# Patient Record
Sex: FEMALE | Race: WHITE | NOT HISPANIC OR LATINO | Employment: OTHER | URBAN - METROPOLITAN AREA
[De-identification: names, ages, dates, MRNs, and addresses within clinical notes are randomized per-mention and may not be internally consistent; named-entity substitution may affect disease eponyms.]

---

## 2023-02-27 LAB — THYROTROPIN (MIU/L) IN SER/PLAS BY DETECTION LIMIT <= 0.05 MIU/L: 1.79 MIU/L (ref 0.44–3.98)

## 2023-03-10 DIAGNOSIS — G25.81 RLS (RESTLESS LEGS SYNDROME): ICD-10-CM

## 2023-03-10 RX ORDER — ALENDRONATE SODIUM 70 MG/1
70 TABLET ORAL
COMMUNITY
Start: 2023-01-02 | End: 2023-05-31 | Stop reason: ALTCHOICE

## 2023-03-10 RX ORDER — LEVOTHYROXINE SODIUM 25 UG/1
1 TABLET ORAL DAILY
COMMUNITY
Start: 2023-03-03 | End: 2023-05-19 | Stop reason: DRUGHIGH

## 2023-03-10 RX ORDER — ALBUTEROL SULFATE 90 UG/1
2 AEROSOL, METERED RESPIRATORY (INHALATION) EVERY 4 HOURS PRN
COMMUNITY
Start: 2014-01-23 | End: 2023-05-31 | Stop reason: ALTCHOICE

## 2023-03-10 RX ORDER — ROPINIROLE 2 MG/1
2 TABLET, FILM COATED ORAL NIGHTLY
COMMUNITY
End: 2023-03-10 | Stop reason: SDUPTHER

## 2023-03-10 RX ORDER — KETOROLAC TROMETHAMINE 10 MG/1
10 TABLET, FILM COATED ORAL EVERY 6 HOURS PRN
COMMUNITY
Start: 2021-02-17 | End: 2023-05-31 | Stop reason: ALTCHOICE

## 2023-03-10 RX ORDER — LEVOTHYROXINE SODIUM 50 UG/1
1 TABLET ORAL
COMMUNITY
Start: 2020-12-09 | End: 2023-05-19 | Stop reason: DRUGHIGH

## 2023-03-10 RX ORDER — FUROSEMIDE 80 MG/1
1 TABLET ORAL 2 TIMES DAILY
COMMUNITY
Start: 2020-06-03 | End: 2023-06-12 | Stop reason: WASHOUT

## 2023-03-10 RX ORDER — DICLOFENAC SODIUM 50 MG/1
1 TABLET, DELAYED RELEASE ORAL 2 TIMES DAILY PRN
COMMUNITY
Start: 2021-07-16 | End: 2023-05-31 | Stop reason: ALTCHOICE

## 2023-03-10 RX ORDER — ZOLPIDEM TARTRATE 5 MG/1
5 TABLET ORAL NIGHTLY PRN
COMMUNITY
Start: 2023-02-25 | End: 2023-03-28 | Stop reason: SDUPTHER

## 2023-03-10 RX ORDER — ESOMEPRAZOLE MAGNESIUM 40 MG/1
1 CAPSULE, DELAYED RELEASE ORAL DAILY
COMMUNITY
Start: 2020-10-20 | End: 2023-05-31 | Stop reason: ALTCHOICE

## 2023-03-10 RX ORDER — ROPINIROLE 2 MG/1
2 TABLET, FILM COATED ORAL NIGHTLY
Qty: 90 TABLET | Refills: 0 | Status: SHIPPED | OUTPATIENT
Start: 2023-03-10 | End: 2023-04-03 | Stop reason: SDUPTHER

## 2023-03-10 RX ORDER — ERGOCALCIFEROL 1.25 MG/1
1 CAPSULE ORAL
COMMUNITY
End: 2023-05-31 | Stop reason: ALTCHOICE

## 2023-03-28 DIAGNOSIS — G47.09 OTHER INSOMNIA: Primary | ICD-10-CM

## 2023-03-29 RX ORDER — ZOLPIDEM TARTRATE 5 MG/1
5 TABLET ORAL NIGHTLY PRN
Qty: 30 TABLET | Refills: 0 | Status: SHIPPED | OUTPATIENT
Start: 2023-03-29 | End: 2023-04-26 | Stop reason: SDUPTHER

## 2023-03-30 DIAGNOSIS — G47.09 OTHER INSOMNIA: ICD-10-CM

## 2023-03-30 RX ORDER — ZOLPIDEM TARTRATE 5 MG/1
5 TABLET ORAL NIGHTLY PRN
Qty: 30 TABLET | Refills: 0 | OUTPATIENT
Start: 2023-03-30

## 2023-04-03 ENCOUNTER — LAB (OUTPATIENT)
Dept: LAB | Facility: LAB | Age: 68
End: 2023-04-03
Payer: MEDICARE

## 2023-04-03 ENCOUNTER — OFFICE VISIT (OUTPATIENT)
Dept: PRIMARY CARE | Facility: CLINIC | Age: 68
End: 2023-04-03
Payer: MEDICARE

## 2023-04-03 VITALS
HEIGHT: 63 IN | DIASTOLIC BLOOD PRESSURE: 88 MMHG | SYSTOLIC BLOOD PRESSURE: 134 MMHG | BODY MASS INDEX: 30.3 KG/M2 | OXYGEN SATURATION: 99 % | HEART RATE: 128 BPM | WEIGHT: 171 LBS

## 2023-04-03 DIAGNOSIS — R10.84 GENERALIZED ABDOMINAL PAIN: ICD-10-CM

## 2023-04-03 DIAGNOSIS — E66.09 CLASS 1 OBESITY DUE TO EXCESS CALORIES WITH SERIOUS COMORBIDITY AND BODY MASS INDEX (BMI) OF 30.0 TO 30.9 IN ADULT: ICD-10-CM

## 2023-04-03 DIAGNOSIS — R53.83 OTHER FATIGUE: ICD-10-CM

## 2023-04-03 DIAGNOSIS — E55.9 VITAMIN D DEFICIENCY: ICD-10-CM

## 2023-04-03 DIAGNOSIS — E03.9 ACQUIRED HYPOTHYROIDISM: ICD-10-CM

## 2023-04-03 DIAGNOSIS — E04.1 THYROID NODULE: ICD-10-CM

## 2023-04-03 DIAGNOSIS — G47.01 INSOMNIA DUE TO MEDICAL CONDITION: ICD-10-CM

## 2023-04-03 DIAGNOSIS — G25.81 RLS (RESTLESS LEGS SYNDROME): Primary | ICD-10-CM

## 2023-04-03 PROBLEM — R92.8 ABNORMAL MAMMOGRAM: Status: ACTIVE | Noted: 2023-04-03

## 2023-04-03 PROBLEM — M72.0 DUPUYTREN'S CONTRACTURE: Status: ACTIVE | Noted: 2023-04-03

## 2023-04-03 PROBLEM — M54.50 LOWER BACK PAIN: Status: ACTIVE | Noted: 2023-04-03

## 2023-04-03 PROBLEM — R93.89 ABNORMAL CT SCAN: Status: ACTIVE | Noted: 2023-04-03

## 2023-04-03 PROBLEM — M51.369 DISC DEGENERATION, LUMBAR: Status: ACTIVE | Noted: 2023-04-03

## 2023-04-03 PROBLEM — E53.8 VITAMIN B12 DEFICIENCY: Status: ACTIVE | Noted: 2023-04-03

## 2023-04-03 PROBLEM — R21 RASH OF FACE: Status: ACTIVE | Noted: 2023-04-03

## 2023-04-03 PROBLEM — I10 HTN (HYPERTENSION): Status: ACTIVE | Noted: 2023-04-03

## 2023-04-03 PROBLEM — R06.02 SOB (SHORTNESS OF BREATH) ON EXERTION: Status: ACTIVE | Noted: 2023-04-03

## 2023-04-03 PROBLEM — R91.8 LUNG NODULES: Status: ACTIVE | Noted: 2023-04-03

## 2023-04-03 PROBLEM — R41.3 MEMORY PROBLEM: Status: ACTIVE | Noted: 2023-04-03

## 2023-04-03 PROBLEM — M25.531 RIGHT WRIST PAIN: Status: ACTIVE | Noted: 2023-04-03

## 2023-04-03 PROBLEM — R05.9 COUGH: Status: ACTIVE | Noted: 2023-04-03

## 2023-04-03 PROBLEM — G93.32 CHRONIC FATIGUE SYNDROME: Status: ACTIVE | Noted: 2023-04-03

## 2023-04-03 PROBLEM — M25.551 RIGHT HIP PAIN: Status: ACTIVE | Noted: 2023-04-03

## 2023-04-03 PROBLEM — K21.9 ESOPHAGEAL REFLUX: Status: ACTIVE | Noted: 2023-04-03

## 2023-04-03 PROBLEM — R06.89 DIFFICULTY BREATHING: Status: ACTIVE | Noted: 2023-04-03

## 2023-04-03 PROBLEM — M54.16 LUMBAR RADICULOPATHY: Status: ACTIVE | Noted: 2023-04-03

## 2023-04-03 PROBLEM — J03.90 TONSILLITIS: Status: ACTIVE | Noted: 2023-04-03

## 2023-04-03 PROBLEM — F41.9 ANXIETY: Status: ACTIVE | Noted: 2023-04-03

## 2023-04-03 PROBLEM — E78.01 ESSENTIAL FAMILIAL HYPERCHOLESTEROLEMIA: Status: ACTIVE | Noted: 2023-04-03

## 2023-04-03 PROBLEM — G89.4 CHRONIC PAIN SYNDROME: Status: ACTIVE | Noted: 2023-04-03

## 2023-04-03 PROBLEM — J30.9 ALLERGIC RHINITIS: Status: ACTIVE | Noted: 2023-04-03

## 2023-04-03 PROBLEM — R13.12 OROPHARYNGEAL DYSPHAGIA: Status: ACTIVE | Noted: 2023-04-03

## 2023-04-03 PROBLEM — E66.9 OBESITY: Status: ACTIVE | Noted: 2023-04-03

## 2023-04-03 PROBLEM — R53.82 CHRONIC FATIGUE: Status: ACTIVE | Noted: 2023-04-03

## 2023-04-03 PROBLEM — E04.9 ENLARGED THYROID: Status: ACTIVE | Noted: 2023-04-03

## 2023-04-03 PROBLEM — M51.36 DISC DEGENERATION, LUMBAR: Status: ACTIVE | Noted: 2023-04-03

## 2023-04-03 PROBLEM — E78.5 DYSLIPIDEMIA: Status: ACTIVE | Noted: 2023-04-03

## 2023-04-03 PROBLEM — R93.89 ABNORMAL ULTRASOUND OF THYROID GLAND: Status: ACTIVE | Noted: 2023-04-03

## 2023-04-03 PROBLEM — E66.811 CLASS 1 OBESITY DUE TO EXCESS CALORIES WITH SERIOUS COMORBIDITY AND BODY MASS INDEX (BMI) OF 32.0 TO 32.9 IN ADULT: Status: ACTIVE | Noted: 2023-04-03

## 2023-04-03 PROBLEM — R05.3 CHRONIC COUGH: Status: ACTIVE | Noted: 2023-04-03

## 2023-04-03 PROBLEM — R22.1 MASS OF PARAPHARYNGEAL SPACE: Status: ACTIVE | Noted: 2023-04-03

## 2023-04-03 PROBLEM — M96.1 POSTLAMINECTOMY SYNDROME: Status: ACTIVE | Noted: 2023-04-03

## 2023-04-03 PROBLEM — R39.198 DIFFICULTY URINATING: Status: ACTIVE | Noted: 2023-04-03

## 2023-04-03 PROBLEM — M25.511 RIGHT SHOULDER PAIN: Status: ACTIVE | Noted: 2023-04-03

## 2023-04-03 PROBLEM — G47.00 INSOMNIA: Status: ACTIVE | Noted: 2023-04-03

## 2023-04-03 LAB
ALANINE AMINOTRANSFERASE (SGPT) (U/L) IN SER/PLAS: 16 U/L (ref 7–45)
ALBUMIN (G/DL) IN SER/PLAS: 4.9 G/DL (ref 3.4–5)
ALKALINE PHOSPHATASE (U/L) IN SER/PLAS: 81 U/L (ref 33–136)
ANION GAP IN SER/PLAS: 15 MMOL/L (ref 10–20)
APPEARANCE, URINE: NORMAL
ASPARTATE AMINOTRANSFERASE (SGOT) (U/L) IN SER/PLAS: 18 U/L (ref 9–39)
BASOPHILS (10*3/UL) IN BLOOD BY AUTOMATED COUNT: 0.08 X10E9/L (ref 0–0.1)
BASOPHILS/100 LEUKOCYTES IN BLOOD BY AUTOMATED COUNT: 0.6 % (ref 0–2)
BILIRUBIN TOTAL (MG/DL) IN SER/PLAS: 0.4 MG/DL (ref 0–1.2)
BILIRUBIN, URINE: NEGATIVE
BLOOD, URINE: NEGATIVE
CALCIUM (MG/DL) IN SER/PLAS: 10.5 MG/DL (ref 8.6–10.3)
CARBON DIOXIDE, TOTAL (MMOL/L) IN SER/PLAS: 29 MMOL/L (ref 21–32)
CHLORIDE (MMOL/L) IN SER/PLAS: 98 MMOL/L (ref 98–107)
COLOR, URINE: YELLOW
CREATININE (MG/DL) IN SER/PLAS: 0.8 MG/DL (ref 0.5–1.05)
EOSINOPHILS (10*3/UL) IN BLOOD BY AUTOMATED COUNT: 0.28 X10E9/L (ref 0–0.7)
EOSINOPHILS/100 LEUKOCYTES IN BLOOD BY AUTOMATED COUNT: 2.3 % (ref 0–6)
ERYTHROCYTE DISTRIBUTION WIDTH (RATIO) BY AUTOMATED COUNT: 13.8 % (ref 11.5–14.5)
ERYTHROCYTE MEAN CORPUSCULAR HEMOGLOBIN CONCENTRATION (G/DL) BY AUTOMATED: 32.9 G/DL (ref 32–36)
ERYTHROCYTE MEAN CORPUSCULAR VOLUME (FL) BY AUTOMATED COUNT: 87 FL (ref 80–100)
ERYTHROCYTES (10*6/UL) IN BLOOD BY AUTOMATED COUNT: 5.63 X10E12/L (ref 4–5.2)
GFR FEMALE: 80 ML/MIN/1.73M2
GLUCOSE (MG/DL) IN SER/PLAS: 87 MG/DL (ref 74–99)
GLUCOSE, URINE: NEGATIVE MG/DL
HEMATOCRIT (%) IN BLOOD BY AUTOMATED COUNT: 48.9 % (ref 36–46)
HEMOGLOBIN (G/DL) IN BLOOD: 16.1 G/DL (ref 12–16)
IMMATURE GRANULOCYTES/100 LEUKOCYTES IN BLOOD BY AUTOMATED COUNT: 0.5 % (ref 0–0.9)
KETONES, URINE: NEGATIVE MG/DL
LEUKOCYTE ESTERASE, URINE: NEGATIVE
LEUKOCYTES (10*3/UL) IN BLOOD BY AUTOMATED COUNT: 12.4 X10E9/L (ref 4.4–11.3)
LYMPHOCYTES (10*3/UL) IN BLOOD BY AUTOMATED COUNT: 4.03 X10E9/L (ref 1.2–4.8)
LYMPHOCYTES/100 LEUKOCYTES IN BLOOD BY AUTOMATED COUNT: 32.6 % (ref 13–44)
MONOCYTES (10*3/UL) IN BLOOD BY AUTOMATED COUNT: 0.86 X10E9/L (ref 0.1–1)
MONOCYTES/100 LEUKOCYTES IN BLOOD BY AUTOMATED COUNT: 7 % (ref 2–10)
NEUTROPHILS (10*3/UL) IN BLOOD BY AUTOMATED COUNT: 7.05 X10E9/L (ref 1.2–7.7)
NEUTROPHILS/100 LEUKOCYTES IN BLOOD BY AUTOMATED COUNT: 57 % (ref 40–80)
NITRITE, URINE: NEGATIVE
PH, URINE: 6 (ref 5–8)
PLATELETS (10*3/UL) IN BLOOD AUTOMATED COUNT: 289 X10E9/L (ref 150–450)
POTASSIUM (MMOL/L) IN SER/PLAS: 3.7 MMOL/L (ref 3.5–5.3)
PROTEIN TOTAL: 8.1 G/DL (ref 6.4–8.2)
PROTEIN, URINE: NEGATIVE MG/DL
SODIUM (MMOL/L) IN SER/PLAS: 138 MMOL/L (ref 136–145)
SPECIFIC GRAVITY, URINE: 1.02 (ref 1–1.03)
THYROTROPIN (MIU/L) IN SER/PLAS BY DETECTION LIMIT <= 0.05 MIU/L: 2.86 MIU/L (ref 0.44–3.98)
UREA NITROGEN (MG/DL) IN SER/PLAS: 22 MG/DL (ref 6–23)
UROBILINOGEN, URINE: <2 MG/DL (ref 0–1.9)

## 2023-04-03 PROCEDURE — 1036F TOBACCO NON-USER: CPT | Performed by: FAMILY MEDICINE

## 2023-04-03 PROCEDURE — 85025 COMPLETE CBC W/AUTO DIFF WBC: CPT

## 2023-04-03 PROCEDURE — 99214 OFFICE O/P EST MOD 30 MIN: CPT | Performed by: FAMILY MEDICINE

## 2023-04-03 PROCEDURE — 80053 COMPREHEN METABOLIC PANEL: CPT

## 2023-04-03 PROCEDURE — 3008F BODY MASS INDEX DOCD: CPT | Performed by: FAMILY MEDICINE

## 2023-04-03 PROCEDURE — 1159F MED LIST DOCD IN RCRD: CPT | Performed by: FAMILY MEDICINE

## 2023-04-03 PROCEDURE — 3079F DIAST BP 80-89 MM HG: CPT | Performed by: FAMILY MEDICINE

## 2023-04-03 PROCEDURE — 82306 VITAMIN D 25 HYDROXY: CPT

## 2023-04-03 PROCEDURE — 1160F RVW MEDS BY RX/DR IN RCRD: CPT | Performed by: FAMILY MEDICINE

## 2023-04-03 PROCEDURE — 82607 VITAMIN B-12: CPT

## 2023-04-03 PROCEDURE — 3075F SYST BP GE 130 - 139MM HG: CPT | Performed by: FAMILY MEDICINE

## 2023-04-03 PROCEDURE — 36415 COLL VENOUS BLD VENIPUNCTURE: CPT

## 2023-04-03 PROCEDURE — 84443 ASSAY THYROID STIM HORMONE: CPT

## 2023-04-03 PROCEDURE — 81003 URINALYSIS AUTO W/O SCOPE: CPT

## 2023-04-03 RX ORDER — ROPINIROLE 2 MG/1
2 TABLET, FILM COATED ORAL 2 TIMES DAILY
Qty: 180 TABLET | Refills: 0 | Status: SHIPPED | OUTPATIENT
Start: 2023-04-03 | End: 2023-06-27 | Stop reason: SDUPTHER

## 2023-04-03 ASSESSMENT — PAIN SCALES - GENERAL: PAINLEVEL: 0-NO PAIN

## 2023-04-03 NOTE — PROGRESS NOTES
"Subjective     Chantelle Rao is a 67 y.o. female who presents for No chief complaint on file..       HPI  The patient is a 67 year-old female presenting to the clinic for a medication refill.   Follow-up.  Stated that her pulse was elevated and has appointment coming up with cardiologist.  Had been to endocrinologist.  Educated on restless leg syndrome.  Educated on obesity and diet and exercise advised lose weight.  Educated on insomnia and sleep hygiene.  Complaining pain in the abdomen on and off.  Pain comes and goes.  Denies nausea vomiting and diarrhea.  Complain feeling tired but advised on diet exercise.  Educated on vitamin D deficiency we will continue monitor.  Admitted endocrinology for thyroid nodule.  Educated on hypothyroidism.  Dictated as above.    Review of Systems  Review of systems    General.  Denies fever.  Denies chills.    HEENT denies nasal congestion.  Denies sinus pressure.    Respiratory.  Denies cough.  Denies shortness of breath.    Cardiovascular.  Denies chest pain.  Denies heart palpitations.  Denies shortness of breath.    Gastrointestinal.  Dictated as above  Genitourinary denies burning urination.  Denies frequent urination.  Denies flank pain.  Denies blood in the urine.  Denies abnormal vaginal discharge.    Neurology.  Denies tingling numbness but denies weakness.  Denies headache.  Denies blurred vision.    Musculoskeletal.  Denies body aches.  Denies joint pains.  Denies muscle aches.  Denies muscle weakness    Endocrinology.  Denies cold intolerance.  Denies hot intolerance.    Psychiatric.  Denies depression.  Denies anxiety.  Denies suicidal.  Denies homicidal.      Objective       4/3/2023 2:10 PM   /88   BP Location Left arm   Patient Position Sitting   BP Cuff Size Large adult   Pulse 128   SpO2 99 %   Weight 77.6 kg (171 lb)   Height 1.6 m (5' 3\")   Pain Score 0-No pain    Other Vitals   BMI 30.29 kg/m2   BSA 1.86 m2   Menstrual status Postmenopausal   OB/Gyn " status reviewed 4/3/2023   Tobacco   Smoking status Never   Counseling given? No   Smokeless status Never   Reviewed 4/3/2023           Physical Exam  General.  Not in distress.  HEENT normocephalic anicteric sclerae.  Neck soft supple no thyromegaly.  No carotid bruit.  Lungs are clear.  Heart regular.  Abdomen soft slight tenderness noted in all quadrants of the abdomen nondistended bowel sounds are positive.  Extremities no clubbing cyanosis or edema.  Psychiatric.  Has good eye contact.  No crying spells noted.  Speech was normal.  Denies depression.  Denies suicidal.  Denies homicidal.      Assessment/Plan     1.  Restless leg syndrome.  Educated on restless leg syndrome.  Explained adverse effects of medication.    2.        Obesity.  Dictated as above.    3.  Insomnia.  Dictated as above.    4.  Generalized abdominal pain.  We will follow-up on the CT scan.  In the meantime advised patient to call 911 or to go to the emergency room as soon as possible if develops abdominal pain, pain is getting worse, fever, chills, nausea or vomiting.    5.  Fatigue.  Dictated as above.    6.  Vitamin D deficiency.  Educated on vitamin D deficiency.  We will continue monitor.    7.  Thyroid nodule/hypothyroidism.  Dictated as above              Problem List Items Addressed This Visit    None  Scribe Attestation  By signing my name below, IRosaura Scribe   attest that this documentation has been prepared under the direction and in the presence of Yang Fernandez MD.

## 2023-04-04 LAB
CALCIDIOL (25 OH VITAMIN D3) (NG/ML) IN SER/PLAS: 76 NG/ML
COBALAMIN (VITAMIN B12) (PG/ML) IN SER/PLAS: >2000 PG/ML (ref 211–911)

## 2023-04-10 RX ORDER — ERGOCALCIFEROL 1.25 MG/1
1 CAPSULE ORAL
Qty: 12 CAPSULE | Refills: 0 | OUTPATIENT
Start: 2023-04-10

## 2023-04-19 NOTE — PROGRESS NOTES
Subjective     Chantelle Rao is a 67 y.o. female who presents for Follow-up (Follow up meds/results).      HPI  The patient is a 67 year-old female presenting to the clinic for follow up on lab results. I have reviewed results from her most recent blood work with her.  Patient refused all vaccinations.  Patient sees cardiology.  Patient sees Endocrinology for Thyroid.  Order placed for Mammogram 4/20/2023.  Referral placed with Pulmonology 4/20/2023.  Discussed elevated white blood count.  Referral placed with He  Component      Latest Ref Rng 4/3/2023   WBC      4.4 - 11.3 x10E9/L 12.4 (H)    RBC      4.00 - 5.20 x10E12/L 5.63 (H)    HEMOGLOBIN      12.0 - 16.0 g/dL 16.1 (H)    HEMATOCRIT      36.0 - 46.0 % 48.9 (H)    MCV      80 - 100 fL 87    MCHC      32.0 - 36.0 g/dL 32.9    Platelets      150 - 450 x10E9/L 289    RED CELL DISTRIBUTION WIDTH      11.5 - 14.5 % 13.8    Neutrophils %      40.0 - 80.0 % 57.0    Immature Granulocytes %, Automated      0.0 - 0.9 % 0.5    Lymphocytes %      13.0 - 44.0 % 32.6    Monocytes %      2.0 - 10.0 % 7.0    Eosinophils %      0.0 - 6.0 % 2.3    Basophils %      0.0 - 2.0 % 0.6    Neutrophils Absolute      1.20 - 7.70 x10E9/L 7.05    Lymphocytes Absolute      1.20 - 4.80 x10E9/L 4.03    Monocytes Absolute      0.10 - 1.00 x10E9/L 0.86    Eosinophils Absolute      0.00 - 0.70 x10E9/L 0.28    Basophils Absolute      0.00 - 0.10 x10E9/L 0.08    GLUCOSE      74 - 99 mg/dL 87    SODIUM      136 - 145 mmol/L 138    POTASSIUM      3.5 - 5.3 mmol/L 3.7    CHLORIDE      98 - 107 mmol/L 98    Bicarbonate      21 - 32 mmol/L 29    Anion Gap      10 - 20 mmol/L 15    Blood Urea Nitrogen      6 - 23 mg/dL 22    Creatinine      0.50 - 1.05 mg/dL 0.80    GFR Female      >90 mL/min/1.73m2 80    Calcium      8.6 - 10.3 mg/dL 10.5 (H)    Albumin      3.4 - 5.0 g/dL 4.9    Alkaline Phosphatase      33 - 136 U/L 81    Total Protein      6.4 - 8.2 g/dL 8.1    AST      9 - 39 U/L 18    Bilirubin  Total      0.0 - 1.2 mg/dL 0.4    ALT      7 - 45 U/L 16    Color, Urine      STRAW,YELLOW  YELLOW    Appearance, Urine      CLEAR  HAZY    Specific Gravity, Urine      1.005 - 1.035  1.019    pH, Urine      5.0 - 8.0  6.0    Protein, Urine      NEGATIVE mg/dL NEGATIVE    Glucose, Urine      NEGATIVE mg/dL NEGATIVE    Blood, Urine      NEGATIVE  NEGATIVE    Ketones, Urine      NEGATIVE mg/dL NEGATIVE    Bilirubin, Urine      NEGATIVE  NEGATIVE    Urobilinogen, Urine      0.0 - 1.9 mg/dL <2.0    Nitrite, Urine      NEGATIVE  NEGATIVE    Leukocyte Esterase, Urine      NEGATIVE  NEGATIVE    Thyroid Stimulating Hormone      0.44 - 3.98 mIU/L 2.86    Vitamin B12      211 - 911 pg/mL >2000 !    Vitamin D, 25-Hydroxy, Total      ng/mL 76       Legend:  (H) High  ! Abnormalmatology 4/20/2023.     Follow-up.  Reviewed lab results.  Educated on vitamin D deficiency.  Had been to cardiologist.  Had been to endocrinologist.  History of COPD.    Educated on hypothyroidism.  Had been to cardiologist.  Still having trouble breathing.  Recommend and defer to the pulmonology.  Educated on atherosclerosis of her aorta.  Advised to keep appointment with the specialist.  Educated on obesity and diet and exercise.  Advised to lose weight.  Educated on elevated WBC.  Keep appoint with a specialist.  Discussed blood work for screening for condition.  Educated on insomnia and sleep hygiene.  Educated on dyslipidemia and diet and exercise.  Complaining feeling tired but advised on diet exercise.  Has been to endocrinology for ultrasound of thyroid gland.      Review of Systems  Review of systems    General.  Denies fever.  Denies chills.    HEENT denies nasal congestion.  Denies sinus pressure.  .  Respiratory.  Dictated as above..       Cardiovascular.  Denies chest pain.  Denies heart palpitations.  Denies shortness of breath.    Gastrointestinal.  Denies nausea vomiting diarrhea.  Denies abdominal pain.    Genitourinary denies burning  "urination.  Denies frequent urination.  Denies flank pain.  Denies blood in the urine.  Denies abnormal vaginal discharge.    Neurology.  Denies tingling numbness but denies weakness.  Denies headache.  Denies blurred vision.    Musculoskeletal.  Denies body aches.  Denies joint pains.  Denies muscle aches.  Denies muscle weakness    Endocrinology.  Denies cold intolerance.  Denies hot intolerance.    Psychiatric.  Denies depression.  Denies anxiety.  Denies suicidal.  Denies homicidal.    Objective   /79 (BP Location: Left arm, Patient Position: Sitting, BP Cuff Size: Large adult)   Pulse 86   Ht 1.6 m (5' 3\")   Wt 77.6 kg (171 lb)   SpO2 98%   BMI 30.29 kg/m²        Physical Exam  General.  Not in distress.  HEENT normocephalic anicteric sclerae.  Neck soft supple no thyromegaly.  No carotid bruit.  Lungs are clear.  Heart regular.  Abdomen soft nontender nondistended bowel sounds are positive.  Extremities no clubbing cyanosis or edema.  Psychiatric.  Has good eye contact.  No crying spells noted.  Speech was normal.  Denies depression.  Denies suicidal.  Denies homicidal.    Assessment/Plan       1.  Vitamin D deficiency.  Educated on vitamin D deficiency we will continue monitor.    2.  Hypothyroidism.  Educated on hypothyroidism we will continue monitor.    3.  Chronic bronchitis.  Dictated as above  4.  Atherosclerosis of aorta.  Dictated as above.    5.  Obesity.  Dictated as above.    6.  Shortness of breath on exertion.  Dictated as above.    7.  Elevated WBC.  Dictated as above.    8.  Screening for condition.  Dictated as above.    9.  Insomnia.  Educated on sleep hygiene.  Continue current management.    10.  Dyslipidemia.  Dictated as above.    11.  Fatigue.  Dictated as above                                                      Problem List Items Addressed This Visit          Nervous    Insomnia       Respiratory    SOB (shortness of breath) on exertion    Relevant Orders    Referral to " Pulmonology    Simple chronic bronchitis (CMS/HCC)       Circulatory    Atherosclerosis of aorta (CMS/HCC)       Endocrine/Metabolic    Vitamin D deficiency - Primary    Class 1 obesity due to excess calories with serious comorbidity and body mass index (BMI) of 30.0 to 30.9 in adult    Hypothyroidism       Other    Chronic fatigue    Dyslipidemia    Abnormal ultrasound of thyroid gland     Other Visit Diagnoses       Encounter for screening mammogram for malignant neoplasm of breast        Relevant Orders    BI mammo bilateral screening tomosynthesis    Other elevated white blood cell (WBC) count        Relevant Orders    Referral to Hematology    Screening for condition        Relevant Orders    Hepatitis C antibody        Scribe Attestation  By signing my name below, IRosaura Scribe   attest that this documentation has been prepared under the direction and in the presence of Yang Fernandez MD.

## 2023-04-20 ENCOUNTER — OFFICE VISIT (OUTPATIENT)
Dept: PRIMARY CARE | Facility: CLINIC | Age: 68
End: 2023-04-20
Payer: MEDICARE

## 2023-04-20 VITALS
HEIGHT: 63 IN | HEART RATE: 86 BPM | OXYGEN SATURATION: 98 % | DIASTOLIC BLOOD PRESSURE: 79 MMHG | BODY MASS INDEX: 30.3 KG/M2 | WEIGHT: 171 LBS | SYSTOLIC BLOOD PRESSURE: 131 MMHG

## 2023-04-20 DIAGNOSIS — E55.9 VITAMIN D DEFICIENCY: Primary | ICD-10-CM

## 2023-04-20 DIAGNOSIS — D72.828 OTHER ELEVATED WHITE BLOOD CELL (WBC) COUNT: ICD-10-CM

## 2023-04-20 DIAGNOSIS — J41.0 SIMPLE CHRONIC BRONCHITIS (MULTI): ICD-10-CM

## 2023-04-20 DIAGNOSIS — F51.04 PSYCHOPHYSIOLOGICAL INSOMNIA: ICD-10-CM

## 2023-04-20 DIAGNOSIS — E78.5 DYSLIPIDEMIA: ICD-10-CM

## 2023-04-20 DIAGNOSIS — E03.9 ACQUIRED HYPOTHYROIDISM: ICD-10-CM

## 2023-04-20 DIAGNOSIS — R53.82 CHRONIC FATIGUE: ICD-10-CM

## 2023-04-20 DIAGNOSIS — E66.09 CLASS 1 OBESITY DUE TO EXCESS CALORIES WITH SERIOUS COMORBIDITY AND BODY MASS INDEX (BMI) OF 30.0 TO 30.9 IN ADULT: ICD-10-CM

## 2023-04-20 DIAGNOSIS — Z13.9 SCREENING FOR CONDITION: ICD-10-CM

## 2023-04-20 DIAGNOSIS — Z12.31 ENCOUNTER FOR SCREENING MAMMOGRAM FOR MALIGNANT NEOPLASM OF BREAST: ICD-10-CM

## 2023-04-20 DIAGNOSIS — I70.0 ATHEROSCLEROSIS OF AORTA (CMS-HCC): ICD-10-CM

## 2023-04-20 DIAGNOSIS — R93.89 ABNORMAL ULTRASOUND OF THYROID GLAND: ICD-10-CM

## 2023-04-20 DIAGNOSIS — R06.02 SOB (SHORTNESS OF BREATH) ON EXERTION: ICD-10-CM

## 2023-04-20 PROCEDURE — 1036F TOBACCO NON-USER: CPT | Performed by: FAMILY MEDICINE

## 2023-04-20 PROCEDURE — 99214 OFFICE O/P EST MOD 30 MIN: CPT | Performed by: FAMILY MEDICINE

## 2023-04-20 PROCEDURE — 3075F SYST BP GE 130 - 139MM HG: CPT | Performed by: FAMILY MEDICINE

## 2023-04-20 PROCEDURE — 3008F BODY MASS INDEX DOCD: CPT | Performed by: FAMILY MEDICINE

## 2023-04-20 PROCEDURE — 3078F DIAST BP <80 MM HG: CPT | Performed by: FAMILY MEDICINE

## 2023-04-20 PROCEDURE — 1160F RVW MEDS BY RX/DR IN RCRD: CPT | Performed by: FAMILY MEDICINE

## 2023-04-20 PROCEDURE — 1159F MED LIST DOCD IN RCRD: CPT | Performed by: FAMILY MEDICINE

## 2023-04-20 ASSESSMENT — ENCOUNTER SYMPTOMS
OCCASIONAL FEELINGS OF UNSTEADINESS: 0
DEPRESSION: 0
LOSS OF SENSATION IN FEET: 0

## 2023-04-24 DIAGNOSIS — G47.09 OTHER INSOMNIA: ICD-10-CM

## 2023-04-24 RX ORDER — ZOLPIDEM TARTRATE 5 MG/1
5 TABLET ORAL NIGHTLY PRN
Qty: 30 TABLET | Refills: 0 | OUTPATIENT
Start: 2023-04-24

## 2023-04-25 ENCOUNTER — LAB (OUTPATIENT)
Dept: LAB | Facility: LAB | Age: 68
End: 2023-04-25
Payer: MEDICARE

## 2023-04-25 DIAGNOSIS — Z13.9 SCREENING FOR CONDITION: ICD-10-CM

## 2023-04-25 PROCEDURE — 36415 COLL VENOUS BLD VENIPUNCTURE: CPT

## 2023-04-25 PROCEDURE — 86803 HEPATITIS C AB TEST: CPT

## 2023-04-26 ENCOUNTER — TELEPHONE (OUTPATIENT)
Dept: PRIMARY CARE | Facility: CLINIC | Age: 68
End: 2023-04-26
Payer: MEDICARE

## 2023-04-26 DIAGNOSIS — G47.09 OTHER INSOMNIA: ICD-10-CM

## 2023-04-26 LAB — HEPATITIS C VIRUS AB PRESENCE IN SERUM: NONREACTIVE

## 2023-04-26 RX ORDER — ZOLPIDEM TARTRATE 5 MG/1
5 TABLET ORAL NIGHTLY PRN
Qty: 30 TABLET | Refills: 0 | Status: SHIPPED | OUTPATIENT
Start: 2023-04-28 | End: 2023-05-30 | Stop reason: SDUPTHER

## 2023-04-26 NOTE — TELEPHONE ENCOUNTER
----- Message from Patricia Barba LPN sent at 4/26/2023  9:07 AM EDT -----  Regarding: FW: Refill on medication  Contact: [phone]    ----- Message -----  From: Chantelle Rao  Sent: 4/26/2023   8:52 AM EDT  To:  [place] [id number] Clinical Support Staff  Subject: Refill on medication                             Zolpidem 5 mg

## 2023-04-28 ENCOUNTER — APPOINTMENT (OUTPATIENT)
Dept: PRIMARY CARE | Facility: CLINIC | Age: 68
End: 2023-04-28
Payer: MEDICARE

## 2023-05-19 DIAGNOSIS — E03.8 OTHER SPECIFIED HYPOTHYROIDISM: ICD-10-CM

## 2023-05-19 RX ORDER — LEVOTHYROXINE SODIUM 50 UG/1
50 TABLET ORAL
Qty: 90 TABLET | Refills: 0 | Status: SHIPPED | OUTPATIENT
Start: 2023-05-19 | End: 2023-06-20 | Stop reason: SDUPTHER

## 2023-05-30 DIAGNOSIS — G47.09 OTHER INSOMNIA: ICD-10-CM

## 2023-05-30 RX ORDER — ZOLPIDEM TARTRATE 5 MG/1
5 TABLET ORAL NIGHTLY PRN
Qty: 30 TABLET | Refills: 0 | Status: SHIPPED | OUTPATIENT
Start: 2023-06-01 | End: 2023-07-19 | Stop reason: SDUPTHER

## 2023-05-31 ENCOUNTER — OFFICE VISIT (OUTPATIENT)
Dept: PRIMARY CARE | Facility: CLINIC | Age: 68
End: 2023-05-31
Payer: MEDICARE

## 2023-05-31 VITALS
HEIGHT: 63 IN | BODY MASS INDEX: 30.3 KG/M2 | HEART RATE: 114 BPM | DIASTOLIC BLOOD PRESSURE: 84 MMHG | SYSTOLIC BLOOD PRESSURE: 146 MMHG | WEIGHT: 171 LBS | OXYGEN SATURATION: 98 %

## 2023-05-31 DIAGNOSIS — M25.551 RIGHT HIP PAIN: ICD-10-CM

## 2023-05-31 DIAGNOSIS — I35.1 AORTIC VALVE INSUFFICIENCY, ETIOLOGY OF CARDIAC VALVE DISEASE UNSPECIFIED: ICD-10-CM

## 2023-05-31 DIAGNOSIS — R79.89 ABNORMAL CBC: ICD-10-CM

## 2023-05-31 DIAGNOSIS — M54.41 ACUTE BILATERAL LOW BACK PAIN WITH RIGHT-SIDED SCIATICA: ICD-10-CM

## 2023-05-31 DIAGNOSIS — G47.01 INSOMNIA DUE TO MEDICAL CONDITION: ICD-10-CM

## 2023-05-31 DIAGNOSIS — E66.09 CLASS 1 OBESITY DUE TO EXCESS CALORIES WITH SERIOUS COMORBIDITY AND BODY MASS INDEX (BMI) OF 30.0 TO 30.9 IN ADULT: ICD-10-CM

## 2023-05-31 DIAGNOSIS — E55.9 VITAMIN D DEFICIENCY: ICD-10-CM

## 2023-05-31 DIAGNOSIS — F41.9 ANXIETY: ICD-10-CM

## 2023-05-31 DIAGNOSIS — R93.1 ABNORMAL ECHOCARDIOGRAM: Primary | ICD-10-CM

## 2023-05-31 PROCEDURE — 3008F BODY MASS INDEX DOCD: CPT | Performed by: FAMILY MEDICINE

## 2023-05-31 PROCEDURE — 1159F MED LIST DOCD IN RCRD: CPT | Performed by: FAMILY MEDICINE

## 2023-05-31 PROCEDURE — 1160F RVW MEDS BY RX/DR IN RCRD: CPT | Performed by: FAMILY MEDICINE

## 2023-05-31 PROCEDURE — 3077F SYST BP >= 140 MM HG: CPT | Performed by: FAMILY MEDICINE

## 2023-05-31 PROCEDURE — 3079F DIAST BP 80-89 MM HG: CPT | Performed by: FAMILY MEDICINE

## 2023-05-31 PROCEDURE — 99214 OFFICE O/P EST MOD 30 MIN: CPT | Performed by: FAMILY MEDICINE

## 2023-05-31 PROCEDURE — 1036F TOBACCO NON-USER: CPT | Performed by: FAMILY MEDICINE

## 2023-05-31 ASSESSMENT — PAIN SCALES - GENERAL: PAINLEVEL: 4

## 2023-05-31 NOTE — PROGRESS NOTES
Subjective     Chantelle Rao is a 68 y.o. female who presents for Follow-up (Follow up meds).      HPI  The patient is a 68 year-old female presenting to the clinic for follow up on lower back pain. She had a canceled appointment with cardiology.  Referral placed with cardiology 5/31/2023.  Follow-up.  Reviewed echocardiogram results.  Came back abnormal.  Patient had been to the cardiologist in the past.  Would like to go see the cardiologist locally.  Discussed about aortic valve regurgitation.  Referral was placed.  Educated on obesity and diet and exercise.  Complaint pain in the lower back.  Tingling numbness going down the leg all the way down to the right foot.  Denies trauma.  Denies injury.  Denies weakness numbness incontinence.  On pain scale 7-8/10.  Squeezing throbbing pain.  Aggravating factors.  Physical activity walking and relieving factors.  Rest.  Complaining right hip pain.  On pain scale 8-9/10.  Throbbing pain.  History of anxiety but denies depression.  Denies suicidal.  Denies homicidal.  Educated on insomnia and sleep hygiene.        Review of Systems  Review of systems:    General:  Denies fever.  Denies chills.    HEENT: Denies nasal congestion.  Denies sinus pressure.    Respiratory:  Denies cough.  Denies shortness of breath.    Cardiovascular:  Dictated as above.    Gastrointestinal:  Denies nausea vomiting diarrhea.  Denies abdominal pain.    Genitourinary: Denies burning urination.  Denies frequent urination.  Denies flank pain.  Denies blood in the urine.  Denies abnormal vaginal discharge.    Neurology:  Denies tingling numbness but denies weakness.  Denies headache.  Denies blurred vision.    Musculoskeletal: Dictated as above    Endocrinology:  Denies cold intolerance.  Denies hot intolerance.    Psychiatric:  Denies depression.  Denies anxiety.  Denies suicidal.  Denies homicidal.  Neurology.  Dictated as above    Objective   /84 (BP Location: Left arm, Patient Position:  "Sitting, BP Cuff Size: Large adult)   Pulse (!) 114   Ht 1.6 m (5' 3\")   Wt 77.6 kg (171 lb)   SpO2 98%   BMI 30.29 kg/m²        Physical Exam  General.  Not in distress.  HEENT normocephalic anicteric sclerae.  Neck soft supple no thyromegaly.  No carotid bruit.  Lungs are clear.  Heart regular.  Abdomen soft nontender nondistended bowel sounds are positive.  Extremities no clubbing cyanosis or edema.  Psychiatric.  Has good eye contact.  No crying spells noted.  Speech was normal.  Denies depression.  Denies suicidal.  Denies homicidal.  Musculoskeletal tenderness along the lower lumbar paraspinal muscular area.  Tenderness noted on the right hip.  Neurological exam..  Complaining pain in both hips at 45 degree.    Assessment/Plan   1.  Abnormal echocardiogram.  Reviewed echocardiogram results.  Keep appointment with a cardiologist.    2.  Aortic valve insufficiency.  Discussed echocardiogram results.  Keep appointment with a cardiologist.  In the meantime advised patient to call 911 if develops chest pain and/or heart palpitations and/or shortness of breath.  She understood low-grade understanding and agreed.    3.  Obesity.  Educated on and exercise and advised to lose weight.  We will continue monitor      4.  Lower back pain.  Educated on back exercises we will follow-up on the MRI.    5.  Right hip pain.  Educated on hip exercises.  We will follow-up on the MRI.    6.  Anxiety.  Counseled for anxiety and stress.  Denies depression.  Denies suicidal.  Denies homicidal.    7.  Insomnia.  Educated on insomnia and sleep hygiene.  Continue current management    8.  Vitamin D deficiency.  Educated on vitamin D deficiency blood work unremarkable    9.  Abnormal CBC.  Advised to keep appointment with the hematologist.                        Problem List Items Addressed This Visit          Nervous    Insomnia       Musculoskeletal    Right hip pain    Relevant Orders    MR hips right wo IV contrast       " Endocrine/Metabolic    Vitamin D deficiency    Class 1 obesity due to excess calories with serious comorbidity and body mass index (BMI) of 30.0 to 30.9 in adult       Other    Anxiety    Lower back pain    Relevant Orders    MR lumbar spine wo IV contrast     Other Visit Diagnoses       Abnormal echocardiogram    -  Primary    Relevant Orders    Referral to Cardiology    Aortic valve insufficiency, etiology of cardiac valve disease unspecified        Relevant Orders    Referral to Cardiology    Abnormal CBC              Scribe Attestation  By signing my name below, IRosaura Scribe   attest that this documentation has been prepared under the direction and in the presence of Yang Fernandez MD.

## 2023-06-08 ENCOUNTER — APPOINTMENT (OUTPATIENT)
Dept: LAB | Facility: LAB | Age: 68
End: 2023-06-08
Payer: MEDICARE

## 2023-06-08 LAB
ANION GAP IN SER/PLAS: 13 MMOL/L (ref 10–20)
CALCIUM (MG/DL) IN SER/PLAS: 9.2 MG/DL (ref 8.6–10.3)
CARBON DIOXIDE, TOTAL (MMOL/L) IN SER/PLAS: 25 MMOL/L (ref 21–32)
CHLORIDE (MMOL/L) IN SER/PLAS: 106 MMOL/L (ref 98–107)
CREATININE (MG/DL) IN SER/PLAS: 0.74 MG/DL (ref 0.5–1.05)
ERYTHROCYTE DISTRIBUTION WIDTH (RATIO) BY AUTOMATED COUNT: 13.7 % (ref 11.5–14.5)
ERYTHROCYTE MEAN CORPUSCULAR HEMOGLOBIN CONCENTRATION (G/DL) BY AUTOMATED: 31.3 G/DL (ref 32–36)
ERYTHROCYTE MEAN CORPUSCULAR VOLUME (FL) BY AUTOMATED COUNT: 90 FL (ref 80–100)
ERYTHROCYTES (10*6/UL) IN BLOOD BY AUTOMATED COUNT: 5.06 X10E12/L (ref 4–5.2)
GFR FEMALE: 88 ML/MIN/1.73M2
GLUCOSE (MG/DL) IN SER/PLAS: 84 MG/DL (ref 74–99)
HEMATOCRIT (%) IN BLOOD BY AUTOMATED COUNT: 45.3 % (ref 36–46)
HEMOGLOBIN (G/DL) IN BLOOD: 14.2 G/DL (ref 12–16)
LEUKOCYTES (10*3/UL) IN BLOOD BY AUTOMATED COUNT: 6.7 X10E9/L (ref 4.4–11.3)
PLATELETS (10*3/UL) IN BLOOD AUTOMATED COUNT: 207 X10E9/L (ref 150–450)
POTASSIUM (MMOL/L) IN SER/PLAS: 4.2 MMOL/L (ref 3.5–5.3)
SODIUM (MMOL/L) IN SER/PLAS: 140 MMOL/L (ref 136–145)
UREA NITROGEN (MG/DL) IN SER/PLAS: 14 MG/DL (ref 6–23)

## 2023-06-12 ENCOUNTER — OFFICE VISIT (OUTPATIENT)
Dept: PRIMARY CARE | Facility: CLINIC | Age: 68
End: 2023-06-12
Payer: MEDICARE

## 2023-06-12 VITALS — SYSTOLIC BLOOD PRESSURE: 136 MMHG | HEART RATE: 92 BPM | DIASTOLIC BLOOD PRESSURE: 84 MMHG | OXYGEN SATURATION: 98 %

## 2023-06-12 DIAGNOSIS — E55.9 VITAMIN D DEFICIENCY: ICD-10-CM

## 2023-06-12 DIAGNOSIS — M79.89 LEG SWELLING: ICD-10-CM

## 2023-06-12 DIAGNOSIS — F51.04 PSYCHOPHYSIOLOGICAL INSOMNIA: ICD-10-CM

## 2023-06-12 DIAGNOSIS — R93.1 ABNORMAL ECHOCARDIOGRAM: Primary | ICD-10-CM

## 2023-06-12 DIAGNOSIS — I35.1 AORTIC VALVE INSUFFICIENCY, ETIOLOGY OF CARDIAC VALVE DISEASE UNSPECIFIED: ICD-10-CM

## 2023-06-12 DIAGNOSIS — G25.81 RLS (RESTLESS LEGS SYNDROME): ICD-10-CM

## 2023-06-12 DIAGNOSIS — E03.9 ACQUIRED HYPOTHYROIDISM: ICD-10-CM

## 2023-06-12 PROCEDURE — 99213 OFFICE O/P EST LOW 20 MIN: CPT | Performed by: FAMILY MEDICINE

## 2023-06-12 PROCEDURE — 3075F SYST BP GE 130 - 139MM HG: CPT | Performed by: FAMILY MEDICINE

## 2023-06-12 PROCEDURE — 3008F BODY MASS INDEX DOCD: CPT | Performed by: FAMILY MEDICINE

## 2023-06-12 PROCEDURE — 3079F DIAST BP 80-89 MM HG: CPT | Performed by: FAMILY MEDICINE

## 2023-06-12 PROCEDURE — 1036F TOBACCO NON-USER: CPT | Performed by: FAMILY MEDICINE

## 2023-06-12 PROCEDURE — 1160F RVW MEDS BY RX/DR IN RCRD: CPT | Performed by: FAMILY MEDICINE

## 2023-06-12 PROCEDURE — 1159F MED LIST DOCD IN RCRD: CPT | Performed by: FAMILY MEDICINE

## 2023-06-12 RX ORDER — TORSEMIDE 20 MG/1
2 TABLET ORAL 2 TIMES DAILY
COMMUNITY
Start: 2023-06-08

## 2023-06-12 NOTE — PROGRESS NOTES
Subjective     Chantelle Rao is a 68 y.o. female who presents for Procedure.      HPI  The patient is a 68 year-old female presenting to the clinic for follow up     Had been to cardiologist.  Scheduled for echocardiogram and cardiac catheterization on 19th  Educated on hypothyroidism.  Educated on insomnia and sleep hygiene.  History of leg swelling.  Denies PND.  Denies orthopnea.  No history of abnormal echocardiogram discussed aortic valve insufficiency.  Keep appoint with a specialist.  Educated on vitamin D deficiency we will continue monitor.  Educated on restless leg syndrome.        Review of Systems  Review of systems:    General:  Denies fever.  Denies chills.    HEENT: Denies nasal congestion.  Denies sinus pressure.    Respiratory:  Denies cough.  Denies shortness of breath.    Cardiovascular:  Denies chest pain.  Denies heart palpitations.  Denies shortness of breath.    Gastrointestinal:  Denies nausea vomiting diarrhea.  Denies abdominal pain.    Genitourinary: Denies burning urination.  Denies frequent urination.  Denies flank pain.  Denies blood in the urine.  Denies abnormal vaginal discharge.    Neurology:  Denies tingling numbness but denies weakness.  Denies headache.  Denies blurred vision.    Musculoskeletal:  Denies body aches.  Denies joint pains.  Denies muscle aches.  Denies muscle weakness    Endocrinology:  Dictated as above.    Psychiatric:  Denies depression.  Denies anxiety.  Denies suicidal.  Denies homicidal.      Objective   /84 (BP Location: Left arm)   Pulse 92   SpO2 98%          Physical Exam  General.  Not in distress.  HEENT normocephalic anicteric sclerae.  Neck soft supple no thyromegaly.  No carotid bruit.  Lungs are clear.  Heart regular.  Abdomen soft nontender nondistended bowel sounds are positive.  Extremities no clubbing cyanosis or edema.  Psychiatric.  Has good eye contact.  No crying spells noted.  Speech was normal.  Denies depression.  Denies suicidal.   Denies homicidal.      Assessment/Plan     1.  Abnormal echocardiogram.  Dictated as above  2.  Leg swelling.  Denies PND.  Denies orthopnea.  3.  Insomnia.  Educated on insomnia and sleep hygiene.  Continue current management.    4.  Hypothyroidism.  Educated on hypothyroidism.  We will continue monitor.    5.  Aortic valve insufficiency.  Dictated as above.  6.  Vitamin D deficiency.  Educated on vitamin D deficiency we will continue monitor    7.  Restless leg syndrome.  Educated on restless leg syndrome.  Continue current management.                                        Problem List Items Addressed This Visit          Nervous    Insomnia    RLS (restless legs syndrome)       Endocrine/Metabolic    Vitamin D deficiency    Hypothyroidism     Other Visit Diagnoses       Abnormal echocardiogram    -  Primary    Leg swelling        Aortic valve insufficiency, etiology of cardiac valve disease unspecified            Scribe Attestation  By signing my name below, IRosaura Scribe   attest that this documentation has been prepared under the direction and in the presence of Yang Fernandez MD.

## 2023-06-19 ENCOUNTER — HOSPITAL ENCOUNTER (OUTPATIENT)
Dept: DATA CONVERSION | Facility: HOSPITAL | Age: 68
End: 2023-06-19
Attending: INTERNAL MEDICINE | Admitting: INTERNAL MEDICINE
Payer: MEDICARE

## 2023-06-19 DIAGNOSIS — I35.1 NONRHEUMATIC AORTIC (VALVE) INSUFFICIENCY: ICD-10-CM

## 2023-06-19 DIAGNOSIS — E78.5 HYPERLIPIDEMIA, UNSPECIFIED: ICD-10-CM

## 2023-06-19 DIAGNOSIS — R07.9 CHEST PAIN, UNSPECIFIED: ICD-10-CM

## 2023-06-19 DIAGNOSIS — R06.00 DYSPNEA, UNSPECIFIED: ICD-10-CM

## 2023-06-20 DIAGNOSIS — E03.8 OTHER SPECIFIED HYPOTHYROIDISM: ICD-10-CM

## 2023-06-20 RX ORDER — LEVOTHYROXINE SODIUM 50 UG/1
50 TABLET ORAL
Qty: 90 TABLET | Refills: 0 | Status: SHIPPED | OUTPATIENT
Start: 2023-06-20 | End: 2023-06-26 | Stop reason: SDUPTHER

## 2023-06-22 ENCOUNTER — TELEPHONE (OUTPATIENT)
Dept: PRIMARY CARE | Facility: CLINIC | Age: 68
End: 2023-06-22
Payer: MEDICARE

## 2023-06-22 DIAGNOSIS — M25.551 RIGHT HIP PAIN: Primary | ICD-10-CM

## 2023-06-22 NOTE — TELEPHONE ENCOUNTER
Please contact patient and provide results and advise patient to discuss with neurologist MRI results    IMPRESSION:  Postsurgical changes of laminectomy on the right at L5-S1. There is  no significant spinal canal stenosis, mild left and severe right  neural foraminal stenosis at this level.     Degenerative changes of the lumbar spine without significant spinal  canal stenosis

## 2023-06-26 DIAGNOSIS — E03.8 OTHER SPECIFIED HYPOTHYROIDISM: ICD-10-CM

## 2023-06-26 RX ORDER — LEVOTHYROXINE SODIUM 50 UG/1
50 TABLET ORAL
Qty: 90 TABLET | Refills: 0 | Status: SHIPPED | OUTPATIENT
Start: 2023-06-26 | End: 2023-09-20 | Stop reason: SDUPTHER

## 2023-06-27 ENCOUNTER — TELEPHONE (OUTPATIENT)
Dept: PRIMARY CARE | Facility: CLINIC | Age: 68
End: 2023-06-27
Payer: MEDICARE

## 2023-06-27 DIAGNOSIS — G25.81 RLS (RESTLESS LEGS SYNDROME): ICD-10-CM

## 2023-06-27 RX ORDER — ROPINIROLE 2 MG/1
2 TABLET, FILM COATED ORAL 2 TIMES DAILY
Qty: 60 TABLET | Refills: 0 | Status: SHIPPED | OUTPATIENT
Start: 2023-06-27 | End: 2023-09-22 | Stop reason: SDUPTHER

## 2023-07-18 ENCOUNTER — OFFICE VISIT (OUTPATIENT)
Dept: PRIMARY CARE | Facility: CLINIC | Age: 68
End: 2023-07-18
Payer: MEDICARE

## 2023-07-18 VITALS
TEMPERATURE: 97.3 F | BODY MASS INDEX: 30.56 KG/M2 | DIASTOLIC BLOOD PRESSURE: 90 MMHG | HEIGHT: 64 IN | SYSTOLIC BLOOD PRESSURE: 150 MMHG | WEIGHT: 179 LBS

## 2023-07-18 DIAGNOSIS — R06.02 SOB (SHORTNESS OF BREATH) ON EXERTION: Primary | ICD-10-CM

## 2023-07-18 DIAGNOSIS — Z79.899 MEDICATION MANAGEMENT: ICD-10-CM

## 2023-07-18 DIAGNOSIS — J44.9 CHRONIC OBSTRUCTIVE PULMONARY DISEASE, UNSPECIFIED COPD TYPE (MULTI): ICD-10-CM

## 2023-07-18 DIAGNOSIS — Z87.891 PERSONAL HISTORY OF NICOTINE DEPENDENCE: ICD-10-CM

## 2023-07-18 PROCEDURE — 3077F SYST BP >= 140 MM HG: CPT

## 2023-07-18 PROCEDURE — 1125F AMNT PAIN NOTED PAIN PRSNT: CPT

## 2023-07-18 PROCEDURE — 80307 DRUG TEST PRSMV CHEM ANLYZR: CPT

## 2023-07-18 PROCEDURE — 1036F TOBACCO NON-USER: CPT

## 2023-07-18 PROCEDURE — 3080F DIAST BP >= 90 MM HG: CPT

## 2023-07-18 PROCEDURE — 1160F RVW MEDS BY RX/DR IN RCRD: CPT

## 2023-07-18 PROCEDURE — 1159F MED LIST DOCD IN RCRD: CPT

## 2023-07-18 PROCEDURE — 99204 OFFICE O/P NEW MOD 45 MIN: CPT

## 2023-07-18 PROCEDURE — 3008F BODY MASS INDEX DOCD: CPT

## 2023-07-18 RX ORDER — TIOTROPIUM BROMIDE 18 UG/1
1 CAPSULE ORAL; RESPIRATORY (INHALATION)
Qty: 30 CAPSULE | Refills: 5 | Status: SHIPPED | OUTPATIENT
Start: 2023-07-18 | End: 2023-11-21 | Stop reason: SDUPTHER

## 2023-07-18 RX ORDER — ALBUTEROL SULFATE 90 UG/1
2 AEROSOL, METERED RESPIRATORY (INHALATION) EVERY 4 HOURS PRN
Qty: 8 G | Refills: 5 | Status: SHIPPED | OUTPATIENT
Start: 2023-07-18 | End: 2023-11-21 | Stop reason: SDUPTHER

## 2023-07-18 ASSESSMENT — SOCIAL DETERMINANTS OF HEALTH (SDOH)

## 2023-07-18 ASSESSMENT — ENCOUNTER SYMPTOMS
LOSS OF SENSATION IN FEET: 0
WEAKNESS: 0
ALLERGIC/IMMUNOLOGIC NEGATIVE: 1
DEPRESSION: 0
ABDOMINAL PAIN: 0
GASTROINTESTINAL NEGATIVE: 1
OCCASIONAL FEELINGS OF UNSTEADINESS: 0
HEADACHES: 0
RESPIRATORY NEGATIVE: 1
FATIGUE: 0
MUSCULOSKELETAL NEGATIVE: 1
ENDOCRINE NEGATIVE: 1
FEVER: 0
CARDIOVASCULAR NEGATIVE: 1
DIZZINESS: 0
SHORTNESS OF BREATH: 0
UNEXPECTED WEIGHT CHANGE: 0
PSYCHIATRIC NEGATIVE: 1
ACTIVITY CHANGE: 0

## 2023-07-18 ASSESSMENT — PATIENT HEALTH QUESTIONNAIRE - PHQ9
2. FEELING DOWN, DEPRESSED OR HOPELESS: NOT AT ALL
1. LITTLE INTEREST OR PLEASURE IN DOING THINGS: NOT AT ALL
SUM OF ALL RESPONSES TO PHQ9 QUESTIONS 1 & 2: 0
1. LITTLE INTEREST OR PLEASURE IN DOING THINGS: NOT AT ALL

## 2023-07-18 ASSESSMENT — COLUMBIA-SUICIDE SEVERITY RATING SCALE - C-SSRS
1. IN THE PAST MONTH, HAVE YOU WISHED YOU WERE DEAD OR WISHED YOU COULD GO TO SLEEP AND NOT WAKE UP?: NO
2. HAVE YOU ACTUALLY HAD ANY THOUGHTS OF KILLING YOURSELF?: NO

## 2023-07-18 NOTE — PROGRESS NOTES
Subjective   Patient ID: Chantelle Rao is a 68 y.o. female who presents for Establish Care (Chantelle is here to establish care. She is needing a refill on zolpidem. Would like to discuss getting a scan of her lungs. She does have some nodules on her lungs. Unable to obtain pulse ox due to artifical nails.) and Pain (She has been having right sided pain. ).  Sleep Aids:   What is the patient's goal of therapy? sleep  Is this being achieved with current treatment? yes    Activities of Daily Living:   Is your overall impression that this patient is benefiting (symptom reduction outweighs side effects) from sleep aid therapy? Yes     1. Physical Functioning: Better  2. Family Relationship: Better  3. Social Relationship: Better  4. Mood: Better  5. Sleep Patterns: Better  6. Overall Function: Better    Endorses concerns about Lung Nodule, on CT Abd noted for RLL 8mm nodule, previous lung screenings showed <5mm nodule  She had ECHO as well as Cath last month with cardiology, mild valvular dysfunction, started on Torsemide at that time  Also Pulmonary consulted, but she cancelled the appointment    Discussed importance on making it to her specialist appointments as missing these would be detrimental to her health.         Vitals:    07/18/23 1332   BP: 150/90   Temp: 36.3 °C (97.3 °F)       Review of Systems   Constitutional:  Negative for activity change, fatigue, fever and unexpected weight change.   HENT: Negative.     Respiratory: Negative.  Negative for shortness of breath.    Cardiovascular: Negative.  Negative for chest pain.   Gastrointestinal: Negative.  Negative for abdominal pain.   Endocrine: Negative.    Musculoskeletal: Negative.    Skin: Negative.    Allergic/Immunologic: Negative.    Neurological:  Negative for dizziness, weakness and headaches.   Psychiatric/Behavioral: Negative.         Objective   Physical Exam  Vitals and nursing note reviewed.   Constitutional:       Appearance: Normal appearance.    HENT:      Head: Normocephalic.      Mouth/Throat:      Mouth: Mucous membranes are moist.   Cardiovascular:      Rate and Rhythm: Normal rate and regular rhythm.      Pulses: Normal pulses.      Heart sounds: Normal heart sounds. No murmur heard.     No friction rub. No gallop.   Pulmonary:      Effort: Pulmonary effort is normal. No respiratory distress.      Breath sounds: Normal breath sounds. No wheezing.   Abdominal:      General: Bowel sounds are normal. There is no distension.      Palpations: Abdomen is soft.      Tenderness: There is no abdominal tenderness.   Musculoskeletal:         General: No deformity. Normal range of motion.   Skin:     General: Skin is warm and dry.      Capillary Refill: Capillary refill takes less than 2 seconds.   Neurological:      General: No focal deficit present.      Mental Status: She is alert and oriented to person, place, and time.   Psychiatric:         Mood and Affect: Mood normal.         Assessment/Plan   Problem List Items Addressed This Visit       SOB (shortness of breath) on exertion - Primary    Relevant Medications    albuterol (Ventolin HFA) 90 mcg/actuation inhaler    tiotropium (Spiriva) 18 mcg inhalation capsule    Other Relevant Orders    CT lung screening low dose     Other Visit Diagnoses       Chronic obstructive pulmonary disease, unspecified COPD type (CMS/[facility])        Relevant Medications    albuterol (Ventolin HFA) 90 mcg/actuation inhaler    tiotropium (Spiriva) 18 mcg inhalation capsule    Other Relevant Orders    Referral to Pulmonology    Personal history of nicotine dependence        Relevant Orders    CT lung screening low dose    Medication management        Relevant Orders    Drug Screen, Urine With Reflex to Confirmation                 Thank you for coming in today, please call my office if you have any concerns or questions.     Antonio STACK, CNP

## 2023-07-19 DIAGNOSIS — G47.09 OTHER INSOMNIA: ICD-10-CM

## 2023-07-19 LAB
AMPHETAMINE (PRESENCE) IN URINE BY SCREEN METHOD: NORMAL
BARBITURATES PRESENCE IN URINE BY SCREEN METHOD: NORMAL
BENZODIAZEPINE (PRESENCE) IN URINE BY SCREEN METHOD: NORMAL
CANNABINOIDS IN URINE BY SCREEN METHOD: NORMAL
COCAINE (PRESENCE) IN URINE BY SCREEN METHOD: NORMAL
DRUG SCREEN COMMENT URINE: NORMAL
FENTANYL URINE: NORMAL
METHADONE (PRESENCE) IN URINE BY SCREEN METHOD: NORMAL
OPIATES (PRESENCE) IN URINE BY SCREEN METHOD: NORMAL
OXYCODONE (PRESENCE) IN URINE BY SCREEN METHOD: NORMAL
PHENCYCLIDINE (PRESENCE) IN URINE BY SCREEN METHOD: NORMAL

## 2023-07-19 RX ORDER — ZOLPIDEM TARTRATE 5 MG/1
5 TABLET ORAL NIGHTLY PRN
Qty: 30 TABLET | Refills: 0 | Status: SHIPPED | OUTPATIENT
Start: 2023-07-19 | End: 2023-08-24 | Stop reason: SDUPTHER

## 2023-07-25 ENCOUNTER — APPOINTMENT (OUTPATIENT)
Dept: PRIMARY CARE | Facility: CLINIC | Age: 68
End: 2023-07-25
Payer: MEDICARE

## 2023-08-01 DIAGNOSIS — R93.89 ABNORMAL CT SCAN: Primary | ICD-10-CM

## 2023-08-01 DIAGNOSIS — Z87.891 PERSONAL HISTORY OF NICOTINE DEPENDENCE: ICD-10-CM

## 2023-08-02 ENCOUNTER — TELEPHONE (OUTPATIENT)
Dept: PRIMARY CARE | Facility: CLINIC | Age: 68
End: 2023-08-02
Payer: MEDICARE

## 2023-08-02 NOTE — TELEPHONE ENCOUNTER
Spoke with Chantelle.  She is aware of result.  She will discuss results at her upcoming appointment

## 2023-08-02 NOTE — TELEPHONE ENCOUNTER
----- Message from NOVA Crane sent at 8/1/2023  1:02 PM EDT -----  Benign nodules again seen, recommended screening again next year. Ordering.

## 2023-08-07 ENCOUNTER — APPOINTMENT (OUTPATIENT)
Dept: PRIMARY CARE | Facility: CLINIC | Age: 68
End: 2023-08-07
Payer: MEDICARE

## 2023-08-09 ENCOUNTER — APPOINTMENT (OUTPATIENT)
Dept: PRIMARY CARE | Facility: CLINIC | Age: 68
End: 2023-08-09
Payer: MEDICARE

## 2023-08-10 ENCOUNTER — OFFICE VISIT (OUTPATIENT)
Dept: PRIMARY CARE | Facility: CLINIC | Age: 68
End: 2023-08-10
Payer: MEDICARE

## 2023-08-10 VITALS
OXYGEN SATURATION: 93 % | BODY MASS INDEX: 30.69 KG/M2 | TEMPERATURE: 98 F | SYSTOLIC BLOOD PRESSURE: 140 MMHG | WEIGHT: 176 LBS | DIASTOLIC BLOOD PRESSURE: 100 MMHG | HEART RATE: 118 BPM

## 2023-08-10 DIAGNOSIS — F41.9 ANXIETY: Primary | ICD-10-CM

## 2023-08-10 PROCEDURE — 1159F MED LIST DOCD IN RCRD: CPT

## 2023-08-10 PROCEDURE — 3077F SYST BP >= 140 MM HG: CPT

## 2023-08-10 PROCEDURE — 99214 OFFICE O/P EST MOD 30 MIN: CPT

## 2023-08-10 PROCEDURE — 3080F DIAST BP >= 90 MM HG: CPT

## 2023-08-10 PROCEDURE — 1160F RVW MEDS BY RX/DR IN RCRD: CPT

## 2023-08-10 PROCEDURE — 3008F BODY MASS INDEX DOCD: CPT

## 2023-08-10 PROCEDURE — 1125F AMNT PAIN NOTED PAIN PRSNT: CPT

## 2023-08-10 PROCEDURE — 1036F TOBACCO NON-USER: CPT

## 2023-08-10 RX ORDER — ALPRAZOLAM 0.5 MG/1
0.5 TABLET ORAL 3 TIMES DAILY PRN
Qty: 21 TABLET | Refills: 0 | Status: SHIPPED | OUTPATIENT
Start: 2023-08-10 | End: 2023-08-28 | Stop reason: SDUPTHER

## 2023-08-10 ASSESSMENT — ENCOUNTER SYMPTOMS
DECREASED CONCENTRATION: 1
COUGH: 0
NERVOUS/ANXIOUS: 1
SLEEP DISTURBANCE: 1
SHORTNESS OF BREATH: 1

## 2023-08-10 NOTE — PROGRESS NOTES
Subjective   Patient ID: Chantelle Rao is a 68 y.o. female who presents for Follow-up (Chantelle is here to her follow up. She is still breathing bad and is off balance. Is under a lot of stress right now. Sometimes it feels she can't catch her breath. Still isnt sleeping with the Ambien. ).  Follow up today for breathing, she states her breathing is no better today, lungs clear on auscultation  CT shows hyperinflation, likely COPD, nodules we are following with CT next year per guidelines    New stressors include her family, her daughter needs cosign on a loan and patient is concerned about her finances if she does this.   Tachycardic, regular rhythm    Start Xanax, patient would like to follow up in 2 weeks on her symptoms.         Vitals:    08/10/23 1119   BP: (!) 140/100   Pulse: (!) 118   Temp: 36.7 °C (98 °F)   SpO2: 93%       Review of Systems   Respiratory:  Positive for shortness of breath. Negative for cough.    Psychiatric/Behavioral:  Positive for decreased concentration and sleep disturbance. The patient is nervous/anxious.    All other systems reviewed and are negative.      Objective   Physical Exam  Vitals and nursing note reviewed.   Constitutional:       Appearance: Normal appearance.   HENT:      Head: Normocephalic.      Mouth/Throat:      Mouth: Mucous membranes are moist.   Cardiovascular:      Rate and Rhythm: Normal rate and regular rhythm.      Pulses: Normal pulses.      Heart sounds: Normal heart sounds. No murmur heard.     No friction rub. No gallop.   Pulmonary:      Effort: Pulmonary effort is normal. No respiratory distress.      Breath sounds: Normal breath sounds. No wheezing.   Abdominal:      General: Bowel sounds are normal. There is no distension.      Palpations: Abdomen is soft.      Tenderness: There is no abdominal tenderness.   Musculoskeletal:         General: No deformity. Normal range of motion.   Skin:     General: Skin is warm and dry.      Capillary Refill: Capillary  refill takes less than 2 seconds.   Neurological:      General: No focal deficit present.      Mental Status: She is alert and oriented to person, place, and time.   Psychiatric:         Mood and Affect: Mood is anxious.         Assessment/Plan   Problem List Items Addressed This Visit       Anxiety - Primary    Relevant Medications    ALPRAZolam (Xanax) 0.5 mg tablet            Thank you for coming in today, please call my office if you have any concerns or questions.     Antonio STACK, CNP

## 2023-08-24 ENCOUNTER — OFFICE VISIT (OUTPATIENT)
Dept: PRIMARY CARE | Facility: CLINIC | Age: 68
End: 2023-08-24
Payer: MEDICARE

## 2023-08-24 VITALS
WEIGHT: 176 LBS | BODY MASS INDEX: 30.69 KG/M2 | DIASTOLIC BLOOD PRESSURE: 100 MMHG | SYSTOLIC BLOOD PRESSURE: 160 MMHG | TEMPERATURE: 98.1 F

## 2023-08-24 DIAGNOSIS — G47.09 OTHER INSOMNIA: ICD-10-CM

## 2023-08-24 PROCEDURE — 1159F MED LIST DOCD IN RCRD: CPT

## 2023-08-24 PROCEDURE — 1125F AMNT PAIN NOTED PAIN PRSNT: CPT

## 2023-08-24 PROCEDURE — 3080F DIAST BP >= 90 MM HG: CPT

## 2023-08-24 PROCEDURE — 1036F TOBACCO NON-USER: CPT

## 2023-08-24 PROCEDURE — 99213 OFFICE O/P EST LOW 20 MIN: CPT

## 2023-08-24 PROCEDURE — 3008F BODY MASS INDEX DOCD: CPT

## 2023-08-24 PROCEDURE — 1160F RVW MEDS BY RX/DR IN RCRD: CPT

## 2023-08-24 PROCEDURE — 3077F SYST BP >= 140 MM HG: CPT

## 2023-08-24 RX ORDER — ZOLPIDEM TARTRATE 5 MG/1
5 TABLET ORAL NIGHTLY PRN
Qty: 45 TABLET | Refills: 0 | Status: SHIPPED | OUTPATIENT
Start: 2023-08-24 | End: 2023-09-19 | Stop reason: SDUPTHER

## 2023-08-24 ASSESSMENT — ENCOUNTER SYMPTOMS
DEPRESSION: 0
OCCASIONAL FEELINGS OF UNSTEADINESS: 0
LOSS OF SENSATION IN FEET: 0

## 2023-08-24 NOTE — PROGRESS NOTES
Subjective   Patient ID: Chantelle Rao is a 68 y.o. female who presents for Follow-up (Chantelle is here for a follow up of anxiety. Anxiety is about the same.  The medication is helping, needs more Ambien. She did have a lot of swelling yesterday, her breathing is still not the best. Unable to obtain pulse ox due to nails. ) and EATING (When she eats now she is getting sick. And is unable to eat rice feels like it gets stuck. ).  Anxiety follow up    Xanax is helping a lot, ambien she took two a couple of nights, this did help.   Stressors in life are high, she is noncompliant with torsemide, which does not help BP control         Vitals:    08/24/23 1047   BP: (!) 160/100   Temp: 36.7 °C (98.1 °F)       Review of Systems    Objective   Physical Exam    Assessment/Plan   Problem List Items Addressed This Visit       Insomnia    Relevant Medications    zolpidem (Ambien) 5 mg tablet            Thank you for coming in today, please call my office if you have any concerns or questions.     Antonio STACK, CNP

## 2023-08-24 NOTE — PATIENT INSTRUCTIONS
Take the torsemide as scheduled  Continue Xanax as needed, continue ambien    See in 2 weeks for follow up on blood pressure    Thank you for coming in today, if any questions or concerns arise, please call my office.   SEDA Crane-CNP

## 2023-08-28 DIAGNOSIS — F41.9 ANXIETY: ICD-10-CM

## 2023-08-28 RX ORDER — ALPRAZOLAM 0.5 MG/1
0.5 TABLET ORAL 3 TIMES DAILY PRN
Qty: 42 TABLET | Refills: 0 | Status: SHIPPED | OUTPATIENT
Start: 2023-08-28 | End: 2023-09-19 | Stop reason: SDUPTHER

## 2023-09-07 ENCOUNTER — APPOINTMENT (OUTPATIENT)
Dept: PRIMARY CARE | Facility: CLINIC | Age: 68
End: 2023-09-07
Payer: MEDICARE

## 2023-09-07 VITALS — HEIGHT: 63 IN | BODY MASS INDEX: 32.46 KG/M2 | WEIGHT: 183.2 LBS

## 2023-09-19 ENCOUNTER — OFFICE VISIT (OUTPATIENT)
Dept: PRIMARY CARE | Facility: CLINIC | Age: 68
End: 2023-09-19
Payer: MEDICARE

## 2023-09-19 VITALS
TEMPERATURE: 98.1 F | DIASTOLIC BLOOD PRESSURE: 70 MMHG | OXYGEN SATURATION: 97 % | SYSTOLIC BLOOD PRESSURE: 120 MMHG | HEART RATE: 93 BPM

## 2023-09-19 DIAGNOSIS — I10 PRIMARY HYPERTENSION: ICD-10-CM

## 2023-09-19 DIAGNOSIS — G47.09 OTHER INSOMNIA: ICD-10-CM

## 2023-09-19 DIAGNOSIS — F41.9 ANXIETY: ICD-10-CM

## 2023-09-19 DIAGNOSIS — G93.32 CHRONIC FATIGUE SYNDROME: Primary | ICD-10-CM

## 2023-09-19 DIAGNOSIS — E55.9 VITAMIN D DEFICIENCY, UNSPECIFIED: ICD-10-CM

## 2023-09-19 PROCEDURE — 1125F AMNT PAIN NOTED PAIN PRSNT: CPT

## 2023-09-19 PROCEDURE — 80061 LIPID PANEL: CPT

## 2023-09-19 PROCEDURE — 82607 VITAMIN B-12: CPT

## 2023-09-19 PROCEDURE — 1036F TOBACCO NON-USER: CPT

## 2023-09-19 PROCEDURE — 82306 VITAMIN D 25 HYDROXY: CPT

## 2023-09-19 PROCEDURE — 3078F DIAST BP <80 MM HG: CPT

## 2023-09-19 PROCEDURE — 99214 OFFICE O/P EST MOD 30 MIN: CPT

## 2023-09-19 PROCEDURE — 3074F SYST BP LT 130 MM HG: CPT

## 2023-09-19 PROCEDURE — 80053 COMPREHEN METABOLIC PANEL: CPT

## 2023-09-19 PROCEDURE — 1159F MED LIST DOCD IN RCRD: CPT

## 2023-09-19 PROCEDURE — 84439 ASSAY OF FREE THYROXINE: CPT

## 2023-09-19 PROCEDURE — 84443 ASSAY THYROID STIM HORMONE: CPT

## 2023-09-19 PROCEDURE — 85025 COMPLETE CBC W/AUTO DIFF WBC: CPT

## 2023-09-19 PROCEDURE — 3008F BODY MASS INDEX DOCD: CPT

## 2023-09-19 PROCEDURE — 1160F RVW MEDS BY RX/DR IN RCRD: CPT

## 2023-09-19 RX ORDER — ALPRAZOLAM 0.5 MG/1
0.5 TABLET ORAL 3 TIMES DAILY PRN
Qty: 42 TABLET | Refills: 0 | Status: SHIPPED | OUTPATIENT
Start: 2023-09-19 | End: 2023-11-08 | Stop reason: SDUPTHER

## 2023-09-19 RX ORDER — ZOLPIDEM TARTRATE 5 MG/1
5 TABLET ORAL NIGHTLY PRN
Qty: 45 TABLET | Refills: 0 | Status: SHIPPED | OUTPATIENT
Start: 2023-09-19 | End: 2023-11-21 | Stop reason: SDUPTHER

## 2023-09-19 ASSESSMENT — ENCOUNTER SYMPTOMS
ABDOMINAL PAIN: 0
FEVER: 0
PSYCHIATRIC NEGATIVE: 1
FATIGUE: 0
CARDIOVASCULAR NEGATIVE: 1
ENDOCRINE NEGATIVE: 1
SHORTNESS OF BREATH: 0
ALLERGIC/IMMUNOLOGIC NEGATIVE: 1
ACTIVITY CHANGE: 0
RESPIRATORY NEGATIVE: 1
MUSCULOSKELETAL NEGATIVE: 1
WEAKNESS: 0
HEADACHES: 0
GASTROINTESTINAL NEGATIVE: 1
DIZZINESS: 0
UNEXPECTED WEIGHT CHANGE: 0

## 2023-09-19 NOTE — PROGRESS NOTES
Subjective   Patient ID: Chantelle Rao is a 68 y.o. female who presents for Follow-up (Chantelle is here for a follow up of anxiety. Her previous blood she had high red and white blood cells. She hasn't been filling well. Her breathing is getting worse now that she is walking up and down the steps at her new place. She moved all by herself and noticed then that she is having troubles breathing. She has been weak and not feeling her self lately. Declined weight. ).  Chantelle presents following up for anxiety    Since last visit, she has moved out of the house where her daughter and grandchildren live, which has been a stress relief for her  She is anxious about the steps at her new home, sometimes is very short of breath on exertion, no distress today  She is seeing Pulm tomorrow.     Continues to take Ambien 10mg nightly, I discussed going back down to 5mg nightly now as she is sleeping better  Xanax is helping her symptoms significantly, we will continue this as well in the interim.         Vitals:    09/19/23 1049   BP: 120/70       Review of Systems   Constitutional:  Negative for activity change, fatigue, fever and unexpected weight change.   HENT: Negative.     Respiratory: Negative.  Negative for shortness of breath.    Cardiovascular: Negative.  Negative for chest pain.   Gastrointestinal: Negative.  Negative for abdominal pain.   Endocrine: Negative.    Musculoskeletal: Negative.    Skin: Negative.    Allergic/Immunologic: Negative.    Neurological:  Negative for dizziness, weakness and headaches.   Psychiatric/Behavioral: Negative.         Objective   Physical Exam  Vitals and nursing note reviewed.   Constitutional:       Appearance: Normal appearance.   HENT:      Head: Normocephalic.      Mouth/Throat:      Mouth: Mucous membranes are moist.   Cardiovascular:      Rate and Rhythm: Normal rate and regular rhythm.      Pulses: Normal pulses.      Heart sounds: Normal heart sounds. No murmur heard.     No friction  rub. No gallop.   Pulmonary:      Effort: Pulmonary effort is normal. No respiratory distress.      Breath sounds: Normal breath sounds. No wheezing.   Abdominal:      General: Bowel sounds are normal. There is no distension.      Palpations: Abdomen is soft.      Tenderness: There is no abdominal tenderness.   Musculoskeletal:         General: No deformity. Normal range of motion.   Skin:     General: Skin is warm and dry.      Capillary Refill: Capillary refill takes less than 2 seconds.   Neurological:      General: No focal deficit present.      Mental Status: She is alert and oriented to person, place, and time.   Psychiatric:         Mood and Affect: Mood normal.         Assessment/Plan   Problem List Items Addressed This Visit    None           Thank you for coming in today, please call my office if you have any concerns or questions.     Antonio STACK, CNP

## 2023-09-20 DIAGNOSIS — E03.8 OTHER SPECIFIED HYPOTHYROIDISM: ICD-10-CM

## 2023-09-20 LAB
ALANINE AMINOTRANSFERASE (SGPT) (U/L) IN SER/PLAS: 11 U/L (ref 7–45)
ALBUMIN (G/DL) IN SER/PLAS: 4.3 G/DL (ref 3.4–5)
ALKALINE PHOSPHATASE (U/L) IN SER/PLAS: 75 U/L (ref 33–136)
ANION GAP IN SER/PLAS: 15 MMOL/L (ref 10–20)
ASPARTATE AMINOTRANSFERASE (SGOT) (U/L) IN SER/PLAS: 14 U/L (ref 9–39)
BASOPHILS (10*3/UL) IN BLOOD BY AUTOMATED COUNT: 0.06 X10E9/L (ref 0–0.1)
BASOPHILS/100 LEUKOCYTES IN BLOOD BY AUTOMATED COUNT: 0.9 % (ref 0–2)
BILIRUBIN TOTAL (MG/DL) IN SER/PLAS: 0.4 MG/DL (ref 0–1.2)
CALCIDIOL (25 OH VITAMIN D3) (NG/ML) IN SER/PLAS: 49 NG/ML
CALCIUM (MG/DL) IN SER/PLAS: 9.8 MG/DL (ref 8.6–10.6)
CARBON DIOXIDE, TOTAL (MMOL/L) IN SER/PLAS: 28 MMOL/L (ref 21–32)
CHLORIDE (MMOL/L) IN SER/PLAS: 104 MMOL/L (ref 98–107)
CHOLESTEROL (MG/DL) IN SER/PLAS: 217 MG/DL (ref 0–199)
CHOLESTEROL IN HDL (MG/DL) IN SER/PLAS: 41 MG/DL
CHOLESTEROL/HDL RATIO: 5.3
COBALAMIN (VITAMIN B12) (PG/ML) IN SER/PLAS: 1002 PG/ML (ref 211–911)
CREATININE (MG/DL) IN SER/PLAS: 0.64 MG/DL (ref 0.5–1.05)
EOSINOPHILS (10*3/UL) IN BLOOD BY AUTOMATED COUNT: 0.27 X10E9/L (ref 0–0.7)
EOSINOPHILS/100 LEUKOCYTES IN BLOOD BY AUTOMATED COUNT: 4 % (ref 0–6)
ERYTHROCYTE DISTRIBUTION WIDTH (RATIO) BY AUTOMATED COUNT: 14.6 % (ref 11.5–14.5)
ERYTHROCYTE MEAN CORPUSCULAR HEMOGLOBIN CONCENTRATION (G/DL) BY AUTOMATED: 29.6 G/DL (ref 32–36)
ERYTHROCYTE MEAN CORPUSCULAR VOLUME (FL) BY AUTOMATED COUNT: 95 FL (ref 80–100)
ERYTHROCYTES (10*6/UL) IN BLOOD BY AUTOMATED COUNT: 4.77 X10E12/L (ref 4–5.2)
GFR FEMALE: >90 ML/MIN/1.73M2
GLUCOSE (MG/DL) IN SER/PLAS: 71 MG/DL (ref 74–99)
HEMATOCRIT (%) IN BLOOD BY AUTOMATED COUNT: 45.2 % (ref 36–46)
HEMOGLOBIN (G/DL) IN BLOOD: 13.4 G/DL (ref 12–16)
IMMATURE GRANULOCYTES/100 LEUKOCYTES IN BLOOD BY AUTOMATED COUNT: 0.4 % (ref 0–0.9)
LDL: 129 MG/DL (ref 0–99)
LEUKOCYTES (10*3/UL) IN BLOOD BY AUTOMATED COUNT: 6.7 X10E9/L (ref 4.4–11.3)
LYMPHOCYTES (10*3/UL) IN BLOOD BY AUTOMATED COUNT: 2.24 X10E9/L (ref 1.2–4.8)
LYMPHOCYTES/100 LEUKOCYTES IN BLOOD BY AUTOMATED COUNT: 33.6 % (ref 13–44)
MONOCYTES (10*3/UL) IN BLOOD BY AUTOMATED COUNT: 0.53 X10E9/L (ref 0.1–1)
MONOCYTES/100 LEUKOCYTES IN BLOOD BY AUTOMATED COUNT: 7.9 % (ref 2–10)
NEUTROPHILS (10*3/UL) IN BLOOD BY AUTOMATED COUNT: 3.54 X10E9/L (ref 1.2–7.7)
NEUTROPHILS/100 LEUKOCYTES IN BLOOD BY AUTOMATED COUNT: 53.2 % (ref 40–80)
NON HDL CHOLESTEROL: 176 MG/DL
NRBC (PER 100 WBCS) BY AUTOMATED COUNT: 0 /100 WBC (ref 0–0)
PLATELETS (10*3/UL) IN BLOOD AUTOMATED COUNT: 247 X10E9/L (ref 150–450)
POTASSIUM (MMOL/L) IN SER/PLAS: 4.8 MMOL/L (ref 3.5–5.3)
PROTEIN TOTAL: 7.2 G/DL (ref 6.4–8.2)
SODIUM (MMOL/L) IN SER/PLAS: 142 MMOL/L (ref 136–145)
THYROTROPIN (MIU/L) IN SER/PLAS BY DETECTION LIMIT <= 0.05 MIU/L: 0.36 MIU/L (ref 0.44–3.98)
THYROXINE (T4) FREE (NG/DL) IN SER/PLAS: 1.08 NG/DL (ref 0.78–1.48)
TRIGLYCERIDE (MG/DL) IN SER/PLAS: 237 MG/DL (ref 0–149)
UREA NITROGEN (MG/DL) IN SER/PLAS: 27 MG/DL (ref 6–23)
VLDL: 47 MG/DL (ref 0–40)

## 2023-09-20 RX ORDER — LEVOTHYROXINE SODIUM 25 UG/1
25 TABLET ORAL
Qty: 90 TABLET | Refills: 0 | Status: SHIPPED | OUTPATIENT
Start: 2023-09-20 | End: 2023-09-22 | Stop reason: SDUPTHER

## 2023-09-20 NOTE — RESULT ENCOUNTER NOTE
TSH is pretty low, I think we need to decrease the amount of Synthroid, I sent over 25mcg of medicine, of what she has currently, she can take the 50 mcg every other day until she runs out. Re-check in 2 months. I ordered this.

## 2023-09-21 ENCOUNTER — TELEPHONE (OUTPATIENT)
Dept: PRIMARY CARE | Facility: CLINIC | Age: 68
End: 2023-09-21
Payer: MEDICARE

## 2023-09-21 NOTE — TELEPHONE ENCOUNTER
Antonio Arambula, APRN-CNP  Rhett Ornelas MA  TSH is pretty low, I think we need to decrease the amount of Synthroid, I sent over 25mcg of medicine, of what she has currently, she can take the 50 mcg every other day until she runs out. Re-check in 2 months. I ordered this.

## 2023-09-22 DIAGNOSIS — G25.81 RLS (RESTLESS LEGS SYNDROME): ICD-10-CM

## 2023-09-22 DIAGNOSIS — E03.8 OTHER SPECIFIED HYPOTHYROIDISM: ICD-10-CM

## 2023-09-22 RX ORDER — ROPINIROLE 2 MG/1
2 TABLET, FILM COATED ORAL 2 TIMES DAILY
Qty: 60 TABLET | Refills: 0 | Status: SHIPPED | OUTPATIENT
Start: 2023-09-22 | End: 2023-11-21 | Stop reason: SDUPTHER

## 2023-09-22 RX ORDER — LEVOTHYROXINE SODIUM 25 UG/1
25 TABLET ORAL
Qty: 90 TABLET | Refills: 0 | Status: SHIPPED | OUTPATIENT
Start: 2023-09-22 | End: 2023-12-14 | Stop reason: SDUPTHER

## 2023-09-29 ENCOUNTER — TELEPHONE (OUTPATIENT)
Dept: PRIMARY CARE | Facility: CLINIC | Age: 68
End: 2023-09-29
Payer: MEDICARE

## 2023-09-29 NOTE — TELEPHONE ENCOUNTER
Chantelle called stating that per pharmacy she is unable to  the Ambien until Oct 6th. States when it was placed as 2 tablets at night it messed it up. States she isnt sleeping well at night would like to know what the next step is.

## 2023-09-30 NOTE — H&P
History & Physical Reviewed:   I have reviewed the History and Physical dated:  08-Jun-2023   History and Physical reviewed and relevant findings noted. Patient examined to review pertinent physical  findings.: No significant changes   Home Medications Reviewed: no changes noted   Allergies Reviewed: no changes noted       Airway/Sedation Assessment:  ·  Emotional Status calm   ·  Neurologic alert & oriented x 3   ·  Respiratory clear to auscultation   ·  Cardiovascular rhythm & rate regular   · Pulses present: Radial Right     ·  Mouth Opening OK yes   ·  Neck Flexibility OK yes   ·  Loose Teeth no   ·  Oropharyngeal Classification Class II   ·  ASA PS Classification ASA III   ·  Sedation Plan moderate sedation       ERAS (Enhanced Recovery After Surgery):  ·  ERAS Patient: no     Consent:   COVID-19 Consent:  ·  COVID-19 Risk Consent Surgeon has reviewed key risks related to the risk of chelly COVID-19 and if they contract COVID-19 what the risks are.       Electronic Signatures:  Froilan Dela Cruz)  (Signed 19-Jun-2023 11:30)   Authored: History & Physical Reviewed, Airway/Sedation,  ERAS, Consent, Note Completion      Last Updated: 19-Jun-2023 11:30 by Froilan Dela Cruz)

## 2023-10-02 ENCOUNTER — APPOINTMENT (OUTPATIENT)
Dept: PRIMARY CARE | Facility: CLINIC | Age: 68
End: 2023-10-02
Payer: MEDICARE

## 2023-11-08 ENCOUNTER — OFFICE VISIT (OUTPATIENT)
Dept: PRIMARY CARE | Facility: CLINIC | Age: 68
End: 2023-11-08
Payer: MEDICARE

## 2023-11-08 ENCOUNTER — ANCILLARY PROCEDURE (OUTPATIENT)
Dept: RADIOLOGY | Facility: CLINIC | Age: 68
End: 2023-11-08
Payer: MEDICARE

## 2023-11-08 VITALS
OXYGEN SATURATION: 99 % | TEMPERATURE: 98.1 F | DIASTOLIC BLOOD PRESSURE: 88 MMHG | WEIGHT: 163 LBS | HEART RATE: 91 BPM | SYSTOLIC BLOOD PRESSURE: 130 MMHG | BODY MASS INDEX: 28.42 KG/M2

## 2023-11-08 DIAGNOSIS — F41.9 ANXIETY: ICD-10-CM

## 2023-11-08 DIAGNOSIS — J18.9 COMMUNITY ACQUIRED PNEUMONIA, UNSPECIFIED LATERALITY: ICD-10-CM

## 2023-11-08 DIAGNOSIS — L02.212 ABSCESS OF BACK: ICD-10-CM

## 2023-11-08 DIAGNOSIS — J18.9 COMMUNITY ACQUIRED PNEUMONIA, UNSPECIFIED LATERALITY: Primary | ICD-10-CM

## 2023-11-08 PROCEDURE — 71046 X-RAY EXAM CHEST 2 VIEWS: CPT | Performed by: RADIOLOGY

## 2023-11-08 PROCEDURE — 99214 OFFICE O/P EST MOD 30 MIN: CPT

## 2023-11-08 PROCEDURE — 3008F BODY MASS INDEX DOCD: CPT

## 2023-11-08 PROCEDURE — 1125F AMNT PAIN NOTED PAIN PRSNT: CPT

## 2023-11-08 PROCEDURE — 1159F MED LIST DOCD IN RCRD: CPT

## 2023-11-08 PROCEDURE — 71046 X-RAY EXAM CHEST 2 VIEWS: CPT | Mod: FY

## 2023-11-08 PROCEDURE — 1160F RVW MEDS BY RX/DR IN RCRD: CPT

## 2023-11-08 PROCEDURE — 3075F SYST BP GE 130 - 139MM HG: CPT

## 2023-11-08 PROCEDURE — 3079F DIAST BP 80-89 MM HG: CPT

## 2023-11-08 PROCEDURE — 1036F TOBACCO NON-USER: CPT

## 2023-11-08 RX ORDER — AZITHROMYCIN 250 MG/1
TABLET, FILM COATED ORAL
Qty: 6 TABLET | Refills: 0 | Status: SHIPPED | OUTPATIENT
Start: 2023-11-08 | End: 2023-11-08

## 2023-11-08 RX ORDER — AZITHROMYCIN 250 MG/1
TABLET, FILM COATED ORAL
Qty: 6 TABLET | Refills: 0 | Status: SHIPPED | OUTPATIENT
Start: 2023-11-08 | End: 2023-11-12

## 2023-11-08 RX ORDER — AMOXICILLIN AND CLAVULANATE POTASSIUM 875; 125 MG/1; MG/1
875 TABLET, FILM COATED ORAL 2 TIMES DAILY
Qty: 20 TABLET | Refills: 0 | Status: SHIPPED | OUTPATIENT
Start: 2023-11-08 | End: 2023-11-21 | Stop reason: WASHOUT

## 2023-11-08 RX ORDER — AMOXICILLIN AND CLAVULANATE POTASSIUM 875; 125 MG/1; MG/1
875 TABLET, FILM COATED ORAL 2 TIMES DAILY
Qty: 20 TABLET | Refills: 0 | Status: SHIPPED | OUTPATIENT
Start: 2023-11-08 | End: 2023-11-08

## 2023-11-08 RX ORDER — ALPRAZOLAM 0.5 MG/1
0.5 TABLET ORAL 3 TIMES DAILY PRN
Qty: 42 TABLET | Refills: 0 | Status: SHIPPED | OUTPATIENT
Start: 2023-11-08 | End: 2024-01-15 | Stop reason: SDUPTHER

## 2023-11-08 ASSESSMENT — ENCOUNTER SYMPTOMS
HEADACHES: 0
FEVER: 1
HEARTBURN: 0
WHEEZING: 1
SHORTNESS OF BREATH: 1
HEMOPTYSIS: 0
SORE THROAT: 0
SWEATS: 0
RHINORRHEA: 1
CHILLS: 1
WEIGHT LOSS: 1
COUGH: 1
MYALGIAS: 0

## 2023-11-08 NOTE — PROGRESS NOTES
Subjective   Patient ID: Chantelle Rao is a 68 y.o. female who presents for Cough (Chantelle is here for coughing with phlegm. Does have an irritating ache on her side and chest is feeling heavy. Is wheezing and has had fevers off and on. Might have a spider bite on her back that is not healing the way it should. Bleeding with some puss. ).  Subjective  Chantelle Rao is a 68 y.o. female who presents for evaluation of cough, evaluation of fever, possible pneumonia. Symptoms include: shortness of breath on exertion, pleuritic chest pain, arthralgias, confusion, cough, fever to 102*f, and wheezing. Symptoms began 7 days ago, and have been gradually worsening since that time. Patient denies nausea and vomiting. Treatment thus far includes: OTC analgesics/antipyretics: ineffective. Past pulmonary history is significant for COPD and pneumonia.    Objective  Oxygen saturation 99% on room air  [unfilled]     Assessment/Plan  Community acquired pneumonia.    The diagnosis was discussed along with the usual course and treatment.  Educational material distributed.  Prescription analgesics per medication orders.  Prescription antitussive per medication orders.      Cough  This is a recurrent problem. The current episode started in the past 7 days. The problem has been waxing and waning. The problem occurs constantly. The cough is Productive of sputum. Associated symptoms include chills, ear congestion, a fever, nasal congestion, postnasal drip, rhinorrhea, shortness of breath, weight loss and wheezing. Pertinent negatives include no chest pain, ear pain, headaches, heartburn, hemoptysis, myalgias, rash, sore throat or sweats. Nothing aggravates the symptoms.       Vitals:    11/08/23 0846   BP: 130/88   Pulse: 91   Temp: 36.7 °C (98.1 °F)   SpO2: 99%       Review of Systems   Constitutional:  Positive for chills, fever and weight loss.   HENT:  Positive for postnasal drip and rhinorrhea. Negative for ear pain and sore throat.     Respiratory:  Positive for cough, shortness of breath and wheezing. Negative for hemoptysis.    Cardiovascular:  Negative for chest pain.   Gastrointestinal:  Negative for heartburn.   Musculoskeletal:  Negative for myalgias.   Skin:  Negative for rash.   Neurological:  Negative for headaches.       Objective   Physical Exam    Assessment/Plan   Problem List Items Addressed This Visit    None  Visit Diagnoses       Community acquired pneumonia, unspecified laterality    -  Primary    Relevant Medications    amoxicillin-pot clavulanate (Augmentin) 875-125 mg tablet    azithromycin (Zithromax) 250 mg tablet    Other Relevant Orders    XR chest 2 views    Abscess of back                     Thank you for coming in today, please call my office if you have any concerns or questions.     Antonio STACK, CNP

## 2023-11-08 NOTE — PATIENT INSTRUCTIONS
Double coverage antibiotics  If no improvement in 2 days, go to the ER  Chest XR ordered    Thank you for coming in today, if any questions or concerns arise, please call my office.   SEDA Crane-CNP

## 2023-11-08 NOTE — LETTER
November 9, 2023     Chantelle Rao  [address]  [facility] [address]      Dear Ms. Rao:    Below are the results from your recent visit:    Resulted Orders   XR chest 2 views    Narrative    Interpreted By:  Martín Peter,   STUDY:  XR CHEST 2 VIEWS  11/8/2023 10:01 am      INDICATION:  Signs/Symptoms:wheezing, rhonchi      COMPARISON:  01/09/2023      ACCESSION NUMBER(S):  [id number]      ORDERING CLINICIAN:  YESENIA GREENE      TECHNIQUE:  PA and lateral views of the chest were obtained.      FINDINGS:  No focal infiltrate, pleural effusion or pneumothorax is identified.  The cardiac silhouette is within normal limits for size. Moderate  discogenic degenerative changes are seen throughout the thoracic  spine.        Impression    No focal infiltrate or pneumothorax.      MACRO:  None.      Signed by: Martín Peter 11/9/2023 10:12 AM  Dictation workstation:   [id number]     The test results show that your current treatment is working.   If you have any questions or concerns, please don't hesitate to call.         Sincerely,   CHRISTOPHER Godwin

## 2023-11-09 ENCOUNTER — TELEPHONE (OUTPATIENT)
Dept: PRIMARY CARE | Facility: CLINIC | Age: 68
End: 2023-11-09
Payer: MEDICARE

## 2023-11-09 NOTE — TELEPHONE ENCOUNTER
----- Message from NOVA Crane sent at 11/9/2023 10:17 AM EST -----  No infiltrate noted. Continue current therapy

## 2023-11-21 ENCOUNTER — OFFICE VISIT (OUTPATIENT)
Dept: PRIMARY CARE | Facility: CLINIC | Age: 68
End: 2023-11-21
Payer: MEDICARE

## 2023-11-21 VITALS
TEMPERATURE: 97.4 F | DIASTOLIC BLOOD PRESSURE: 80 MMHG | HEART RATE: 92 BPM | SYSTOLIC BLOOD PRESSURE: 120 MMHG | OXYGEN SATURATION: 94 %

## 2023-11-21 DIAGNOSIS — R06.02 SOB (SHORTNESS OF BREATH) ON EXERTION: ICD-10-CM

## 2023-11-21 DIAGNOSIS — G25.81 RLS (RESTLESS LEGS SYNDROME): ICD-10-CM

## 2023-11-21 DIAGNOSIS — E03.8 OTHER SPECIFIED HYPOTHYROIDISM: ICD-10-CM

## 2023-11-21 DIAGNOSIS — G47.09 OTHER INSOMNIA: ICD-10-CM

## 2023-11-21 DIAGNOSIS — R42 VERTIGO: ICD-10-CM

## 2023-11-21 DIAGNOSIS — H69.93 DYSFUNCTION OF BOTH EUSTACHIAN TUBES: Primary | ICD-10-CM

## 2023-11-21 DIAGNOSIS — J44.9 CHRONIC OBSTRUCTIVE PULMONARY DISEASE, UNSPECIFIED COPD TYPE (MULTI): ICD-10-CM

## 2023-11-21 PROCEDURE — 36415 COLL VENOUS BLD VENIPUNCTURE: CPT

## 2023-11-21 PROCEDURE — 84443 ASSAY THYROID STIM HORMONE: CPT

## 2023-11-21 PROCEDURE — 99214 OFFICE O/P EST MOD 30 MIN: CPT

## 2023-11-21 PROCEDURE — 1125F AMNT PAIN NOTED PAIN PRSNT: CPT

## 2023-11-21 PROCEDURE — 1036F TOBACCO NON-USER: CPT

## 2023-11-21 PROCEDURE — 3079F DIAST BP 80-89 MM HG: CPT

## 2023-11-21 PROCEDURE — 1159F MED LIST DOCD IN RCRD: CPT

## 2023-11-21 PROCEDURE — 3074F SYST BP LT 130 MM HG: CPT

## 2023-11-21 PROCEDURE — 1160F RVW MEDS BY RX/DR IN RCRD: CPT

## 2023-11-21 PROCEDURE — 3008F BODY MASS INDEX DOCD: CPT

## 2023-11-21 RX ORDER — ZOLPIDEM TARTRATE 5 MG/1
5 TABLET ORAL NIGHTLY PRN
Qty: 45 TABLET | Refills: 0 | Status: SHIPPED | OUTPATIENT
Start: 2023-11-21 | End: 2024-02-12 | Stop reason: SDUPTHER

## 2023-11-21 RX ORDER — HYDROCORTISONE AND ACETIC ACID 20.75; 10.375 MG/ML; MG/ML
4 SOLUTION AURICULAR (OTIC) 4 TIMES DAILY PRN
Qty: 10 ML | Refills: 5 | Status: SHIPPED | OUTPATIENT
Start: 2023-11-21 | End: 2024-11-20

## 2023-11-21 RX ORDER — ALBUTEROL SULFATE 90 UG/1
2 AEROSOL, METERED RESPIRATORY (INHALATION) EVERY 4 HOURS PRN
Qty: 8 G | Refills: 5 | Status: SHIPPED | OUTPATIENT
Start: 2023-11-21 | End: 2024-11-20

## 2023-11-21 RX ORDER — MECLIZINE HCL 12.5 MG 12.5 MG/1
12.5 TABLET ORAL 3 TIMES DAILY PRN
Qty: 30 TABLET | Refills: 11 | Status: SHIPPED | OUTPATIENT
Start: 2023-11-21 | End: 2024-11-20

## 2023-11-21 RX ORDER — UMECLIDINIUM 62.5 UG/1
1 AEROSOL, POWDER ORAL DAILY
Qty: 30 EACH | Refills: 3 | Status: SHIPPED | OUTPATIENT
Start: 2023-11-21

## 2023-11-21 RX ORDER — ROPINIROLE 2 MG/1
2 TABLET, FILM COATED ORAL 2 TIMES DAILY
Qty: 60 TABLET | Refills: 0 | Status: SHIPPED | OUTPATIENT
Start: 2023-11-21 | End: 2023-12-27 | Stop reason: SDUPTHER

## 2023-11-21 RX ORDER — FLUTICASONE PROPIONATE 50 MCG
1 SPRAY, SUSPENSION (ML) NASAL DAILY
Qty: 16 G | Refills: 11 | Status: SHIPPED | OUTPATIENT
Start: 2023-11-21 | End: 2024-11-20

## 2023-11-21 RX ORDER — BENZONATATE 200 MG/1
200 CAPSULE ORAL 3 TIMES DAILY PRN
Qty: 30 CAPSULE | Refills: 0 | Status: SHIPPED | OUTPATIENT
Start: 2023-11-21 | End: 2023-12-01

## 2023-11-21 NOTE — PATIENT INSTRUCTIONS
Refill of breathing treatments to your pharmacy  I sent over cough pearls, antihistamine for the dizziness  Ear drops for the inflammation    Check TSH again today we will call with the results    Thank you for coming in today, if any questions or concerns arise, please call my office.   SEDA Crane-CNP

## 2023-11-21 NOTE — LETTER
November 22, 2023     Chantelle Rao  [address]  [facility] [address]      Dear Ms. Rao:    Below are the results from your recent visit:    Resulted Orders   TSH with reflex to Free T4 if abnormal   Result Value Ref Range    Thyroid Stimulating Hormone 1.03 0.44 - 3.98 mIU/L    Narrative    TSH testing is performed using different testing methodology at [facility] than at other [facility] hospitals. Direct result comparisons should only be made within the same method.       The test results show that your current treatment is working. Please continue current treatment.    If you have any questions or concerns, please don't hesitate to call.         Sincerely,    CHRISTOPHER Godwin

## 2023-11-22 ENCOUNTER — TELEPHONE (OUTPATIENT)
Dept: PRIMARY CARE | Facility: CLINIC | Age: 68
End: 2023-11-22
Payer: MEDICARE

## 2023-11-22 LAB — TSH SERPL-ACNC: 1.03 MIU/L (ref 0.44–3.98)

## 2023-11-22 NOTE — TELEPHONE ENCOUNTER
----- Message from SEDA Crane-CNP sent at 11/22/2023  8:16 AM EST -----  TSH within normal limits, continue therapy as we are.

## 2023-12-14 DIAGNOSIS — E03.8 OTHER SPECIFIED HYPOTHYROIDISM: ICD-10-CM

## 2023-12-14 RX ORDER — LEVOTHYROXINE SODIUM 25 UG/1
25 TABLET ORAL
Qty: 90 TABLET | Refills: 0 | Status: SHIPPED | OUTPATIENT
Start: 2023-12-14 | End: 2023-12-19 | Stop reason: SDUPTHER

## 2023-12-19 DIAGNOSIS — E03.8 OTHER SPECIFIED HYPOTHYROIDISM: ICD-10-CM

## 2023-12-19 RX ORDER — LEVOTHYROXINE SODIUM 25 UG/1
25 TABLET ORAL
Qty: 90 TABLET | Refills: 0 | Status: SHIPPED | OUTPATIENT
Start: 2023-12-19 | End: 2024-03-18 | Stop reason: SDUPTHER

## 2023-12-27 DIAGNOSIS — G25.81 RLS (RESTLESS LEGS SYNDROME): ICD-10-CM

## 2023-12-27 RX ORDER — ROPINIROLE 2 MG/1
2 TABLET, FILM COATED ORAL 2 TIMES DAILY
Qty: 60 TABLET | Refills: 0 | Status: SHIPPED | OUTPATIENT
Start: 2023-12-27 | End: 2023-12-28 | Stop reason: SDUPTHER

## 2023-12-28 DIAGNOSIS — G25.81 RLS (RESTLESS LEGS SYNDROME): ICD-10-CM

## 2023-12-28 RX ORDER — ROPINIROLE 2 MG/1
2 TABLET, FILM COATED ORAL 2 TIMES DAILY
Qty: 60 TABLET | Refills: 0 | OUTPATIENT
Start: 2023-12-28

## 2023-12-28 RX ORDER — ROPINIROLE 2 MG/1
2 TABLET, FILM COATED ORAL 2 TIMES DAILY
Qty: 60 TABLET | Refills: 0 | Status: SHIPPED | OUTPATIENT
Start: 2023-12-28 | End: 2024-02-14 | Stop reason: SDUPTHER

## 2024-01-15 ENCOUNTER — OFFICE VISIT (OUTPATIENT)
Dept: PRIMARY CARE | Facility: CLINIC | Age: 69
End: 2024-01-15
Payer: MEDICARE

## 2024-01-15 ENCOUNTER — ANCILLARY PROCEDURE (OUTPATIENT)
Dept: RADIOLOGY | Facility: CLINIC | Age: 69
End: 2024-01-15
Payer: MEDICARE

## 2024-01-15 VITALS
OXYGEN SATURATION: 93 % | SYSTOLIC BLOOD PRESSURE: 140 MMHG | DIASTOLIC BLOOD PRESSURE: 90 MMHG | HEART RATE: 101 BPM | TEMPERATURE: 97.6 F

## 2024-01-15 DIAGNOSIS — J44.1 CHRONIC OBSTRUCTIVE PULMONARY DISEASE WITH ACUTE EXACERBATION (MULTI): ICD-10-CM

## 2024-01-15 DIAGNOSIS — J44.1 CHRONIC OBSTRUCTIVE PULMONARY DISEASE WITH ACUTE EXACERBATION (MULTI): Primary | ICD-10-CM

## 2024-01-15 DIAGNOSIS — F41.9 ANXIETY: ICD-10-CM

## 2024-01-15 DIAGNOSIS — R53.82 CHRONIC FATIGUE: ICD-10-CM

## 2024-01-15 PROCEDURE — 3077F SYST BP >= 140 MM HG: CPT

## 2024-01-15 PROCEDURE — 71046 X-RAY EXAM CHEST 2 VIEWS: CPT

## 2024-01-15 PROCEDURE — 3080F DIAST BP >= 90 MM HG: CPT

## 2024-01-15 PROCEDURE — 3008F BODY MASS INDEX DOCD: CPT

## 2024-01-15 PROCEDURE — 71046 X-RAY EXAM CHEST 2 VIEWS: CPT | Performed by: RADIOLOGY

## 2024-01-15 PROCEDURE — 1036F TOBACCO NON-USER: CPT

## 2024-01-15 PROCEDURE — 99214 OFFICE O/P EST MOD 30 MIN: CPT

## 2024-01-15 PROCEDURE — 1125F AMNT PAIN NOTED PAIN PRSNT: CPT

## 2024-01-15 PROCEDURE — 1159F MED LIST DOCD IN RCRD: CPT

## 2024-01-15 PROCEDURE — 93000 ELECTROCARDIOGRAM COMPLETE: CPT

## 2024-01-15 RX ORDER — ALPRAZOLAM 0.5 MG/1
0.5 TABLET ORAL 3 TIMES DAILY PRN
Qty: 42 TABLET | Refills: 0 | Status: SHIPPED | OUTPATIENT
Start: 2024-01-15 | End: 2024-02-14 | Stop reason: SDUPTHER

## 2024-01-15 RX ORDER — PREDNISONE 10 MG/1
10 TABLET ORAL 3 TIMES DAILY
Qty: 9 TABLET | Refills: 0 | Status: SHIPPED | OUTPATIENT
Start: 2024-01-15 | End: 2024-01-18

## 2024-01-15 RX ORDER — LEVOFLOXACIN 500 MG/1
500 TABLET, FILM COATED ORAL DAILY
Qty: 7 TABLET | Refills: 0 | Status: SHIPPED | OUTPATIENT
Start: 2024-01-15 | End: 2024-01-22

## 2024-01-15 RX ORDER — BUDESONIDE, GLYCOPYRROLATE, AND FORMOTEROL FUMARATE 160; 9; 4.8 UG/1; UG/1; UG/1
2 AEROSOL, METERED RESPIRATORY (INHALATION)
Qty: 24 G | Refills: 2 | Status: SHIPPED | OUTPATIENT
Start: 2024-01-15 | End: 2024-04-14

## 2024-01-15 ASSESSMENT — ENCOUNTER SYMPTOMS
SHORTNESS OF BREATH: 1
ARTHRALGIAS: 1
EYES NEGATIVE: 1
FATIGUE: 1
NEUROLOGICAL NEGATIVE: 1
ENDOCRINE NEGATIVE: 1
HEMATOLOGIC/LYMPHATIC NEGATIVE: 1
GASTROINTESTINAL NEGATIVE: 1
PSYCHIATRIC NEGATIVE: 1
CHEST TIGHTNESS: 1
ALLERGIC/IMMUNOLOGIC NEGATIVE: 1

## 2024-01-15 NOTE — PROGRESS NOTES
Subjective   Patient ID: Chantelle Rao is a 68 y.o. female who presents for oxygen (Chantelle is here for low oxygen levels. Has been having troubles thinking straight and is tired all the time. She has been feeling very nausea and rapid heart rates. Her friend has woken her up from a nap due to stopping breathing. ).  Subjective  Chantelle Rao is a 68 y.o. female who presents for evaluation of chest pain. Onset was several week ago. Symptoms have been unchanged since that time. The patient describes the pain as pressure, squeezing and does not radiate. Patient rates pain as a 3/10 in intensity. Associated symptoms are: chest pressure/discomfort, dyspnea, and fatigue. Aggravating factors are: none. Alleviating factors are: rest. Patient's cardiac risk factors are: advanced age (older than 55 for men, 65 for women), dyslipidemia, hypertension, and sedentary lifestyle. Patient's risk factors for DVT/PE: none. Previous cardiac testing: chest x-ray and cholesterol.    Objective  [unfilled]     Cardiographics  ECG: normal sinus rhythm, no blocks or conduction defects, no ischemic changes    Imaging  Chest x-ray: not available for review.    Assessment/Plan  Chest pain, suspected etiology: costochondritis.  Patient history and exam consistent with non-cardiac cause of chest pain.  Conservative measures indicated.  Worsening signs and symptoms discussed and patient verbalized understanding.  Return precautions explained, if no better, to ER    Current symptoms include acute dyspnea. Symptoms have been present since several weeks ago and have been gradually worsening. She denies wheezing and fever. Associated symptoms include chest pain, chills, dizziness, fatigue, and myalgias.  This episode appears to have been triggered by cold air and emotional upset. Treatments tried for the current exacerbation: albuterol inhaler and ipratropium inhaler. The patient has been having similar episodes for approximately 2 week.    Current  exacerbation, lungs diminished, Chest xr, upgrade copd regimen to Breztri              Vitals:    01/15/24 0930   BP: 140/90   Pulse: 101   Temp: 36.4 °C (97.6 °F)   SpO2: 93%       Review of Systems   Constitutional:  Positive for fatigue.   HENT:  Positive for congestion.    Eyes: Negative.    Respiratory:  Positive for chest tightness and shortness of breath.    Cardiovascular:  Positive for chest pain.   Gastrointestinal: Negative.    Endocrine: Negative.    Genitourinary: Negative.    Musculoskeletal:  Positive for arthralgias.   Skin: Negative.    Allergic/Immunologic: Negative.    Neurological: Negative.    Hematological: Negative.    Psychiatric/Behavioral: Negative.         Objective   Physical Exam  Vitals reviewed.   Constitutional:       General: She is not in acute distress.     Appearance: Normal appearance. She is normal weight. She is not toxic-appearing.   HENT:      Head: Normocephalic.      Nose: Rhinorrhea present.      Mouth/Throat:      Mouth: Mucous membranes are moist.   Cardiovascular:      Rate and Rhythm: Normal rate and regular rhythm.      Pulses: Normal pulses.   Pulmonary:      Effort: Pulmonary effort is normal.      Breath sounds: Rhonchi present.   Neurological:      Mental Status: She is alert.         Assessment/Plan   Problem List Items Addressed This Visit       Anxiety    Relevant Medications    ALPRAZolam (Xanax) 0.5 mg tablet    Chronic fatigue    Relevant Orders    ECG 12 lead (Clinic Performed) (Completed)     Other Visit Diagnoses       Chronic obstructive pulmonary disease with acute exacerbation (CMS/[facility])    -  Primary    Relevant Medications    levoFLOXacin (Levaquin) 500 mg tablet    predniSONE (Deltasone) 10 mg tablet    budesonide-glycopyr-formoterol (Breztri Aerosphere) 160-9-4.8 mcg/actuation HFA aerosol inhaler    Other Relevant Orders    XR chest 2 views            Time Spent  Prep time on day of patient encounter: 10 minutes  Time spent directly with patient,  family or caregiver: 25 minutes  Additional Time Spent on Patient Care Activities: 5 minutes  Documentation Time: 10 minutes  Other Time Spent: 0 minutes  Total: 50 minutes        Thank you for coming in today, please call my office if you have any concerns or questions.     Antonio STACK, CNP

## 2024-01-15 NOTE — PATIENT INSTRUCTIONS
Breztri for further COPD management  Short dose steroid, Chest XR, antibiotic daily for the next week    Refill of Xanax sent    Thank you for coming in today, if any questions or concerns arise, please call my office.   SEDA Crane-CNP

## 2024-01-16 ENCOUNTER — TELEPHONE (OUTPATIENT)
Dept: PRIMARY CARE | Facility: CLINIC | Age: 69
End: 2024-01-16
Payer: MEDICARE

## 2024-01-16 NOTE — TELEPHONE ENCOUNTER
----- Message from NOVA Crane sent at 1/15/2024  1:29 PM EST -----  Results were abnormal... lungs hyperinflated in the presence of COPD, continue current treatment.

## 2024-02-05 ENCOUNTER — OFFICE VISIT (OUTPATIENT)
Dept: PRIMARY CARE | Facility: CLINIC | Age: 69
End: 2024-02-05
Payer: MEDICARE

## 2024-02-05 VITALS
DIASTOLIC BLOOD PRESSURE: 78 MMHG | TEMPERATURE: 99.2 F | HEART RATE: 110 BPM | OXYGEN SATURATION: 97 % | SYSTOLIC BLOOD PRESSURE: 118 MMHG

## 2024-02-05 DIAGNOSIS — R11.0 NAUSEA: Primary | ICD-10-CM

## 2024-02-05 DIAGNOSIS — N39.0 URINARY TRACT INFECTION WITHOUT HEMATURIA, SITE UNSPECIFIED: ICD-10-CM

## 2024-02-05 DIAGNOSIS — R51.9 NONINTRACTABLE HEADACHE, UNSPECIFIED CHRONICITY PATTERN, UNSPECIFIED HEADACHE TYPE: ICD-10-CM

## 2024-02-05 LAB
POC ALBUMIN /CREATININE RATIO MANUALLY ENTERED: >300 UG/MG CREAT
POC APPEARANCE, URINE: ABNORMAL
POC BILIRUBIN, URINE: NEGATIVE
POC BLOOD, URINE: ABNORMAL
POC COLOR, URINE: ABNORMAL
POC GLUCOSE, URINE: NEGATIVE MG/DL
POC KETONES, URINE: ABNORMAL MG/DL
POC LEUKOCYTES, URINE: ABNORMAL
POC NITRITE,URINE: NEGATIVE
POC PH, URINE: 6 PH
POC PROTEIN, URINE: ABNORMAL MG/DL
POC SPECIFIC GRAVITY, URINE: 1.01
POC URINE ALBUMIN: 150 MG/L
POC URINE CREATININE: 50 MG/DL
POC UROBILINOGEN, URINE: 0.2 EU/DL

## 2024-02-05 PROCEDURE — 3074F SYST BP LT 130 MM HG: CPT

## 2024-02-05 PROCEDURE — 93000 ELECTROCARDIOGRAM COMPLETE: CPT

## 2024-02-05 PROCEDURE — 99214 OFFICE O/P EST MOD 30 MIN: CPT

## 2024-02-05 PROCEDURE — 3008F BODY MASS INDEX DOCD: CPT

## 2024-02-05 PROCEDURE — 1160F RVW MEDS BY RX/DR IN RCRD: CPT

## 2024-02-05 PROCEDURE — 87086 URINE CULTURE/COLONY COUNT: CPT

## 2024-02-05 PROCEDURE — 87186 SC STD MICRODIL/AGAR DIL: CPT

## 2024-02-05 PROCEDURE — 1036F TOBACCO NON-USER: CPT

## 2024-02-05 PROCEDURE — 81003 URINALYSIS AUTO W/O SCOPE: CPT

## 2024-02-05 PROCEDURE — 82044 UR ALBUMIN SEMIQUANTITATIVE: CPT

## 2024-02-05 PROCEDURE — 1125F AMNT PAIN NOTED PAIN PRSNT: CPT

## 2024-02-05 PROCEDURE — 1159F MED LIST DOCD IN RCRD: CPT

## 2024-02-05 PROCEDURE — 3078F DIAST BP <80 MM HG: CPT

## 2024-02-05 RX ORDER — NAPROXEN 500 MG/1
500 TABLET ORAL 2 TIMES DAILY PRN
Qty: 60 TABLET | Refills: 0 | Status: SHIPPED | OUTPATIENT
Start: 2024-02-05 | End: 2024-05-05

## 2024-02-05 RX ORDER — ONDANSETRON 4 MG/1
4 TABLET, ORALLY DISINTEGRATING ORAL EVERY 8 HOURS PRN
Qty: 20 TABLET | Refills: 0 | Status: SHIPPED | OUTPATIENT
Start: 2024-02-05 | End: 2024-02-12

## 2024-02-05 RX ORDER — CEPHALEXIN 500 MG/1
500 CAPSULE ORAL 2 TIMES DAILY
Qty: 14 CAPSULE | Refills: 0 | Status: SHIPPED | OUTPATIENT
Start: 2024-02-05 | End: 2024-02-12

## 2024-02-05 ASSESSMENT — ENCOUNTER SYMPTOMS
CHILLS: 1
CHOKING: 0
DIFFICULTY URINATING: 1
FATIGUE: 1
NAUSEA: 1
CHEST TIGHTNESS: 0
COUGH: 0
FEVER: 1
PALPITATIONS: 0
SHORTNESS OF BREATH: 1
APPETITE CHANGE: 1
FREQUENCY: 1
ACTIVITY CHANGE: 1
VOMITING: 1

## 2024-02-05 NOTE — PROGRESS NOTES
Subjective   Patient ID: Chantelle Rao is a 68 y.o. female who presents for UTI (Chantelle is here for a possible bladder infection. States it is painful and sometimes she is unable to urinate. Has awful headaches, does have some dizziness. Did have 103 fever last night, this morning was 102. Declined weight).  Subjective  Chantelle Rao is a 68 y.o. female who complains of abnormal smelling urine, burning with urination, fever, bilateral flank pain, incomplete bladder emptying, and suprapubic pressure for 3 week. Patient does have a history of recurrent UTI or pyelonephritis.    History of antibiotic use three weeks prior Levaquin for CAP    [unfilled]    Objective  /78   Pulse 110   Temp 37.3 °C (99.2 °F)   SpO2 97%    General: alert and oriented, appears tachypneic, anxious today  States she is nauseated  Abdomen: soft, non-tender, without masses or organomegaly  Back: bilateral CVA tenderness    Laboratory:   Urine dipstick shows +leukocytes, +protein, +blood.    Micro exam: clear.    Assessment/Plan  Diagnoses and associated orders for this visit:    · Urinary tract infection without hematuria, site unspecified   POCT UA Automated manually resulted   Urine Culture   POCT Albumin Random Urine manually resulted    Nausea  Zofran ordered, EKG done Sinus Tachycardia    Encouraged increased fluid intake and if no better to be seen in the emergency department  Patient is tachypneic, understands the return precautions.   I did offer ER visit but patient declined.          UTI   Associated symptoms include chills, frequency, nausea and vomiting.       Vitals:    02/05/24 1138   BP: 118/78   Pulse: 110   Temp: 37.3 °C (99.2 °F)   SpO2: 97%       Review of Systems   Constitutional:  Positive for activity change, appetite change, chills, fatigue and fever.   Respiratory:  Positive for shortness of breath. Negative for cough, choking and chest tightness.    Cardiovascular:  Negative for chest pain and palpitations.    Gastrointestinal:  Positive for nausea and vomiting.   Genitourinary:  Positive for difficulty urinating and frequency.       Objective   Physical Exam  Vitals and nursing note reviewed.   Constitutional:       General: She is not in acute distress.     Appearance: She is ill-appearing.   Pulmonary:      Effort: Tachypnea present.      Breath sounds: Normal breath sounds.   Abdominal:      Tenderness: There is right CVA tenderness and left CVA tenderness.   Neurological:      Mental Status: She is alert.         Assessment/Plan   Problem List Items Addressed This Visit    None  Visit Diagnoses       Nausea    -  Primary    Relevant Medications    ondansetron ODT (Zofran-ODT) 4 mg disintegrating tablet    Other Relevant Orders    ECG 12 lead (Clinic Performed)    Urinary tract infection without hematuria, site unspecified        Relevant Medications    cephalexin (Keflex) 500 mg capsule    Other Relevant Orders    POCT UA Automated manually resulted (Completed)    Urine Culture    POCT Albumin Random Urine manually resulted (Completed)    Nonintractable headache, unspecified chronicity pattern, unspecified headache type        Relevant Medications    naproxen (Naprosyn) 500 mg tablet                 Thank you for coming in today, please call my office if you have any concerns or questions.     Antonio STACK, CNP

## 2024-02-06 ENCOUNTER — TELEPHONE (OUTPATIENT)
Dept: PRIMARY CARE | Facility: CLINIC | Age: 69
End: 2024-02-06
Payer: MEDICARE

## 2024-02-06 DIAGNOSIS — G43.809 OTHER MIGRAINE WITHOUT STATUS MIGRAINOSUS, NOT INTRACTABLE: Primary | ICD-10-CM

## 2024-02-06 RX ORDER — SUMATRIPTAN 50 MG/1
50 TABLET, FILM COATED ORAL ONCE AS NEEDED
Qty: 9 TABLET | Refills: 0 | Status: SHIPPED | OUTPATIENT
Start: 2024-02-06 | End: 2024-02-09 | Stop reason: SDUPTHER

## 2024-02-06 NOTE — TELEPHONE ENCOUNTER
Chantelle called requesting if she would be able to take 2 tablets of the medication for her headaches states that one is not helping.

## 2024-02-07 NOTE — TELEPHONE ENCOUNTER
Spoke with Chantelle at 9:33AM. She will go  new medication and contact us if this one isnt helping.

## 2024-02-08 LAB — BACTERIA UR CULT: ABNORMAL

## 2024-02-09 DIAGNOSIS — G43.809 OTHER MIGRAINE WITHOUT STATUS MIGRAINOSUS, NOT INTRACTABLE: ICD-10-CM

## 2024-02-09 DIAGNOSIS — R73.03 PREDIABETES: ICD-10-CM

## 2024-02-09 RX ORDER — SUMATRIPTAN 50 MG/1
50 TABLET, FILM COATED ORAL ONCE AS NEEDED
Qty: 30 TABLET | Refills: 0 | Status: SHIPPED | OUTPATIENT
Start: 2024-02-09 | End: 2024-02-12 | Stop reason: SDUPTHER

## 2024-02-12 ENCOUNTER — CLINICAL SUPPORT (OUTPATIENT)
Dept: PRIMARY CARE | Facility: CLINIC | Age: 69
End: 2024-02-12
Payer: MEDICARE

## 2024-02-12 DIAGNOSIS — R73.03 PREDIABETES: ICD-10-CM

## 2024-02-12 DIAGNOSIS — G47.09 OTHER INSOMNIA: ICD-10-CM

## 2024-02-12 DIAGNOSIS — G43.809 OTHER MIGRAINE WITHOUT STATUS MIGRAINOSUS, NOT INTRACTABLE: ICD-10-CM

## 2024-02-12 LAB
ALBUMIN SERPL BCP-MCNC: 3.4 G/DL (ref 3.4–5)
ALP SERPL-CCNC: 138 U/L (ref 33–136)
ALT SERPL W P-5'-P-CCNC: 41 U/L (ref 7–45)
ANION GAP SERPL CALC-SCNC: 14 MMOL/L (ref 10–20)
AST SERPL W P-5'-P-CCNC: 22 U/L (ref 9–39)
BASOPHILS # BLD AUTO: 0.08 X10*3/UL (ref 0–0.1)
BASOPHILS NFR BLD AUTO: 0.7 %
BILIRUB SERPL-MCNC: 0.3 MG/DL (ref 0–1.2)
BUN SERPL-MCNC: 18 MG/DL (ref 6–23)
CALCIUM SERPL-MCNC: 8.9 MG/DL (ref 8.6–10.3)
CHLORIDE SERPL-SCNC: 96 MMOL/L (ref 98–107)
CO2 SERPL-SCNC: 31 MMOL/L (ref 21–32)
CREAT SERPL-MCNC: 0.9 MG/DL (ref 0.5–1.05)
EGFRCR SERPLBLD CKD-EPI 2021: 70 ML/MIN/1.73M*2
EOSINOPHIL # BLD AUTO: 0.24 X10*3/UL (ref 0–0.7)
EOSINOPHIL NFR BLD AUTO: 2 %
ERYTHROCYTE [DISTWIDTH] IN BLOOD BY AUTOMATED COUNT: 14.8 % (ref 11.5–14.5)
GLUCOSE SERPL-MCNC: 82 MG/DL (ref 74–99)
HCT VFR BLD AUTO: 41.4 % (ref 36–46)
HGB BLD-MCNC: 12.8 G/DL (ref 12–16)
IMM GRANULOCYTES # BLD AUTO: 0.27 X10*3/UL (ref 0–0.7)
IMM GRANULOCYTES NFR BLD AUTO: 2.3 % (ref 0–0.9)
LYMPHOCYTES # BLD AUTO: 2.23 X10*3/UL (ref 1.2–4.8)
LYMPHOCYTES NFR BLD AUTO: 18.8 %
MCH RBC QN AUTO: 27.9 PG (ref 26–34)
MCHC RBC AUTO-ENTMCNC: 30.9 G/DL (ref 32–36)
MCV RBC AUTO: 90 FL (ref 80–100)
MONOCYTES # BLD AUTO: 0.89 X10*3/UL (ref 0.1–1)
MONOCYTES NFR BLD AUTO: 7.5 %
NEUTROPHILS # BLD AUTO: 8.14 X10*3/UL (ref 1.2–7.7)
NEUTROPHILS NFR BLD AUTO: 68.7 %
NRBC BLD-RTO: 0 /100 WBCS (ref 0–0)
PLATELET # BLD AUTO: 350 X10*3/UL (ref 150–450)
POTASSIUM SERPL-SCNC: 4.7 MMOL/L (ref 3.5–5.3)
PROT SERPL-MCNC: 6.6 G/DL (ref 6.4–8.2)
RBC # BLD AUTO: 4.58 X10*6/UL (ref 4–5.2)
SODIUM SERPL-SCNC: 136 MMOL/L (ref 136–145)
TSH SERPL-ACNC: 3.57 MIU/L (ref 0.44–3.98)
WBC # BLD AUTO: 11.9 X10*3/UL (ref 4.4–11.3)

## 2024-02-12 PROCEDURE — 84443 ASSAY THYROID STIM HORMONE: CPT

## 2024-02-12 PROCEDURE — 80053 COMPREHEN METABOLIC PANEL: CPT

## 2024-02-12 PROCEDURE — 36415 COLL VENOUS BLD VENIPUNCTURE: CPT

## 2024-02-12 PROCEDURE — 85025 COMPLETE CBC W/AUTO DIFF WBC: CPT

## 2024-02-12 RX ORDER — SUMATRIPTAN 50 MG/1
50 TABLET, FILM COATED ORAL ONCE AS NEEDED
Qty: 30 TABLET | Refills: 0 | Status: SHIPPED | OUTPATIENT
Start: 2024-02-12 | End: 2024-03-13

## 2024-02-12 RX ORDER — ZOLPIDEM TARTRATE 5 MG/1
5 TABLET ORAL NIGHTLY PRN
Qty: 45 TABLET | Refills: 0 | Status: SHIPPED | OUTPATIENT
Start: 2024-02-12 | End: 2024-04-17 | Stop reason: SDUPTHER

## 2024-02-13 ENCOUNTER — TELEPHONE (OUTPATIENT)
Dept: PRIMARY CARE | Facility: CLINIC | Age: 69
End: 2024-02-13
Payer: MEDICARE

## 2024-02-13 NOTE — TELEPHONE ENCOUNTER
----- Message from SEDA Crane-CNP sent at 2/13/2024  8:36 AM EST -----  Results are normal, please notify the patient. WBC increased, likely due to illness, otherwise labs are good

## 2024-02-13 NOTE — RESULT ENCOUNTER NOTE
Results are normal, please notify the patient. WBC increased, likely due to illness, otherwise labs are good

## 2024-02-14 DIAGNOSIS — G25.81 RLS (RESTLESS LEGS SYNDROME): ICD-10-CM

## 2024-02-14 DIAGNOSIS — F41.9 ANXIETY: ICD-10-CM

## 2024-02-14 RX ORDER — ALPRAZOLAM 0.5 MG/1
0.5 TABLET ORAL 3 TIMES DAILY PRN
Qty: 21 TABLET | Refills: 0 | Status: SHIPPED | OUTPATIENT
Start: 2024-02-14 | End: 2024-02-22 | Stop reason: SDUPTHER

## 2024-02-14 RX ORDER — ROPINIROLE 2 MG/1
2 TABLET, FILM COATED ORAL 2 TIMES DAILY
Qty: 14 TABLET | Refills: 0 | Status: SHIPPED | OUTPATIENT
Start: 2024-02-14 | End: 2024-02-22 | Stop reason: SDUPTHER

## 2024-02-22 ENCOUNTER — OFFICE VISIT (OUTPATIENT)
Dept: PRIMARY CARE | Facility: CLINIC | Age: 69
End: 2024-02-22
Payer: MEDICARE

## 2024-02-22 ENCOUNTER — TELEPHONE (OUTPATIENT)
Dept: PRIMARY CARE | Facility: CLINIC | Age: 69
End: 2024-02-22

## 2024-02-22 VITALS
HEART RATE: 103 BPM | BODY MASS INDEX: 26.68 KG/M2 | OXYGEN SATURATION: 91 % | TEMPERATURE: 97.7 F | SYSTOLIC BLOOD PRESSURE: 120 MMHG | WEIGHT: 153 LBS | DIASTOLIC BLOOD PRESSURE: 80 MMHG

## 2024-02-22 DIAGNOSIS — G25.81 RLS (RESTLESS LEGS SYNDROME): ICD-10-CM

## 2024-02-22 DIAGNOSIS — R10.30 LOWER ABDOMINAL PAIN: Primary | ICD-10-CM

## 2024-02-22 DIAGNOSIS — R39.89 SENSATION OF PRESSURE IN BLADDER AREA: ICD-10-CM

## 2024-02-22 DIAGNOSIS — F41.9 ANXIETY: ICD-10-CM

## 2024-02-22 DIAGNOSIS — R79.9 ABNORMAL FINDING OF BLOOD CHEMISTRY, UNSPECIFIED: ICD-10-CM

## 2024-02-22 DIAGNOSIS — R53.83 OTHER FATIGUE: ICD-10-CM

## 2024-02-22 LAB
FERRITIN SERPL-MCNC: 411 NG/ML (ref 8–150)
IRON SATN MFR SERPL: 19 % (ref 25–45)
IRON SERPL-MCNC: 52 UG/DL (ref 35–150)
POC APPEARANCE, URINE: CLEAR
POC BILIRUBIN, URINE: NEGATIVE
POC BLOOD, URINE: ABNORMAL
POC COLOR, URINE: YELLOW
POC GLUCOSE, URINE: NEGATIVE MG/DL
POC KETONES, URINE: NEGATIVE MG/DL
POC LEUKOCYTES, URINE: ABNORMAL
POC NITRITE,URINE: NEGATIVE
POC PH, URINE: 6 PH
POC PROTEIN, URINE: NEGATIVE MG/DL
POC SPECIFIC GRAVITY, URINE: 1.01
POC UROBILINOGEN, URINE: 0.2 EU/DL
TIBC SERPL-MCNC: 268 UG/DL (ref 240–445)
UIBC SERPL-MCNC: 216 UG/DL (ref 110–370)

## 2024-02-22 PROCEDURE — 3074F SYST BP LT 130 MM HG: CPT

## 2024-02-22 PROCEDURE — 99214 OFFICE O/P EST MOD 30 MIN: CPT

## 2024-02-22 PROCEDURE — 36415 COLL VENOUS BLD VENIPUNCTURE: CPT

## 2024-02-22 PROCEDURE — 87086 URINE CULTURE/COLONY COUNT: CPT

## 2024-02-22 PROCEDURE — 81003 URINALYSIS AUTO W/O SCOPE: CPT

## 2024-02-22 PROCEDURE — 3008F BODY MASS INDEX DOCD: CPT

## 2024-02-22 PROCEDURE — 83540 ASSAY OF IRON: CPT

## 2024-02-22 PROCEDURE — 1036F TOBACCO NON-USER: CPT

## 2024-02-22 PROCEDURE — 82728 ASSAY OF FERRITIN: CPT

## 2024-02-22 PROCEDURE — 3079F DIAST BP 80-89 MM HG: CPT

## 2024-02-22 PROCEDURE — 1125F AMNT PAIN NOTED PAIN PRSNT: CPT

## 2024-02-22 PROCEDURE — 1159F MED LIST DOCD IN RCRD: CPT

## 2024-02-22 PROCEDURE — 1160F RVW MEDS BY RX/DR IN RCRD: CPT

## 2024-02-22 PROCEDURE — 83550 IRON BINDING TEST: CPT

## 2024-02-22 RX ORDER — ALPRAZOLAM 0.5 MG/1
0.5 TABLET ORAL 3 TIMES DAILY PRN
Qty: 90 TABLET | Refills: 0 | Status: SHIPPED | OUTPATIENT
Start: 2024-02-22 | End: 2024-03-18 | Stop reason: SDUPTHER

## 2024-02-22 RX ORDER — ROPINIROLE 2 MG/1
2 TABLET, FILM COATED ORAL 2 TIMES DAILY
Qty: 60 TABLET | Refills: 0 | Status: SHIPPED | OUTPATIENT
Start: 2024-02-22 | End: 2024-04-09

## 2024-02-22 ASSESSMENT — ENCOUNTER SYMPTOMS
VOMITING: 1
APPETITE CHANGE: 1
BLOOD IN STOOL: 0
ABDOMINAL PAIN: 1
ACTIVITY CHANGE: 1
PALPITATIONS: 0
CONSTIPATION: 0
FEVER: 0
FATIGUE: 1
ARTHRALGIAS: 1
ABDOMINAL DISTENTION: 0
NAUSEA: 0
DIARRHEA: 0

## 2024-02-22 NOTE — PROGRESS NOTES
"Subjective   Patient ID: Chantelle Rao is a 68 y.o. female who presents for Follow-up (Chantelle is here for her follow up of bladder pressure. When she is using the bathroom, she is feeling extreme pressure, off and on. Is very tender. Discuss blood work. She is needing a refill on her controlled medications. ).  Chantelle presents today for continued lower abdominal pain, fatigue, and lack of motivation, as well as refills of benzo.    Fatigue   --no anemia on blood work, however RDW high with low MCV, suspect iron deficiency  --no vaginal bleeding, obtain iron and TIBC, otherwise previous blood work unremarkable    Lower abdominal pain  --pressure in \"bladder\" with bubbles in urine a few weeks ago, this is the first occurrence to my knowledge  --no more fever, she appears well today  --obtain CT abdomen with contrast and pelvic imaging     Refill  --benzo refilled today. UDS and contract at follow up visit.        Vitals:    02/22/24 0814   BP: 120/80   Pulse: 103   Temp: 36.5 °C (97.7 °F)   SpO2: 91%       Review of Systems   Constitutional:  Positive for activity change, appetite change and fatigue. Negative for fever.   Cardiovascular:  Negative for chest pain and palpitations.   Gastrointestinal:  Positive for abdominal pain and vomiting. Negative for abdominal distention, blood in stool, constipation, diarrhea and nausea.        Vomited x1 today in the morning.    Musculoskeletal:  Positive for arthralgias.       Objective   Physical Exam    Assessment/Plan   Problem List Items Addressed This Visit       Anxiety    Relevant Medications    ALPRAZolam (Xanax) 0.5 mg tablet     Other Visit Diagnoses       Lower abdominal pain    -  Primary    Relevant Orders    CT abdomen pelvis w IV contrast    Sensation of pressure in bladder area        Relevant Orders    POCT UA Automated manually resulted (Completed)    Urine Culture    Other fatigue        Relevant Orders    Iron and TIBC    Ferritin    Abnormal finding of blood " chemistry, unspecified        Relevant Orders    Iron and TIBC    Ferritin                 Thank you for coming in today, please call my office if you have any concerns or questions.     Antonio STACK, CNP

## 2024-02-22 NOTE — PATIENT INSTRUCTIONS
Obtain repeat urine culture  Ct abdomen pelvis with contrast    Check iron stores today    Thank you for coming in today, if any questions or concerns arise, please call my office.   SEDA Crane-CNP

## 2024-02-23 LAB — BACTERIA UR CULT: NORMAL

## 2024-03-08 ENCOUNTER — HOSPITAL ENCOUNTER (OUTPATIENT)
Dept: RADIOLOGY | Facility: HOSPITAL | Age: 69
Discharge: HOME | End: 2024-03-08
Payer: MEDICARE

## 2024-03-08 DIAGNOSIS — R10.30 LOWER ABDOMINAL PAIN: ICD-10-CM

## 2024-03-08 PROCEDURE — 74177 CT ABD & PELVIS W/CONTRAST: CPT | Performed by: RADIOLOGY

## 2024-03-08 PROCEDURE — 74177 CT ABD & PELVIS W/CONTRAST: CPT

## 2024-03-08 PROCEDURE — 2550000001 HC RX 255 CONTRASTS

## 2024-03-08 RX ADMIN — IOHEXOL 75 ML: 350 INJECTION, SOLUTION INTRAVENOUS at 13:01

## 2024-03-12 ENCOUNTER — TELEPHONE (OUTPATIENT)
Dept: PRIMARY CARE | Facility: CLINIC | Age: 69
End: 2024-03-12
Payer: MEDICARE

## 2024-03-12 DIAGNOSIS — R93.89 ABNORMAL CT SCAN: Primary | ICD-10-CM

## 2024-03-12 DIAGNOSIS — N30.00 ACUTE CYSTITIS WITHOUT HEMATURIA: ICD-10-CM

## 2024-03-12 NOTE — TELEPHONE ENCOUNTER
Spoke with Chantelle at 11:03AM. Verbalized understanding. She is wondering if her back pain could be resulting from this.

## 2024-03-12 NOTE — RESULT ENCOUNTER NOTE
Results were abnormal... lung markings possibly inflammation vs infection, ordered ct chest, concern for pyelonephritis, if she still feels sick, let's obtain another urine with culture. Orders being placed now. thanks

## 2024-03-12 NOTE — TELEPHONE ENCOUNTER
----- Message from NOVA Crane sent at 3/12/2024  8:25 AM EDT -----  Results were abnormal... lung markings possibly inflammation vs infection, ordered ct chest, concern for pyelonephritis, if she still feels sick, let's obtain another urine with culture. Orders being placed now. thanks

## 2024-03-18 ENCOUNTER — OFFICE VISIT (OUTPATIENT)
Dept: PRIMARY CARE | Facility: CLINIC | Age: 69
End: 2024-03-18
Payer: MEDICARE

## 2024-03-18 VITALS
OXYGEN SATURATION: 91 % | TEMPERATURE: 97 F | DIASTOLIC BLOOD PRESSURE: 80 MMHG | HEART RATE: 120 BPM | SYSTOLIC BLOOD PRESSURE: 120 MMHG

## 2024-03-18 DIAGNOSIS — E03.8 OTHER SPECIFIED HYPOTHYROIDISM: ICD-10-CM

## 2024-03-18 DIAGNOSIS — H66.93 ACUTE OTITIS MEDIA, BILATERAL: ICD-10-CM

## 2024-03-18 DIAGNOSIS — J42 CHRONIC BRONCHITIS, UNSPECIFIED CHRONIC BRONCHITIS TYPE (MULTI): Primary | ICD-10-CM

## 2024-03-18 DIAGNOSIS — Z79.899 ENCOUNTER FOR MEDICATION MANAGEMENT: ICD-10-CM

## 2024-03-18 DIAGNOSIS — F41.9 ANXIETY: ICD-10-CM

## 2024-03-18 DIAGNOSIS — M54.41 ACUTE BILATERAL LOW BACK PAIN WITH RIGHT-SIDED SCIATICA: ICD-10-CM

## 2024-03-18 DIAGNOSIS — Z02.89 MEDICATION MANAGEMENT CONTRACT AGREEMENT: ICD-10-CM

## 2024-03-18 DIAGNOSIS — N30.00 ACUTE CYSTITIS WITHOUT HEMATURIA: ICD-10-CM

## 2024-03-18 LAB
AMPHETAMINES UR QL SCN: ABNORMAL
BARBITURATES UR QL SCN: ABNORMAL
BZE UR QL SCN: ABNORMAL
CANNABINOIDS UR QL SCN: ABNORMAL
CREAT UR-MCNC: 19 MG/DL (ref 20–320)
PCP UR QL SCN: ABNORMAL
POC APPEARANCE, URINE: CLEAR
POC BILIRUBIN, URINE: NEGATIVE
POC BLOOD, URINE: ABNORMAL
POC COLOR, URINE: ABNORMAL
POC GLUCOSE, URINE: NEGATIVE MG/DL
POC KETONES, URINE: NEGATIVE MG/DL
POC LEUKOCYTES, URINE: ABNORMAL
POC NITRITE,URINE: NEGATIVE
POC PH, URINE: 7.5 PH
POC PROTEIN, URINE: NEGATIVE MG/DL
POC SPECIFIC GRAVITY, URINE: 1.01
POC UROBILINOGEN, URINE: 0.2 EU/DL
SP GR UR STRIP.AUTO: 1

## 2024-03-18 PROCEDURE — 80365 DRUG SCREENING OXYCODONE: CPT

## 2024-03-18 PROCEDURE — 3079F DIAST BP 80-89 MM HG: CPT

## 2024-03-18 PROCEDURE — 1036F TOBACCO NON-USER: CPT

## 2024-03-18 PROCEDURE — 80368 SEDATIVE HYPNOTICS: CPT

## 2024-03-18 PROCEDURE — 1159F MED LIST DOCD IN RCRD: CPT

## 2024-03-18 PROCEDURE — 80307 DRUG TEST PRSMV CHEM ANLYZR: CPT

## 2024-03-18 PROCEDURE — 80346 BENZODIAZEPINES1-12: CPT

## 2024-03-18 PROCEDURE — 99214 OFFICE O/P EST MOD 30 MIN: CPT

## 2024-03-18 PROCEDURE — 82570 ASSAY OF URINE CREATININE: CPT

## 2024-03-18 PROCEDURE — 1160F RVW MEDS BY RX/DR IN RCRD: CPT

## 2024-03-18 PROCEDURE — 3074F SYST BP LT 130 MM HG: CPT

## 2024-03-18 PROCEDURE — 87186 SC STD MICRODIL/AGAR DIL: CPT

## 2024-03-18 PROCEDURE — 87086 URINE CULTURE/COLONY COUNT: CPT

## 2024-03-18 PROCEDURE — 3008F BODY MASS INDEX DOCD: CPT

## 2024-03-18 PROCEDURE — 80354 DRUG SCREENING FENTANYL: CPT

## 2024-03-18 PROCEDURE — 80358 DRUG SCREENING METHADONE: CPT

## 2024-03-18 PROCEDURE — 81003 URINALYSIS AUTO W/O SCOPE: CPT

## 2024-03-18 PROCEDURE — 80361 OPIATES 1 OR MORE: CPT

## 2024-03-18 PROCEDURE — 80373 DRUG SCREENING TRAMADOL: CPT

## 2024-03-18 RX ORDER — DOXYCYCLINE 100 MG/1
100 TABLET ORAL 2 TIMES DAILY
Qty: 28 TABLET | Refills: 0 | Status: SHIPPED | OUTPATIENT
Start: 2024-03-18 | End: 2024-04-01

## 2024-03-18 RX ORDER — ALPRAZOLAM 0.5 MG/1
0.5 TABLET ORAL 3 TIMES DAILY PRN
Qty: 90 TABLET | Refills: 0 | Status: SHIPPED | OUTPATIENT
Start: 2024-03-18 | End: 2024-04-17

## 2024-03-18 RX ORDER — LEVOTHYROXINE SODIUM 25 UG/1
25 TABLET ORAL
Qty: 90 TABLET | Refills: 0 | Status: SHIPPED | OUTPATIENT
Start: 2024-03-18 | End: 2024-06-16

## 2024-03-18 ASSESSMENT — ENCOUNTER SYMPTOMS
GASTROINTESTINAL NEGATIVE: 1
RESPIRATORY NEGATIVE: 1
MYALGIAS: 1
SHORTNESS OF BREATH: 0
FEVER: 0
HEADACHES: 0
ALLERGIC/IMMUNOLOGIC NEGATIVE: 1
ARTHRALGIAS: 1
CARDIOVASCULAR NEGATIVE: 1
DYSPHORIC MOOD: 1
DIZZINESS: 0
FATIGUE: 1
ABDOMINAL PAIN: 0
UNEXPECTED WEIGHT CHANGE: 0
ENDOCRINE NEGATIVE: 1
WEAKNESS: 0
BACK PAIN: 1
ACTIVITY CHANGE: 1

## 2024-03-18 NOTE — PROGRESS NOTES
Subjective   Patient ID: Chantelle Rao is a 68 y.o. female who presents for Follow-up (Chantelle is here for her follow up for controlled. She has a black eye and is unsure of where it came form. Did not hit her eye on anything. Needs refill on xanax and levo. Declined weight).  Chantelle presents for controlled follow up and multiple complaints.    Chantelle presents to follow up urinary issue  --UA shows low amounts     Low Back pain  --l4-l5 arthritis with hip arthritis  --pain in left leg from hip to knee.  --had right hip replacement.  --refused PT  --had injections in the past.   --declines specialist referral, declines pain mgmt or orthopedic    Ear Pain  --erythematous and bulging bilateral TM  --lungs clear diminished bilaterally.    When asked about breathing, she stated at home her pulse ox goes to the 80s  --pulm referral  --doxy for positive CT findings as well as OM.        Vitals:    03/18/24 1308   BP: 120/80   Pulse: (!) 120   Temp: 36.1 °C (97 °F)   SpO2: 91%       Review of Systems   Constitutional:  Positive for activity change and fatigue. Negative for fever and unexpected weight change.   HENT:  Positive for congestion and ear pain.    Respiratory: Negative.  Negative for shortness of breath.    Cardiovascular: Negative.  Negative for chest pain.   Gastrointestinal: Negative.  Negative for abdominal pain.   Endocrine: Negative.    Musculoskeletal:  Positive for arthralgias, back pain, gait problem and myalgias.   Skin: Negative.    Allergic/Immunologic: Negative.    Neurological:  Negative for dizziness, weakness and headaches.   Psychiatric/Behavioral:  Positive for dysphoric mood.        Objective   Physical Exam  Vitals and nursing note reviewed.   Constitutional:       Appearance: Normal appearance.   HENT:      Head: Normocephalic.      Right Ear: Tympanic membrane is erythematous and bulging. Tympanic membrane is not perforated.      Left Ear: Tympanic membrane is erythematous and bulging. Tympanic  membrane is not perforated.      Mouth/Throat:      Mouth: Mucous membranes are moist.   Cardiovascular:      Rate and Rhythm: Normal rate and regular rhythm.      Pulses: Normal pulses.      Heart sounds: Normal heart sounds. No murmur heard.     No friction rub. No gallop.   Pulmonary:      Effort: Pulmonary effort is normal. No respiratory distress.      Breath sounds: Decreased air movement present. Examination of the right-lower field reveals decreased breath sounds. Examination of the left-lower field reveals decreased breath sounds. Decreased breath sounds present. No wheezing or rhonchi.   Abdominal:      General: Bowel sounds are normal. There is no distension.      Palpations: Abdomen is soft.      Tenderness: There is no abdominal tenderness.   Musculoskeletal:         General: No deformity. Normal range of motion.   Skin:     General: Skin is warm and dry.      Capillary Refill: Capillary refill takes less than 2 seconds.   Neurological:      General: No focal deficit present.      Mental Status: She is alert and oriented to person, place, and time.   Psychiatric:         Mood and Affect: Mood normal.         Assessment/Plan   Problem List Items Addressed This Visit       Anxiety    Relevant Medications    ALPRAZolam (Xanax) 0.5 mg tablet    Lower back pain    Relevant Orders    POCT UA Automated manually resulted (Completed)    Hypothyroidism    Relevant Medications    levothyroxine (Synthroid, Levoxyl) 25 mcg tablet     Other Visit Diagnoses       Chronic bronchitis, unspecified chronic bronchitis type (CMS/HCC)    -  Primary    Relevant Medications    doxycycline (Adoxa) 100 mg tablet    Other Relevant Orders    Referral to Pulmonology    Medication management contract agreement        Encounter for medication management        Relevant Orders    Opiate/Opioid/Benzo Prescription Compliance    OOB Internal Tracking    Acute cystitis without hematuria        Relevant Orders    Urine Culture    Acute  otitis media, bilateral                     Thank you for coming in today, please call my office if you have any concerns or questions.     Antonio STACK, CNP

## 2024-03-18 NOTE — PATIENT INSTRUCTIONS
Start Doxycycline for the breathing and for the ear infection  Get the CT Chest next week  Return if no better  See in 90 days for controlled follow up.    Thank you for coming in today, if any questions or concerns arise, please call my office.   SEDA Crane-CNP

## 2024-03-18 NOTE — LETTER
March 27, 2024     Chantelle Rao  [address]  [place] [address]      Dear Ms. Rao:    Below are the results from your recent visit:    Resulted Orders   POCT UA Automated manually resulted   Result Value Ref Range    POC Color, Urine Light-Yellow Straw, Yellow, Light-Yellow    POC Appearance, Urine Clear Clear    POC Glucose, Urine NEGATIVE NEGATIVE mg/dl    POC Bilirubin, Urine NEGATIVE NEGATIVE    POC Ketones, Urine NEGATIVE NEGATIVE mg/dl    POC Specific Gravity, Urine 1.015 1.005 - 1.035    POC Blood, Urine TRACE-Intact (A) NEGATIVE    POC PH, Urine 7.5 No Reference Range Established PH    POC Protein, Urine NEGATIVE NEGATIVE, 30 (1+) mg/dl    POC Urobilinogen, Urine 0.2 0.2, 1.0 EU/DL    Poc Nitrite, Urine NEGATIVE NEGATIVE    POC Leukocytes, Urine TRACE (A) NEGATIVE   Screen Opiate/Opioid/Benzo Prescription Compliance   Result Value Ref Range    Creatinine, Urine Random 19.0 (L) 20.0 - 320.0 mg/dL      Comment:      A urine creatinine result >= 20 mg/dL is considered valid without suspicion of dilution.  Samples with results below this range will automatically reflex to specific  gravity testing to verify specimen integrity.    Amphetamine Screen, Urine Presumptive Negative Presumptive Negative      Comment:      CUTOFF LEVEL: 500 NG/ML   Cross-reactivity has been reported with high concentrations   of the following drugs: buproprion, chloroquine, chlorpromazine,   ephedrine, mephentermine, fenfluramine, phentermine,   phenylpropanolamine, pseudoephedrine, and propranolol.    Barbiturate Screen, Urine Presumptive Negative Presumptive Negative      Comment:      CUTOFF LEVEL: 200 NG/ML    Cannabinoid Screen, Urine Presumptive Negative Presumptive Negative      Comment:      CUTOFF LEVEL: 50 NG/ML    Cocaine Metabolite Screen, Urine Presumptive Negative Presumptive Negative      Comment:      CUTOFF LEVEL: 150 NG/ML    PCP Screen, Urine Presumptive Negative Presumptive Negative      Comment:       CUTOFF LEVEL:  25 NG/ML  Cross-reactivity has been reported with dextromethorphan.   Confirmation Opiate/Opioid/Benzo Prescription Compliance   Result Value Ref Range    Clonazepam <25 <25 ng/mL    7-Aminoclonazepam <25 <25 ng/mL    Alprazolam <25 <25 ng/mL    Alpha-Hydroxyalprazolam 48 (H) <25 ng/mL      Comment:      Alprazolam metabolite; consistent with use of a drug containing alprazolam, such as Xanax.    Midazolam <25 <25 ng/mL    Alpha-Hydroxymidazolam <25 <25 ng/mL    Chlordiazepoxide <25 <25 ng/mL    Diazepam <25 <25 ng/mL    Nordiazepam <25 <25 ng/mL    Temazepam <25 <25 ng/mL    Oxazepam <25 <25 ng/mL    Lorazepam <25 <25 ng/mL    Methadone <25 <25 ng/mL    EDDP <25 <25 ng/mL    6-Acetylmorphine <25 <25 ng/mL    Codeine <50 <50 ng/mL    Hydrocodone <25 <25 ng/mL    Hydromorphone <25 <25 ng/mL    Morphine  <50 <50 ng/mL    Norhydrocodone <25 <25 ng/mL    Noroxycodone <25 <25 ng/mL    Oxycodone <25 <25 ng/mL    Oxymorphone <25 <25 ng/mL    Fentanyl <2.5 <2.5 ng/mL    Norfentanyl <2.5 <2.5 ng/mL    Tramadol <50 <50 ng/mL    O-Desmethyltramadol <50 <50 ng/mL    Zolpidem <25 <25 ng/mL    Zolpidem Metabolite (ZCA) <25 <25 ng/mL    Narrative    The performance characteristics of this test has  been validated by the individual  laboratory site where  testing is performed. It has not been cleared or approved  by the FDA. However the FDA has determined that such clearance  or approval is not necessary. Our Laboratory is certified under  the Clinical Laboratory Improvement Amendments of 1988 (CLIA)  as qualified to perform high complexity clinical laboratory testing.   Urine Culture   Result Value Ref Range    Urine Culture 20,000 - 80,000 Escherichia coli (A)    Specific Gravity, Urine   Result Value Ref Range    Specific Gravity, Urine 1.004 (N) 1.005 - 1.035     The test results came back normal. Please continue current treatment plan.     If you have any questions or concerns, please don't hesitate to  call.         Sincerely,        SEDA Crane-CNP

## 2024-03-19 ENCOUNTER — TELEPHONE (OUTPATIENT)
Dept: PULMONOLOGY | Facility: CLINIC | Age: 69
End: 2024-03-19
Payer: MEDICARE

## 2024-03-21 LAB
1OH-MIDAZOLAM UR CFM-MCNC: <25 NG/ML
6MAM UR CFM-MCNC: <25 NG/ML
7AMINOCLONAZEPAM UR CFM-MCNC: <25 NG/ML
A-OH ALPRAZ UR CFM-MCNC: 48 NG/ML
ALPRAZ UR CFM-MCNC: <25 NG/ML
BACTERIA UR CULT: ABNORMAL
CHLORDIAZEP UR CFM-MCNC: <25 NG/ML
CLONAZEPAM UR CFM-MCNC: <25 NG/ML
CODEINE UR CFM-MCNC: <50 NG/ML
DIAZEPAM UR CFM-MCNC: <25 NG/ML
EDDP UR CFM-MCNC: <25 NG/ML
FENTANYL UR CFM-MCNC: <2.5 NG/ML
HYDROCODONE CTO UR CFM-MCNC: <25 NG/ML
HYDROMORPHONE UR CFM-MCNC: <25 NG/ML
LORAZEPAM UR CFM-MCNC: <25 NG/ML
METHADONE UR CFM-MCNC: <25 NG/ML
MIDAZOLAM UR CFM-MCNC: <25 NG/ML
MORPHINE UR CFM-MCNC: <50 NG/ML
NORDIAZEPAM UR CFM-MCNC: <25 NG/ML
NORFENTANYL UR CFM-MCNC: <2.5 NG/ML
NORHYDROCODONE UR CFM-MCNC: <25 NG/ML
NOROXYCODONE UR CFM-MCNC: <25 NG/ML
NORTRAMADOL UR-MCNC: <50 NG/ML
OXAZEPAM UR CFM-MCNC: <25 NG/ML
OXYCODONE UR CFM-MCNC: <25 NG/ML
OXYMORPHONE UR CFM-MCNC: <25 NG/ML
TEMAZEPAM UR CFM-MCNC: <25 NG/ML
TRAMADOL UR CFM-MCNC: <50 NG/ML
ZOLPIDEM UR CFM-MCNC: <25 NG/ML
ZOLPIDEM UR-MCNC: <25 NG/ML

## 2024-03-26 ENCOUNTER — HOSPITAL ENCOUNTER (OUTPATIENT)
Dept: RADIOLOGY | Facility: HOSPITAL | Age: 69
Discharge: HOME | End: 2024-03-26
Payer: MEDICARE

## 2024-03-26 DIAGNOSIS — R93.89 ABNORMAL CT SCAN: ICD-10-CM

## 2024-03-26 PROCEDURE — 71250 CT THORAX DX C-: CPT | Performed by: RADIOLOGY

## 2024-03-26 PROCEDURE — 71250 CT THORAX DX C-: CPT

## 2024-03-27 ENCOUNTER — TELEPHONE (OUTPATIENT)
Dept: PRIMARY CARE | Facility: CLINIC | Age: 69
End: 2024-03-27
Payer: MEDICARE

## 2024-03-27 NOTE — RESULT ENCOUNTER NOTE
Results are normal, please notify the patient. Compliant with controlled. Her urine came back positive for E Coli, tetracycline should cover this.

## 2024-03-27 NOTE — TELEPHONE ENCOUNTER
----- Message from NOVA Crane sent at 3/27/2024  8:06 AM EDT -----  Results are normal, please notify the patient. Compliant with controlled. Her urine came back positive for E Coli, tetracycline should cover this.

## 2024-03-28 ENCOUNTER — TELEPHONE (OUTPATIENT)
Dept: PRIMARY CARE | Facility: CLINIC | Age: 69
End: 2024-03-28
Payer: MEDICARE

## 2024-03-28 NOTE — TELEPHONE ENCOUNTER
----- Message from SEDA Crane-CNP sent at 3/28/2024  8:03 AM EDT -----  Results are normal, please notify the patient. Slight improvement in the inflammatory process on CT, see Dr. Torres

## 2024-03-28 NOTE — RESULT ENCOUNTER NOTE
Results are normal, please notify the patient. Slight improvement in the inflammatory process on CT, see Dr. Torres

## 2024-03-28 NOTE — TELEPHONE ENCOUNTER
Spoke with Chantelle. Verbalized understanding.       The antibiotic that was sent helped a little, however she is still having a lot of bladder pressure and frequency with little urination.

## 2024-04-08 DIAGNOSIS — G25.81 RLS (RESTLESS LEGS SYNDROME): ICD-10-CM

## 2024-04-09 RX ORDER — ROPINIROLE 2 MG/1
2 TABLET, FILM COATED ORAL 2 TIMES DAILY
Qty: 60 TABLET | Refills: 0 | Status: SHIPPED | OUTPATIENT
Start: 2024-04-09 | End: 2024-05-20

## 2024-04-16 ENCOUNTER — OFFICE VISIT (OUTPATIENT)
Dept: PULMONOLOGY | Facility: CLINIC | Age: 69
End: 2024-04-16
Payer: MEDICARE

## 2024-04-16 VITALS
BODY MASS INDEX: 27.2 KG/M2 | SYSTOLIC BLOOD PRESSURE: 113 MMHG | DIASTOLIC BLOOD PRESSURE: 74 MMHG | OXYGEN SATURATION: 93 % | WEIGHT: 156 LBS | HEART RATE: 75 BPM

## 2024-04-16 DIAGNOSIS — J42 CHRONIC BRONCHITIS, UNSPECIFIED CHRONIC BRONCHITIS TYPE (MULTI): ICD-10-CM

## 2024-04-16 DIAGNOSIS — R06.02 SHORTNESS OF BREATH: Primary | ICD-10-CM

## 2024-04-16 PROCEDURE — 3078F DIAST BP <80 MM HG: CPT | Performed by: PEDIATRICS

## 2024-04-16 PROCEDURE — 3008F BODY MASS INDEX DOCD: CPT | Performed by: PEDIATRICS

## 2024-04-16 PROCEDURE — 1159F MED LIST DOCD IN RCRD: CPT | Performed by: PEDIATRICS

## 2024-04-16 PROCEDURE — 3074F SYST BP LT 130 MM HG: CPT | Performed by: PEDIATRICS

## 2024-04-16 PROCEDURE — 1036F TOBACCO NON-USER: CPT | Performed by: PEDIATRICS

## 2024-04-16 PROCEDURE — 99215 OFFICE O/P EST HI 40 MIN: CPT | Performed by: PEDIATRICS

## 2024-04-16 PROCEDURE — 1160F RVW MEDS BY RX/DR IN RCRD: CPT | Performed by: PEDIATRICS

## 2024-04-16 ASSESSMENT — PATIENT HEALTH QUESTIONNAIRE - PHQ9
SUM OF ALL RESPONSES TO PHQ9 QUESTIONS 1 AND 2: 0
2. FEELING DOWN, DEPRESSED OR HOPELESS: NOT AT ALL
1. LITTLE INTEREST OR PLEASURE IN DOING THINGS: NOT AT ALL

## 2024-04-16 NOTE — LETTER
"April 16, 2024     Antonio Arambula, APRN-CNP  [address] [address]    Patient: Chantelle Rao   YOB: 1955   Date of Visit: 4/16/2024       Dear Dr. Antonio Arambula, APRN-CNP:    Thank you for referring Chantelle Rao to me for evaluation. Below are my notes for this consultation.  If you have questions, please do not hesitate to call me. I look forward to following your patient along with you.       Sincerely,     Addy Martin MD      CC: No Recipients  ______________________________________________________________________________________    Subjective  Patient ID: Chantelle Rao is a 68 y.o. female who presents for CHAVIRA, ?COPD, ?hypoxia    HPI    Ms Rao has been short of breath for several years,  this has gotten worse over time.  She will get short of breath with walking stairs or at times if she is talking a lot.  She does not get short of breath at rest.  She has been given albuterol which she does not think does anything, she was prescribed incruse and bretri but she never tried either of those.    She had a CT chest that shows some tree-in-bud findings  And several calcified and noncalcified nodules.  The nodules have been stable, the tree-in-bud was seen in 2023 worse on CT abdomen and after antibiotics better on repeat CT chest.  She also has a high [place] score on screening, she states in the past she was told she stops breathing but is not interested in evaluation for sleep apnea.    I think she is a former smoker but she was very circuitous regarding smoking history.  \"I didn't smoke, well I did a long time ago and smoked quite a bit, but I wasn't a smoker smoker\" She states she was exposed to a lot of secondhand smoke as well.      Review of Systems    See scanned documents attached to this note for review of systems, and appropriate scales/scores for this visit.     Objective  Physical Exam  Constitutional:       Appearance: Normal appearance.   HENT:      Head: " Normocephalic and atraumatic.      Mouth/Throat:      Pharynx: Oropharynx is clear.   Cardiovascular:      Rate and Rhythm: Normal rate and regular rhythm.      Pulses: Normal pulses.      Heart sounds: Normal heart sounds.   Pulmonary:      Effort: Pulmonary effort is normal.      Breath sounds: Normal breath sounds. No wheezing, rhonchi or rales.   Abdominal:      General: Bowel sounds are normal.      Palpations: Abdomen is soft.   Musculoskeletal:         General: Normal range of motion.   Skin:     General: Skin is warm and dry.   Neurological:      General: No focal deficit present.      Mental Status: She is alert and oriented to person, place, and time.   Psychiatric:         Mood and Affect: Mood normal.         Assessment/Plan    68yr old with worsening shortness of breath with activity and lung nodules.    CHAVIRA:  unclear why, ?COPD but I am not clear regarding smoking history, COPD does occur in non-smokers but that is rare.  - will get PFT.  I explained why the test was being done several times. Despite that I am not sure she understood  - albuterol as needed    2. ?hypoxia:  she states her oxygen levels are low sometimes.  - will get 6MWT    3. Lung nodules:  some calcified (benign) some not but stable.  Tree-in-bud now appears similar to previous scans so likely micronodules   - repeat CT in 1 year    4. Likely THALIA:    - she is not interested in work up, I explained there are other options other than PAP therapy.  She declined    Follow up after PFT and 6MWT       Addy Martin MD 04/16/24 9:20 AM

## 2024-04-16 NOTE — PROGRESS NOTES
"Subjective   Patient ID: Chantelle Rao is a 68 y.o. female who presents for CHAVIRA, ?COPD, ?hypoxia    HPI    Ms Rao has been short of breath for several years,  this has gotten worse over time.  She will get short of breath with walking stairs or at times if she is talking a lot.  She does not get short of breath at rest.  She has been given albuterol which she does not think does anything, she was prescribed incruse and bretri but she never tried either of those.    She had a CT chest that shows some tree-in-bud findings  And several calcified and noncalcified nodules.  The nodules have been stable, the tree-in-bud was seen in 2023 worse on CT abdomen and after antibiotics better on repeat CT chest.  She also has a high [place] score on screening, she states in the past she was told she stops breathing but is not interested in evaluation for sleep apnea.    I think she is a former smoker but she was very circuitous regarding smoking history.  \"I didn't smoke, well I did a long time ago and smoked quite a bit, but I wasn't a smoker smoker\" She states she was exposed to a lot of secondhand smoke as well.      Review of Systems    See scanned documents attached to this note for review of systems, and appropriate scales/scores for this visit.     Objective   Physical Exam  Constitutional:       Appearance: Normal appearance.   HENT:      Head: Normocephalic and atraumatic.      Mouth/Throat:      Pharynx: Oropharynx is clear.   Cardiovascular:      Rate and Rhythm: Normal rate and regular rhythm.      Pulses: Normal pulses.      Heart sounds: Normal heart sounds.   Pulmonary:      Effort: Pulmonary effort is normal.      Breath sounds: Normal breath sounds. No wheezing, rhonchi or rales.   Abdominal:      General: Bowel sounds are normal.      Palpations: Abdomen is soft.   Musculoskeletal:         General: Normal range of motion.   Skin:     General: Skin is warm and dry.   Neurological:      General: No focal deficit " present.      Mental Status: She is alert and oriented to person, place, and time.   Psychiatric:         Mood and Affect: Mood normal.         Assessment/Plan     68yr old with worsening shortness of breath with activity and lung nodules.    CHAVIRA:  unclear why, ?COPD but I am not clear regarding smoking history, COPD does occur in non-smokers but that is rare.  - will get PFT.  I explained why the test was being done several times. Despite that I am not sure she understood  - albuterol as needed    2. ?hypoxia:  she states her oxygen levels are low sometimes.  - will get 6MWT    3. Lung nodules:  some calcified (benign) some not but stable.  Tree-in-bud now appears similar to previous scans so likely micronodules   - repeat CT in 1 year    4. Likely THALIA:    - she is not interested in work up, I explained there are other options other than PAP therapy.  She declined    Follow up after PFT and 6MWT       Addy Martin MD 04/16/24 9:20 AM

## 2024-04-17 DIAGNOSIS — G47.09 OTHER INSOMNIA: ICD-10-CM

## 2024-04-17 RX ORDER — ZOLPIDEM TARTRATE 5 MG/1
5 TABLET ORAL NIGHTLY PRN
Qty: 45 TABLET | Refills: 0 | Status: SHIPPED | OUTPATIENT
Start: 2024-04-17

## 2024-05-06 ENCOUNTER — APPOINTMENT (OUTPATIENT)
Dept: RESPIRATORY THERAPY | Facility: HOSPITAL | Age: 69
End: 2024-05-06
Payer: MEDICARE

## 2024-05-14 ENCOUNTER — APPOINTMENT (OUTPATIENT)
Dept: PULMONOLOGY | Facility: CLINIC | Age: 69
End: 2024-05-14
Payer: MEDICARE

## 2024-05-17 DIAGNOSIS — G25.81 RLS (RESTLESS LEGS SYNDROME): ICD-10-CM

## 2024-05-20 ENCOUNTER — PATIENT MESSAGE (OUTPATIENT)
Dept: PRIMARY CARE | Facility: CLINIC | Age: 69
End: 2024-05-20
Payer: MEDICARE

## 2024-05-20 RX ORDER — ROPINIROLE 2 MG/1
2 TABLET, FILM COATED ORAL 2 TIMES DAILY
Qty: 60 TABLET | Refills: 0 | Status: SHIPPED | OUTPATIENT
Start: 2024-05-20 | End: 2024-06-19

## 2024-05-20 NOTE — TELEPHONE ENCOUNTER
From: Chantelle Rao  To: Antonio Arambula  Sent: 5/20/2024 8:20 AM EDT  Subject: Refill    Need a refill on my requip for my restless legs

## 2024-06-18 ENCOUNTER — APPOINTMENT (OUTPATIENT)
Dept: PRIMARY CARE | Facility: CLINIC | Age: 69
End: 2024-06-18
Payer: MEDICARE

## 2024-06-18 VITALS
DIASTOLIC BLOOD PRESSURE: 80 MMHG | OXYGEN SATURATION: 95 % | TEMPERATURE: 96.8 F | SYSTOLIC BLOOD PRESSURE: 126 MMHG | HEART RATE: 91 BPM

## 2024-06-18 DIAGNOSIS — K92.1 BLOOD IN STOOL: ICD-10-CM

## 2024-06-18 DIAGNOSIS — D50.9 IRON DEFICIENCY ANEMIA, UNSPECIFIED IRON DEFICIENCY ANEMIA TYPE: ICD-10-CM

## 2024-06-18 DIAGNOSIS — G25.81 RLS (RESTLESS LEGS SYNDROME): ICD-10-CM

## 2024-06-18 DIAGNOSIS — F41.9 ANXIETY: ICD-10-CM

## 2024-06-18 DIAGNOSIS — G47.09 OTHER INSOMNIA: ICD-10-CM

## 2024-06-18 DIAGNOSIS — M16.12 ARTHRITIS OF LEFT HIP: ICD-10-CM

## 2024-06-18 DIAGNOSIS — K59.00 CONSTIPATION, UNSPECIFIED CONSTIPATION TYPE: Primary | ICD-10-CM

## 2024-06-18 DIAGNOSIS — K21.9 GASTROESOPHAGEAL REFLUX DISEASE WITHOUT ESOPHAGITIS: ICD-10-CM

## 2024-06-18 LAB
ERYTHROCYTE [DISTWIDTH] IN BLOOD BY AUTOMATED COUNT: 13.8 % (ref 11.5–14.5)
HCT VFR BLD AUTO: 46 % (ref 36–46)
HGB BLD-MCNC: 14.4 G/DL (ref 12–16)
IRON SATN MFR SERPL: 20 % (ref 25–45)
IRON SERPL-MCNC: 55 UG/DL (ref 35–150)
MCH RBC QN AUTO: 28.3 PG (ref 26–34)
MCHC RBC AUTO-ENTMCNC: 31.3 G/DL (ref 32–36)
MCV RBC AUTO: 91 FL (ref 80–100)
NRBC BLD-RTO: 0 /100 WBCS (ref 0–0)
PLATELET # BLD AUTO: 249 X10*3/UL (ref 150–450)
RBC # BLD AUTO: 5.08 X10*6/UL (ref 4–5.2)
TIBC SERPL-MCNC: 276 UG/DL (ref 240–445)
UIBC SERPL-MCNC: 221 UG/DL (ref 110–370)
WBC # BLD AUTO: 7.4 X10*3/UL (ref 4.4–11.3)

## 2024-06-18 PROCEDURE — 99214 OFFICE O/P EST MOD 30 MIN: CPT

## 2024-06-18 PROCEDURE — 3074F SYST BP LT 130 MM HG: CPT

## 2024-06-18 PROCEDURE — 36415 COLL VENOUS BLD VENIPUNCTURE: CPT

## 2024-06-18 PROCEDURE — 83550 IRON BINDING TEST: CPT

## 2024-06-18 PROCEDURE — 85027 COMPLETE CBC AUTOMATED: CPT

## 2024-06-18 PROCEDURE — 3079F DIAST BP 80-89 MM HG: CPT

## 2024-06-18 PROCEDURE — 1159F MED LIST DOCD IN RCRD: CPT

## 2024-06-18 PROCEDURE — 83540 ASSAY OF IRON: CPT

## 2024-06-18 RX ORDER — ROPINIROLE 4 MG/1
4 TABLET, FILM COATED ORAL 2 TIMES DAILY
Qty: 180 TABLET | Refills: 0 | Status: SHIPPED | OUTPATIENT
Start: 2024-06-18 | End: 2024-09-16

## 2024-06-18 RX ORDER — DICLOFENAC SODIUM 75 MG/1
75 TABLET, DELAYED RELEASE ORAL 2 TIMES DAILY PRN
Qty: 60 TABLET | Refills: 0 | Status: SHIPPED | OUTPATIENT
Start: 2024-06-18 | End: 2024-07-18

## 2024-06-18 RX ORDER — ESOMEPRAZOLE MAGNESIUM 40 MG/1
40 CAPSULE, DELAYED RELEASE ORAL
Qty: 30 CAPSULE | Refills: 1 | Status: SHIPPED | OUTPATIENT
Start: 2024-06-18 | End: 2024-08-17

## 2024-06-18 RX ORDER — ZOLPIDEM TARTRATE 5 MG/1
5 TABLET ORAL NIGHTLY PRN
Qty: 30 TABLET | Refills: 0 | Status: SHIPPED | OUTPATIENT
Start: 2024-06-18 | End: 2024-07-18

## 2024-06-18 RX ORDER — POLYETHYLENE GLYCOL 3350 17 G/17G
17 POWDER, FOR SOLUTION ORAL DAILY
Qty: 90 PACKET | Refills: 3 | Status: SHIPPED | OUTPATIENT
Start: 2024-06-18 | End: 2025-06-18

## 2024-06-18 RX ORDER — ALPRAZOLAM 0.5 MG/1
0.5 TABLET ORAL 3 TIMES DAILY PRN
Qty: 90 TABLET | Refills: 0 | Status: SHIPPED | OUTPATIENT
Start: 2024-06-18 | End: 2024-07-18

## 2024-06-18 NOTE — PROGRESS NOTES
Subjective   Patient ID: Chantelle Rao is a 69 y.o. female who presents for Follow-up (90 day controlled- UDS and Contract up to date/Needs refills /Would like a new prescription for zofran).  Constipation  --miralax    Osteoporosis  --was on Alendronate tablet, did not tolerate due to GI side effects.  --stomach pain, constipation.    Benzodiazepines:  What is the patient's goal of therapy? Anxiety treatment  Is this being achieved with current treatment? yes    CATHERINE-7:  No data recorded    Activities of Daily Living:   Is your overall impression that this patient is benefiting (symptom reduction outweighs side effects) from benzodiazepine therapy? Yes     1. Physical Functioning: Same  2. Family Relationship: Same  3. Social Relationship: Same  4. Mood: Better  5. Sleep Patterns: Better  6. Overall Function: Better          Vitals:    06/18/24 1258   BP: 126/80   Pulse: 91   Temp: 36 °C (96.8 °F)   SpO2: 95%       Review of Systems    Objective   Physical Exam    Assessment/Plan   Problem List Items Addressed This Visit       Anxiety    Relevant Medications    ALPRAZolam (Xanax) 0.5 mg tablet    Esophageal reflux    Relevant Medications    esomeprazole (NexIUM) 40 mg DR capsule    Insomnia    Relevant Medications    zolpidem (Ambien) 5 mg tablet    RLS (restless legs syndrome)    Relevant Medications    rOPINIRole (Requip) 4 mg tablet     Other Visit Diagnoses       Constipation, unspecified constipation type    -  Primary    Relevant Medications    polyethylene glycol (Glycolax, Miralax) 17 gram packet    Arthritis of left hip        Relevant Medications    diclofenac (Voltaren) 75 mg EC tablet    Iron deficiency anemia, unspecified iron deficiency anemia type        Blood in stool        Relevant Orders    CBC    Iron and TIBC                 Thank you for coming in today, please call my office if you have any concerns or questions.     Antonio STACK, CNP

## 2024-06-18 NOTE — PATIENT INSTRUCTIONS
Get an over-the-counter Corn removal from the drug store  Continue xanax  Increase dose of Requip    Blood work today, occult blood check  Recheck GERD in 2 weeks  Return sooner if no better.    Thank you for coming in today, if any questions or concerns arise, please call my office.   Antonio Arambula, SEDA-CNP

## 2024-06-20 ENCOUNTER — PATIENT MESSAGE (OUTPATIENT)
Dept: PRIMARY CARE | Facility: CLINIC | Age: 69
End: 2024-06-20
Payer: MEDICARE

## 2024-06-20 DIAGNOSIS — E03.8 OTHER SPECIFIED HYPOTHYROIDISM: ICD-10-CM

## 2024-06-21 RX ORDER — LEVOTHYROXINE SODIUM 25 UG/1
25 TABLET ORAL
Qty: 90 TABLET | Refills: 0 | Status: SHIPPED | OUTPATIENT
Start: 2024-06-21 | End: 2024-09-19

## 2024-06-21 NOTE — TELEPHONE ENCOUNTER
From: Chantelle Rao  To: Antonio Arambula  Sent: 6/20/2024 10:02 PM EDT  Subject: Refill    Antonio, I need a refill on my thyroid medicine. Thank you. I've got two left

## 2024-06-24 ENCOUNTER — CLINICAL SUPPORT (OUTPATIENT)
Dept: PRIMARY CARE | Facility: CLINIC | Age: 69
End: 2024-06-24
Payer: MEDICARE

## 2024-06-24 ENCOUNTER — TELEPHONE (OUTPATIENT)
Dept: PRIMARY CARE | Facility: CLINIC | Age: 69
End: 2024-06-24

## 2024-06-24 DIAGNOSIS — D50.9 IRON DEFICIENCY ANEMIA, UNSPECIFIED IRON DEFICIENCY ANEMIA TYPE: ICD-10-CM

## 2024-06-24 DIAGNOSIS — D50.9 IRON DEFICIENCY ANEMIA, UNSPECIFIED IRON DEFICIENCY ANEMIA TYPE: Primary | ICD-10-CM

## 2024-06-24 LAB
POC FECAL OCCULT BLOOD: NEGATIVE

## 2024-06-24 PROCEDURE — 82270 OCCULT BLOOD FECES: CPT

## 2024-06-24 RX ORDER — FERROUS SULFATE 325(65) MG
1 TABLET, DELAYED RELEASE (ENTERIC COATED) ORAL 3 TIMES WEEKLY
Qty: 36 TABLET | Refills: 0 | Status: SHIPPED | OUTPATIENT
Start: 2024-06-24 | End: 2024-09-22

## 2024-06-24 NOTE — RESULT ENCOUNTER NOTE
Results are normal, please notify the patient. OK to start iron supplementation to get the iron level up

## 2024-06-24 NOTE — TELEPHONE ENCOUNTER
----- Message from NOVA Crane sent at 6/24/2024 10:48 AM EDT -----  Results are normal, please notify the patient. OK to start iron supplementation to get the iron level up

## 2024-07-08 ENCOUNTER — TELEPHONE (OUTPATIENT)
Dept: PRIMARY CARE | Facility: CLINIC | Age: 69
End: 2024-07-08
Payer: MEDICARE

## 2024-07-08 NOTE — TELEPHONE ENCOUNTER
Chantelle called requesting if there was something she could use for her stomach. Still causing a lot of pain.

## 2024-07-22 ENCOUNTER — APPOINTMENT (OUTPATIENT)
Dept: PRIMARY CARE | Facility: CLINIC | Age: 69
End: 2024-07-22
Payer: MEDICARE

## 2024-07-22 VITALS
SYSTOLIC BLOOD PRESSURE: 120 MMHG | OXYGEN SATURATION: 95 % | TEMPERATURE: 97.9 F | DIASTOLIC BLOOD PRESSURE: 90 MMHG | HEART RATE: 96 BPM

## 2024-07-22 DIAGNOSIS — L81.9 PIGMENTED SKIN LESION SUSPICIOUS FOR MALIGNANT NEOPLASM: ICD-10-CM

## 2024-07-22 DIAGNOSIS — M54.50 ACUTE BILATERAL LOW BACK PAIN WITHOUT SCIATICA: ICD-10-CM

## 2024-07-22 DIAGNOSIS — D72.829 LEUKOCYTOSIS, UNSPECIFIED TYPE: Primary | ICD-10-CM

## 2024-07-22 DIAGNOSIS — D50.9 IRON DEFICIENCY ANEMIA, UNSPECIFIED IRON DEFICIENCY ANEMIA TYPE: ICD-10-CM

## 2024-07-22 DIAGNOSIS — R11.2 NAUSEA AND VOMITING, UNSPECIFIED VOMITING TYPE: ICD-10-CM

## 2024-07-22 DIAGNOSIS — G47.09 OTHER INSOMNIA: ICD-10-CM

## 2024-07-22 DIAGNOSIS — G89.4 CHRONIC PAIN SYNDROME: ICD-10-CM

## 2024-07-22 PROCEDURE — 3074F SYST BP LT 130 MM HG: CPT

## 2024-07-22 PROCEDURE — 99214 OFFICE O/P EST MOD 30 MIN: CPT

## 2024-07-22 PROCEDURE — 3080F DIAST BP >= 90 MM HG: CPT

## 2024-07-22 RX ORDER — ZOLPIDEM TARTRATE 5 MG/1
10 TABLET ORAL NIGHTLY PRN
Qty: 30 TABLET | Refills: 0 | Status: SHIPPED | OUTPATIENT
Start: 2024-07-22 | End: 2024-08-21

## 2024-07-22 RX ORDER — ONDANSETRON 4 MG/1
4 TABLET, ORALLY DISINTEGRATING ORAL EVERY 8 HOURS PRN
Qty: 90 TABLET | Refills: 0 | Status: SHIPPED | OUTPATIENT
Start: 2024-07-22 | End: 2024-08-21

## 2024-07-22 NOTE — PATIENT INSTRUCTIONS
Hematology consult for High WBC and Low Iron  Derm referral for suspicious skin lesions  Pain manager for back pain    Thank you for coming in today, if any questions or concerns arise, please call my office.   Antonio Arambula, SEDA-CNP

## 2024-07-22 NOTE — PROGRESS NOTES
Subjective   Patient ID: Chantelle Rao is a 69 y.o. female who presents for Back Pain (Lower back pain, would like a referral ) and Follow-up (Stomach pain, has been taking iron, she reports feeling nauseous, not sure if this is related to her back pain. //She would like a referral to dermatology as well for a couple spots she would like looked at. ).  Back pain  -Pain management  --she discusses a failed surgery she had in the past  --no improvement thus far from Diclofenac or muscle relaxant therapy    Skin Lesions  --chest and Left leg  --she does use tanning bed often, high risk lesion - Derm referral    Elevated WBC and Iron deficiency  --she was referred to heme in the past but did not go  --new referral placed.        Vitals:    07/22/24 0933   BP: 120/90   Pulse: 96   Temp: 36.6 °C (97.9 °F)   SpO2: 95%       Review of Systems    Objective   Physical Exam    Assessment/Plan   Problem List Items Addressed This Visit       Chronic pain syndrome    Relevant Orders    Referral to Pain Medicine    Insomnia    Relevant Medications    zolpidem (Ambien) 5 mg tablet    Lower back pain    Relevant Orders    Referral to Pain Medicine     Other Visit Diagnoses       Leukocytosis, unspecified type    -  Primary    Relevant Orders    Referral to Hematology and Oncology    Iron deficiency anemia, unspecified iron deficiency anemia type        Relevant Orders    Referral to Hematology and Oncology    Pigmented skin lesion suspicious for malignant neoplasm        Relevant Orders    Referral to Dermatology    Nausea and vomiting, unspecified vomiting type        Relevant Medications    ondansetron ODT (Zofran-ODT) 4 mg disintegrating tablet                 Thank you for coming in today, please call my office if you have any concerns or questions.     Antonio STACK, CNP

## 2024-07-30 DIAGNOSIS — F41.9 ANXIETY: ICD-10-CM

## 2024-07-30 RX ORDER — ALPRAZOLAM 0.5 MG/1
0.5 TABLET ORAL 3 TIMES DAILY PRN
Qty: 90 TABLET | Refills: 0 | Status: SHIPPED | OUTPATIENT
Start: 2024-07-30 | End: 2024-08-29

## 2024-08-09 ENCOUNTER — PATIENT MESSAGE (OUTPATIENT)
Dept: PRIMARY CARE | Facility: CLINIC | Age: 69
End: 2024-08-09
Payer: MEDICARE

## 2024-08-09 DIAGNOSIS — G47.09 OTHER INSOMNIA: ICD-10-CM

## 2024-08-09 RX ORDER — ZOLPIDEM TARTRATE 5 MG/1
10 TABLET ORAL NIGHTLY PRN
Qty: 30 TABLET | Refills: 0 | Status: SHIPPED | OUTPATIENT
Start: 2024-08-09 | End: 2024-09-08

## 2024-08-29 DIAGNOSIS — F41.9 ANXIETY: ICD-10-CM

## 2024-08-29 RX ORDER — ALPRAZOLAM 0.5 MG/1
0.5 TABLET ORAL 3 TIMES DAILY PRN
Qty: 90 TABLET | Refills: 0 | Status: SHIPPED | OUTPATIENT
Start: 2024-08-29 | End: 2024-09-28

## 2024-09-12 DIAGNOSIS — G47.09 OTHER INSOMNIA: ICD-10-CM

## 2024-09-14 ENCOUNTER — PATIENT MESSAGE (OUTPATIENT)
Dept: PRIMARY CARE | Facility: CLINIC | Age: 69
End: 2024-09-14
Payer: MEDICARE

## 2024-09-14 DIAGNOSIS — E03.8 OTHER SPECIFIED HYPOTHYROIDISM: ICD-10-CM

## 2024-09-16 DIAGNOSIS — E03.8 OTHER SPECIFIED HYPOTHYROIDISM: ICD-10-CM

## 2024-09-16 RX ORDER — LEVOTHYROXINE SODIUM 25 UG/1
25 TABLET ORAL
Qty: 90 TABLET | Refills: 0 | Status: SHIPPED | OUTPATIENT
Start: 2024-09-16 | End: 2024-09-17

## 2024-09-16 RX ORDER — ZOLPIDEM TARTRATE 5 MG/1
10 TABLET ORAL NIGHTLY PRN
Qty: 30 TABLET | Refills: 0 | Status: SHIPPED | OUTPATIENT
Start: 2024-09-16 | End: 2024-09-17 | Stop reason: SDUPTHER

## 2024-09-17 ENCOUNTER — TELEPHONE (OUTPATIENT)
Dept: PRIMARY CARE | Facility: CLINIC | Age: 69
End: 2024-09-17
Payer: MEDICARE

## 2024-09-17 DIAGNOSIS — G47.09 OTHER INSOMNIA: ICD-10-CM

## 2024-09-17 RX ORDER — LEVOTHYROXINE SODIUM 25 UG/1
25 TABLET ORAL
Qty: 90 TABLET | Refills: 2 | Status: SHIPPED | OUTPATIENT
Start: 2024-09-17 | End: 2025-06-14

## 2024-09-17 RX ORDER — ZOLPIDEM TARTRATE 10 MG/1
10 TABLET ORAL NIGHTLY PRN
Qty: 30 TABLET | Refills: 0 | Status: SHIPPED | OUTPATIENT
Start: 2024-09-17 | End: 2024-10-17

## 2024-09-20 ENCOUNTER — APPOINTMENT (OUTPATIENT)
Dept: PAIN MEDICINE | Facility: HOSPITAL | Age: 69
End: 2024-09-20
Payer: MEDICARE

## 2024-09-20 ENCOUNTER — OFFICE VISIT (OUTPATIENT)
Dept: PAIN MEDICINE | Facility: HOSPITAL | Age: 69
End: 2024-09-20
Payer: MEDICARE

## 2024-09-20 VITALS
TEMPERATURE: 97.8 F | BODY MASS INDEX: 26.8 KG/M2 | HEIGHT: 64 IN | DIASTOLIC BLOOD PRESSURE: 78 MMHG | HEART RATE: 93 BPM | RESPIRATION RATE: 16 BRPM | OXYGEN SATURATION: 94 % | WEIGHT: 157 LBS | SYSTOLIC BLOOD PRESSURE: 130 MMHG

## 2024-09-20 DIAGNOSIS — Z79.899 MEDICATION MANAGEMENT: ICD-10-CM

## 2024-09-20 DIAGNOSIS — M79.18 MYOFASCIAL PAIN: ICD-10-CM

## 2024-09-20 DIAGNOSIS — M47.817 FACET ARTHROPATHY, LUMBOSACRAL: ICD-10-CM

## 2024-09-20 DIAGNOSIS — M54.16 CHRONIC LUMBAR RADICULOPATHY: Primary | ICD-10-CM

## 2024-09-20 DIAGNOSIS — M48.062 LUMBAR STENOSIS WITH NEUROGENIC CLAUDICATION: ICD-10-CM

## 2024-09-20 LAB
AMPHETAMINES UR QL SCN: NORMAL
BARBITURATES UR QL SCN: NORMAL
BZE UR QL SCN: NORMAL
CANNABINOIDS UR QL SCN: NORMAL
CREAT UR-MCNC: 74.8 MG/DL (ref 20–320)
PCP UR QL SCN: NORMAL

## 2024-09-20 PROCEDURE — 80307 DRUG TEST PRSMV CHEM ANLYZR: CPT | Performed by: NURSE PRACTITIONER

## 2024-09-20 PROCEDURE — 3078F DIAST BP <80 MM HG: CPT | Performed by: NURSE PRACTITIONER

## 2024-09-20 PROCEDURE — 3008F BODY MASS INDEX DOCD: CPT | Performed by: NURSE PRACTITIONER

## 2024-09-20 PROCEDURE — 99203 OFFICE O/P NEW LOW 30 MIN: CPT | Performed by: NURSE PRACTITIONER

## 2024-09-20 PROCEDURE — 1125F AMNT PAIN NOTED PAIN PRSNT: CPT | Performed by: NURSE PRACTITIONER

## 2024-09-20 PROCEDURE — 99213 OFFICE O/P EST LOW 20 MIN: CPT | Performed by: NURSE PRACTITIONER

## 2024-09-20 PROCEDURE — 1159F MED LIST DOCD IN RCRD: CPT | Performed by: NURSE PRACTITIONER

## 2024-09-20 PROCEDURE — 1160F RVW MEDS BY RX/DR IN RCRD: CPT | Performed by: NURSE PRACTITIONER

## 2024-09-20 PROCEDURE — 3075F SYST BP GE 130 - 139MM HG: CPT | Performed by: NURSE PRACTITIONER

## 2024-09-20 PROCEDURE — 1036F TOBACCO NON-USER: CPT | Performed by: NURSE PRACTITIONER

## 2024-09-20 RX ORDER — GABAPENTIN 300 MG/1
300 CAPSULE ORAL NIGHTLY
Qty: 30 CAPSULE | Refills: 1 | Status: SHIPPED | OUTPATIENT
Start: 2024-09-20

## 2024-09-20 RX ORDER — BACLOFEN 5 MG/1
5 TABLET ORAL 3 TIMES DAILY PRN
Qty: 90 TABLET | Refills: 0 | Status: SHIPPED | OUTPATIENT
Start: 2024-09-20

## 2024-09-20 ASSESSMENT — PAIN SCALES - GENERAL: PAINLEVEL: 9

## 2024-09-20 ASSESSMENT — ENCOUNTER SYMPTOMS
MYALGIAS: 1
NECK STIFFNESS: 0
EYES NEGATIVE: 1
RESPIRATORY NEGATIVE: 1
BACK PAIN: 1
NECK PAIN: 0
JOINT SWELLING: 0
CONSTITUTIONAL NEGATIVE: 1
NEUROLOGICAL NEGATIVE: 1
GASTROINTESTINAL NEGATIVE: 1
ALLERGIC/IMMUNOLOGIC NEGATIVE: 1
PSYCHIATRIC NEGATIVE: 1
ARTHRALGIAS: 1
ENDOCRINE NEGATIVE: 1
CARDIOVASCULAR NEGATIVE: 1

## 2024-09-20 NOTE — PATIENT INSTRUCTIONS
I started you on baclofen and you may take it as needed for muscle pain relief.  Do not take sedating medications together.    I started you on gabapentin and you may take 300 mg at night.  We will slowly titrate.  Do not take sedating medications together.    Continue with your prescribed medications.  Do not take sedating medications together.    Call the office for your follow-up visit

## 2024-09-20 NOTE — PROGRESS NOTES
Subjective   Chantelle Rao is a well-appearing 69 y.o. female who is here to establish care with pain management.  Patient is ambulatory.  Gait steady.  She arrives by herself.  She was referred to us by her PCP.    Patient has chronic back pain to her middle and lower back.  Back pain and leg pain has been ongoing for many years.  She rates her pain as 9 out of 10.  Pain can be constant or comes and goes depending on her activities.  She describes her pain as aching, burning, dull, pressure, sharp, shooting, tender, tightness and throbbing kind of pain.  She has numbness and tingling sensation to her legs more so at the left that has been ongoing for 2 years.  She also has heaviness.  She reports that her legs feels like it is made of lead.  She voiced that she has some weakness but denies change in balance or near falling.  She denies bowel or bladder incontinence.  She denies recent falls, injuries, inciting events.      Patient reports that she has not seen pain management in the past.  However she had injections to her back years ago.  She mention it did not work.  She also had back surgery many years ago.  Based on the MRI review, she had right L5-S1 laminectomy.  Patient also has history of right total hip that was done in 2016 by Dr. Lawrence, [facility].    Patient denies physical therapy.  She declined to have this done.  She is afraid to have physical therapist touched her back.  She declined duloxetine.  She reports she had reaction to this.    I reviewed previous notes and imaging.  She had MRI of her lumbar spine that was done on 6/22/2023 and I reviewed the results with her.  I discussed the plan of care including pharmacologic and joint interventional procedure.  I discussed epidural block.  Patient declined any injections.  She will call us for follow-up visit.  Questions were answered during this encounter.    SAIRA was done today which she scored 40.  This form was scanned.    The patient was counseled  regarding diagnostic results, instructions for management, risk factor reductions, risks and benefits of treatment options and importance of compliance with treatment.        OARRS:  Marie Stallings, APRN-CNP, DNP on 9/20/2024 11:05 AM  I have personally reviewed the OARRS report for Chantelle Rao. I have considered the risks of abuse, dependence, addiction and diversion    Review of Systems   Constitutional: Negative.    HENT: Negative.     Eyes: Negative.    Respiratory: Negative.     Cardiovascular: Negative.    Gastrointestinal: Negative.    Endocrine: Negative.    Genitourinary: Negative.    Musculoskeletal:  Positive for arthralgias, back pain and myalgias. Negative for gait problem, joint swelling, neck pain and neck stiffness.   Skin: Negative.    Allergic/Immunologic: Negative.    Neurological: Negative.    Psychiatric/Behavioral: Negative.            /78 (BP Location: Right arm, Patient Position: Sitting, BP Cuff Size: Adult)   Pulse 93   Temp 36.6 °C (97.8 °F) (Temporal)   Resp 16   Wt 71.2 kg (157 lb)   SpO2 94%  Body mass index is 26.95 kg/m².      Objective       Past Medical History  She has a past medical history of Acute frontal sinusitis, unspecified (10/30/2014), Other chronic diseases of tonsils and adenoids (06/03/2021), Other conditions influencing health status (10/12/2021), Other conditions influencing health status (04/16/2015), and Personal history of other infectious and parasitic diseases (02/27/2014).    Surgical History  Past Surgical History:   Procedure Laterality Date    CT GUIDED PERCUTANEOUS BIOPSY LUNG  7/8/2020    CT GUIDED PERCUTANEOUS BIOPSY LUNG 7/8/2020    HYSTERECTOMY  05/09/2014    Hysterectomy    LUMBAR LAMINECTOMY  05/09/2014    Laminectomy Lumbar    US GUIDED FINE PERCUTANEOUS ASPIRATION  10/15/2020    US GUIDED FINE PERCUTANEOUS ASPIRATION 10/15/2020        Social History  She reports that she has never smoked. She has never used smokeless tobacco. She reports  that she does not currently use alcohol. She reports that she does not use drugs.    Family History  Family History   Problem Relation Name Age of Onset    No Known Problems Mother      Cancer Other Family History         Allergies  Patient has no known allergies.    MEDICATIONS:    Current Outpatient Medications:     albuterol (Ventolin HFA) 90 mcg/actuation inhaler, Inhale 2 puffs every 4 hours if needed for wheezing or shortness of breath., Disp: 8 g, Rfl: 5    ALPRAZolam (Xanax) 0.5 mg tablet, Take 1 tablet (0.5 mg) by mouth 3 times a day as needed for anxiety., Disp: 90 tablet, Rfl: 0    ferrous sulfate 325 (65 Fe) MG EC tablet, Take 1 tablet by mouth 3 (three) times a week. Do not crush, chew, or split., Disp: 36 tablet, Rfl: 0    levothyroxine (Synthroid, Levoxyl) 25 mcg tablet, Take 1 tablet (25 mcg) by mouth once daily in the morning. Take before meals., Disp: 90 tablet, Rfl: 2    torsemide (Demadex) 20 mg tablet, Take 2 tablets (40 mg) by mouth 2 times a day., Disp: , Rfl:     zolpidem (Ambien) 10 mg tablet, Take 1 tablet (10 mg) by mouth as needed at bedtime for sleep., Disp: 30 tablet, Rfl: 0    baclofen (Lioresal) 5 mg tablet, Take 1 tablet (5 mg) by mouth 3 times a day as needed for muscle spasms., Disp: 90 tablet, Rfl: 0    esomeprazole (NexIUM) 40 mg DR capsule, Take 1 capsule (40 mg) by mouth once daily in the morning. Take before meals. Do not open capsule., Disp: 30 capsule, Rfl: 1    gabapentin (Neurontin) 300 mg capsule, Take 1 capsule (300 mg) by mouth once daily at bedtime., Disp: 30 capsule, Rfl: 1    polyethylene glycol (Glycolax, Miralax) 17 gram packet, Take 17 g by mouth once daily. Mix 1 cap (17g) into 8 ounces of fluid., Disp: 90 packet, Rfl: 3    rOPINIRole (Requip) 4 mg tablet, Take 1 tablet (4 mg) by mouth 2 times a day., Disp: 180 tablet, Rfl: 0    Physical Exam  Vitals and nursing note reviewed.   HENT:      Head: Normocephalic.      Nose: Nose normal.   Eyes:      Extraocular  Movements: Extraocular movements intact.      Conjunctiva/sclera: Conjunctivae normal.      Pupils: Pupils are equal, round, and reactive to light.   Cardiovascular:      Rate and Rhythm: Normal rate and regular rhythm.   Pulmonary:      Effort: Pulmonary effort is normal.      Breath sounds: Normal breath sounds.   Musculoskeletal:         General: Tenderness present. No swelling, deformity or signs of injury.      Cervical back: No rigidity or tenderness.      Right lower leg: No edema.      Left lower leg: No edema.      Comments: No neck rigidity.  Full range of motion with lateral flexion.  Negative for Spurling test bilaterally.  Negative for paraspinal tenderness at the cervical region.  Positive for paraspinal tenderness at the lumbar region bilaterally at L4-L5, L5-S1 with rotation.  With nonspecific radicular symptoms.  Positive for scar at the right lumbar spine from previous surgery at L4-L5.  Positive for bilateral SI joint pain on palpation.  Positive leg raise eliciting back pain.  BUE 4/5, BLE 4/5.   Skin:     General: Skin is warm and dry.   Neurological:      General: No focal deficit present.      Mental Status: She is alert and oriented to person, place, and time.   Psychiatric:         Mood and Affect: Mood normal.         Behavior: Behavior normal.            Pain Management Panel  More data exists         Latest Ref Rng & Units 3/18/2024 7/18/2023   Pain Management Panel   Amphetamine Screen, Urine Presumptive Negative Presumptive Negative  PRESUMPTIVE NEGATIVE    Barbiturate Screen, Urine Presumptive Negative Presumptive Negative  PRESUMPTIVE NEGATIVE    Codeine IgE <50 ng/mL <50  -   Fentanyl Screen, Urine NEGATIVE - PRESUMPTIVE NEGATIVE    Hydromorphone Urine <25 ng/mL <25  -   Methadone Screen, Urine NEGATIVE - PRESUMPTIVE NEGATIVE    Morphine  <50 ng/mL <50  -      Details                      Assessment/Plan   Problem List Items Addressed This Visit             ICD-10-CM    Chronic  lumbar radiculopathy - Primary M54.16    Relevant Medications    gabapentin (Neurontin) 300 mg capsule    Lumbar stenosis with neurogenic claudication M48.062    Relevant Medications    gabapentin (Neurontin) 300 mg capsule    Facet arthropathy, lumbosacral M47.817    Relevant Medications    gabapentin (Neurontin) 300 mg capsule    Myofascial pain M79.18    Relevant Medications    baclofen (Lioresal) 5 mg tablet     Other Visit Diagnoses         Codes    Medication management     Z79.899    Relevant Orders    Opiate/Opioid/Benzo Prescription Compliance    OOB Internal Tracking                Plan/Follow-up Instructions:    I started you on baclofen and you may take it as needed for muscle pain relief.  Do not take sedating medications together.    I started you on gabapentin and you may take 300 mg at night.  We will slowly titrate.  Do not take sedating medications together.    Continue with your prescribed medications.  Do not take sedating medications together.    Call the office for your follow-up visit      Time     Prep time on date of the patient encounter: 3 minutes  Time spent directly with patient/family/caregiver: 30 minutes  Documentation time: 2 minutes  Total time on date of patient encounter: 35 minutes      --------------  Disclaimer: This note was created using voice recognition software. It was not corrected for typographical or grammatical errors, inadvertent word insertion, or any unintended errors. Please feel free to contact me for clarification.  --------------      Marie Stallings, ISMAEL, APRN, FNP-C   Formerly [facility]  Office #: [phone]  Fax # [phone]

## 2024-09-20 NOTE — PROGRESS NOTES
Last urine drug screening date/ordered today: 09/20/24        Opioid Risk Screening:   THE OPIOID RISK TOOL (ORT)                                                                      Female                     Male     1. Family History of Substance Abuse:                              Alcohol                                                   [1]=  0                         [3]  =     Illicit Drugs                                             [2] =0                          [3]   =    Prescription Drugs                                [4]=     0                      [4]   =    2. Personal History of Substance Abuse:     Alcohol                                                    [3] =  0                        [3] =     Illegal Drugs                                           [4] =  0                         [4]  =     Prescription Drugs                                [5]=    0                        [5]   =    3. Age (If between 16 to 45):               [1]=0                           [1]   =    4. History of Preadolescent Sexual Abuse                                                                  [3]=  0                          [0]   =    5. Psychological Disease:    ADD, OCD, Bipolar, Schizophrenia    [2]= 0                           [2]   =    Depression                                          [1]=  0                           [1]   =      TOTAL Score =  0     Last opioid risk screening date/ordered today: 9/20/2024  Patient's total score is 0    Reference :  Low Score = 0 to 3  Moderate Score = 4 to 7  High Score = =8       Pain Scale Screening:   Pain Assessment and Documentation Tool (PADT)   Date of Assessment: 9/20/2024  Analgesia:   Patient reports her pain level on average during the past week is 10on a 0 - 10 scale.   Patient reports that her pain level at its worst during the past week was 10 on a 0 -10 scale.   -----------------------

## 2024-09-24 ENCOUNTER — LAB (OUTPATIENT)
Dept: LAB | Facility: LAB | Age: 69
End: 2024-09-24
Payer: MEDICARE

## 2024-09-24 ENCOUNTER — TELEPHONE (OUTPATIENT)
Dept: PAIN MEDICINE | Facility: HOSPITAL | Age: 69
End: 2024-09-24

## 2024-09-24 DIAGNOSIS — D50.9 IRON DEFICIENCY ANEMIA, UNSPECIFIED IRON DEFICIENCY ANEMIA TYPE: ICD-10-CM

## 2024-09-24 LAB
FERRITIN SERPL-MCNC: 215 NG/ML (ref 8–150)
IRON SATN MFR SERPL: 18 % (ref 25–45)
IRON SERPL-MCNC: 46 UG/DL (ref 35–150)
TIBC SERPL-MCNC: 261 UG/DL (ref 240–445)
UIBC SERPL-MCNC: 215 UG/DL (ref 110–370)

## 2024-09-24 PROCEDURE — 36415 COLL VENOUS BLD VENIPUNCTURE: CPT

## 2024-09-24 PROCEDURE — 82728 ASSAY OF FERRITIN: CPT

## 2024-09-24 PROCEDURE — 83550 IRON BINDING TEST: CPT

## 2024-09-24 PROCEDURE — 83540 ASSAY OF IRON: CPT

## 2024-09-24 NOTE — LETTER
September 25, 2024     Chantelle Rao  [address]  [facility] [address]      Dear Ms. Rao:    Below are the results from your recent visit:    Resulted Orders   Iron and TIBC   Result Value Ref Range    Iron 46 35 - 150 ug/dL    UIBC 215 110 - 370 ug/dL    TIBC 261 240 - 445 ug/dL    % Saturation 18 (L) 25 - 45 %   Ferritin   Result Value Ref Range    Ferritin 215 (H) 8 - 150 ng/mL     Iron is still borderline, continue current therapy and check 3 months.     If you have any questions or concerns, please don't hesitate to call.         Sincerely,        Antonio Arambula

## 2024-09-25 ENCOUNTER — TELEPHONE (OUTPATIENT)
Dept: PRIMARY CARE | Facility: CLINIC | Age: 69
End: 2024-09-25
Payer: MEDICARE

## 2024-09-25 LAB
1OH-MIDAZOLAM UR CFM-MCNC: <25 NG/ML
6MAM UR CFM-MCNC: <25 NG/ML
7AMINOCLONAZEPAM UR CFM-MCNC: <25 NG/ML
A-OH ALPRAZ UR CFM-MCNC: 106 NG/ML
ALPRAZ UR CFM-MCNC: 39 NG/ML
CHLORDIAZEP UR CFM-MCNC: <25 NG/ML
CLONAZEPAM UR CFM-MCNC: <25 NG/ML
CODEINE UR CFM-MCNC: <50 NG/ML
DIAZEPAM UR CFM-MCNC: <25 NG/ML
EDDP UR CFM-MCNC: <25 NG/ML
FENTANYL UR CFM-MCNC: <2.5 NG/ML
HYDROCODONE CTO UR CFM-MCNC: <25 NG/ML
HYDROMORPHONE UR CFM-MCNC: <25 NG/ML
LORAZEPAM UR CFM-MCNC: <25 NG/ML
METHADONE UR CFM-MCNC: <25 NG/ML
MIDAZOLAM UR CFM-MCNC: <25 NG/ML
MORPHINE UR CFM-MCNC: <50 NG/ML
NORDIAZEPAM UR CFM-MCNC: <25 NG/ML
NORFENTANYL UR CFM-MCNC: <2.5 NG/ML
NORHYDROCODONE UR CFM-MCNC: <25 NG/ML
NOROXYCODONE UR CFM-MCNC: <25 NG/ML
NORTRAMADOL UR-MCNC: <50 NG/ML
OXAZEPAM UR CFM-MCNC: <25 NG/ML
OXYCODONE UR CFM-MCNC: <25 NG/ML
OXYMORPHONE UR CFM-MCNC: <25 NG/ML
TEMAZEPAM UR CFM-MCNC: <25 NG/ML
TRAMADOL UR CFM-MCNC: <50 NG/ML
ZOLPIDEM UR CFM-MCNC: 89 NG/ML
ZOLPIDEM UR-MCNC: <25 NG/ML

## 2024-09-25 NOTE — TELEPHONE ENCOUNTER
----- Message from Antonio Arambula sent at 9/25/2024  8:10 AM EDT -----  Results were abnormal... iron is still borderline, continue current therapy and check 3 months.

## 2024-09-27 ENCOUNTER — HOSPITAL ENCOUNTER (OUTPATIENT)
Dept: RADIOLOGY | Facility: HOSPITAL | Age: 69
Discharge: HOME | End: 2024-09-27
Payer: MEDICARE

## 2024-09-27 VITALS — HEIGHT: 64 IN | WEIGHT: 157 LBS | BODY MASS INDEX: 26.8 KG/M2

## 2024-09-27 DIAGNOSIS — Z12.31 SCREENING MAMMOGRAM FOR BREAST CANCER: ICD-10-CM

## 2024-09-27 PROCEDURE — 77067 SCR MAMMO BI INCL CAD: CPT | Performed by: RADIOLOGY

## 2024-09-27 PROCEDURE — 77067 SCR MAMMO BI INCL CAD: CPT

## 2024-09-27 PROCEDURE — 77063 BREAST TOMOSYNTHESIS BI: CPT | Performed by: RADIOLOGY

## 2024-10-02 DIAGNOSIS — F41.9 ANXIETY: ICD-10-CM

## 2024-10-03 RX ORDER — ALPRAZOLAM 0.5 MG/1
0.5 TABLET ORAL 3 TIMES DAILY PRN
Qty: 90 TABLET | Refills: 0 | Status: SHIPPED | OUTPATIENT
Start: 2024-10-03 | End: 2024-11-02

## 2024-10-08 ENCOUNTER — OFFICE VISIT (OUTPATIENT)
Dept: HEMATOLOGY/ONCOLOGY | Facility: HOSPITAL | Age: 69
End: 2024-10-08
Payer: MEDICARE

## 2024-10-08 VITALS
WEIGHT: 159.06 LBS | BODY MASS INDEX: 27.16 KG/M2 | SYSTOLIC BLOOD PRESSURE: 116 MMHG | TEMPERATURE: 96.1 F | OXYGEN SATURATION: 91 % | RESPIRATION RATE: 16 BRPM | HEART RATE: 84 BPM | DIASTOLIC BLOOD PRESSURE: 78 MMHG | HEIGHT: 64 IN

## 2024-10-08 DIAGNOSIS — R79.89 ELEVATED FERRITIN: Primary | ICD-10-CM

## 2024-10-08 DIAGNOSIS — K76.9 LIVER DISEASE: ICD-10-CM

## 2024-10-08 DIAGNOSIS — D72.829 LEUKOCYTOSIS, UNSPECIFIED TYPE: ICD-10-CM

## 2024-10-08 DIAGNOSIS — D50.9 IRON DEFICIENCY ANEMIA, UNSPECIFIED IRON DEFICIENCY ANEMIA TYPE: ICD-10-CM

## 2024-10-08 DIAGNOSIS — C34.90 MALIGNANT NEOPLASM OF LUNG, UNSPECIFIED LATERALITY, UNSPECIFIED PART OF LUNG (MULTI): ICD-10-CM

## 2024-10-08 DIAGNOSIS — R91.8 LUNG NODULES: ICD-10-CM

## 2024-10-08 LAB
ALBUMIN SERPL BCP-MCNC: 4.7 G/DL (ref 3.4–5)
ALP SERPL-CCNC: 78 U/L (ref 33–136)
ALT SERPL W P-5'-P-CCNC: 12 U/L (ref 7–45)
ANION GAP SERPL CALC-SCNC: 14 MMOL/L (ref 10–20)
APTT PPP: 35 SECONDS (ref 27–38)
AST SERPL W P-5'-P-CCNC: 16 U/L (ref 9–39)
BASOPHILS # BLD AUTO: 0.07 X10*3/UL (ref 0–0.1)
BASOPHILS # BLD AUTO: 0.08 X10*3/UL (ref 0–0.1)
BASOPHILS NFR BLD AUTO: 0.7 %
BASOPHILS NFR BLD AUTO: 0.8 %
BILIRUB SERPL-MCNC: 0.4 MG/DL (ref 0–1.2)
BUN SERPL-MCNC: 20 MG/DL (ref 6–23)
CALCIUM SERPL-MCNC: 9.8 MG/DL (ref 8.6–10.3)
CHLORIDE SERPL-SCNC: 99 MMOL/L (ref 98–107)
CO2 SERPL-SCNC: 25 MMOL/L (ref 21–32)
CREAT SERPL-MCNC: 0.93 MG/DL (ref 0.5–1.05)
CRP SERPL-MCNC: 0.93 MG/DL
EGFRCR SERPLBLD CKD-EPI 2021: 67 ML/MIN/1.73M*2
EOSINOPHIL # BLD AUTO: 0.38 X10*3/UL (ref 0–0.7)
EOSINOPHIL # BLD AUTO: 0.42 X10*3/UL (ref 0–0.7)
EOSINOPHIL NFR BLD AUTO: 3.6 %
EOSINOPHIL NFR BLD AUTO: 3.9 %
ERYTHROCYTE [DISTWIDTH] IN BLOOD BY AUTOMATED COUNT: 13.8 % (ref 11.5–14.5)
ERYTHROCYTE [DISTWIDTH] IN BLOOD BY AUTOMATED COUNT: 13.8 % (ref 11.5–14.5)
ERYTHROCYTE [SEDIMENTATION RATE] IN BLOOD BY WESTERGREN METHOD: 45 MM/H (ref 0–30)
FERRITIN SERPL-MCNC: 197 NG/ML (ref 8–150)
FOLATE SERPL-MCNC: 22.6 NG/ML
GLUCOSE SERPL-MCNC: 85 MG/DL (ref 74–99)
HBV CORE AB SER QL: NONREACTIVE
HBV CORE IGM SER QL: NONREACTIVE
HCT VFR BLD AUTO: 46.2 % (ref 36–46)
HCT VFR BLD AUTO: 46.3 % (ref 36–46)
HCV AB SER QL: NONREACTIVE
HGB BLD-MCNC: 14.9 G/DL (ref 12–16)
HGB BLD-MCNC: 15 G/DL (ref 12–16)
HGB RETIC QN: 31 PG (ref 28–38)
HIV 1+2 AB+HIV1 P24 AG SERPL QL IA: NONREACTIVE
IGA SERPL-MCNC: 323 MG/DL (ref 70–400)
IGG SERPL-MCNC: 1140 MG/DL (ref 700–1600)
IGM SERPL-MCNC: 134 MG/DL (ref 40–230)
IMM GRANULOCYTES # BLD AUTO: 0.03 X10*3/UL (ref 0–0.7)
IMM GRANULOCYTES # BLD AUTO: 0.05 X10*3/UL (ref 0–0.7)
IMM GRANULOCYTES NFR BLD AUTO: 0.3 % (ref 0–0.9)
IMM GRANULOCYTES NFR BLD AUTO: 0.5 % (ref 0–0.9)
IMMATURE RETIC FRACTION: 10 %
INR PPP: 1.1 (ref 0.9–1.1)
IRON SATN MFR SERPL: 18 % (ref 25–45)
IRON SERPL-MCNC: 50 UG/DL (ref 35–150)
LYMPHOCYTES # BLD AUTO: 2.72 X10*3/UL (ref 1.2–4.8)
LYMPHOCYTES # BLD AUTO: 2.76 X10*3/UL (ref 1.2–4.8)
LYMPHOCYTES NFR BLD AUTO: 25.7 %
LYMPHOCYTES NFR BLD AUTO: 26 %
MCH RBC QN AUTO: 29.2 PG (ref 26–34)
MCH RBC QN AUTO: 29.2 PG (ref 26–34)
MCHC RBC AUTO-ENTMCNC: 32.2 G/DL (ref 32–36)
MCHC RBC AUTO-ENTMCNC: 32.5 G/DL (ref 32–36)
MCV RBC AUTO: 90 FL (ref 80–100)
MCV RBC AUTO: 91 FL (ref 80–100)
MONOCYTES # BLD AUTO: 0.62 X10*3/UL (ref 0.1–1)
MONOCYTES # BLD AUTO: 0.69 X10*3/UL (ref 0.1–1)
MONOCYTES NFR BLD AUTO: 5.9 %
MONOCYTES NFR BLD AUTO: 6.4 %
NEUTROPHILS # BLD AUTO: 6.65 X10*3/UL (ref 1.2–7.7)
NEUTROPHILS # BLD AUTO: 6.74 X10*3/UL (ref 1.2–7.7)
NEUTROPHILS NFR BLD AUTO: 62.8 %
NEUTROPHILS NFR BLD AUTO: 63.4 %
NRBC BLD-RTO: 0 /100 WBCS (ref 0–0)
NRBC BLD-RTO: 0 /100 WBCS (ref 0–0)
PLATELET # BLD AUTO: 268 X10*3/UL (ref 150–450)
PLATELET # BLD AUTO: 280 X10*3/UL (ref 150–450)
POTASSIUM SERPL-SCNC: 3.9 MMOL/L (ref 3.5–5.3)
PROT SERPL-MCNC: 8 G/DL (ref 6.4–8.2)
PROT SERPL-MCNC: 8.2 G/DL (ref 6.4–8.2)
PROTHROMBIN TIME: 12.4 SECONDS (ref 9.8–12.8)
RBC # BLD AUTO: 5.11 X10*6/UL (ref 4–5.2)
RBC # BLD AUTO: 5.13 X10*6/UL (ref 4–5.2)
RETICS #: 0.1 X10*6/UL (ref 0.02–0.11)
RETICS/RBC NFR AUTO: 1.9 % (ref 0.5–2)
SODIUM SERPL-SCNC: 134 MMOL/L (ref 136–145)
TIBC SERPL-MCNC: 275 UG/DL (ref 240–445)
UIBC SERPL-MCNC: 225 UG/DL (ref 110–370)
VIT B12 SERPL-MCNC: 436 PG/ML (ref 211–911)
WBC # BLD AUTO: 10.5 X10*3/UL (ref 4.4–11.3)
WBC # BLD AUTO: 10.7 X10*3/UL (ref 4.4–11.3)

## 2024-10-08 PROCEDURE — 99215 OFFICE O/P EST HI 40 MIN: CPT

## 2024-10-08 PROCEDURE — 3078F DIAST BP <80 MM HG: CPT

## 2024-10-08 PROCEDURE — 84155 ASSAY OF PROTEIN SERUM: CPT | Mod: 59,GENLAB

## 2024-10-08 PROCEDURE — 82784 ASSAY IGA/IGD/IGG/IGM EACH: CPT | Mod: GENLAB

## 2024-10-08 PROCEDURE — 99205 OFFICE O/P NEW HI 60 MIN: CPT

## 2024-10-08 PROCEDURE — 82728 ASSAY OF FERRITIN: CPT

## 2024-10-08 PROCEDURE — 86704 HEP B CORE ANTIBODY TOTAL: CPT | Mod: GENLAB

## 2024-10-08 PROCEDURE — 1160F RVW MEDS BY RX/DR IN RCRD: CPT

## 2024-10-08 PROCEDURE — 85652 RBC SED RATE AUTOMATED: CPT

## 2024-10-08 PROCEDURE — 83540 ASSAY OF IRON: CPT

## 2024-10-08 PROCEDURE — 86803 HEPATITIS C AB TEST: CPT | Mod: GENLAB

## 2024-10-08 PROCEDURE — 82746 ASSAY OF FOLIC ACID SERUM: CPT | Mod: GENLAB

## 2024-10-08 PROCEDURE — 85045 AUTOMATED RETICULOCYTE COUNT: CPT

## 2024-10-08 PROCEDURE — 85025 COMPLETE CBC W/AUTO DIFF WBC: CPT

## 2024-10-08 PROCEDURE — 3008F BODY MASS INDEX DOCD: CPT

## 2024-10-08 PROCEDURE — 85730 THROMBOPLASTIN TIME PARTIAL: CPT

## 2024-10-08 PROCEDURE — 83521 IG LIGHT CHAINS FREE EACH: CPT | Mod: GENLAB

## 2024-10-08 PROCEDURE — 82607 VITAMIN B-12: CPT | Mod: GENLAB

## 2024-10-08 PROCEDURE — 86140 C-REACTIVE PROTEIN: CPT

## 2024-10-08 PROCEDURE — 82378 CARCINOEMBRYONIC ANTIGEN: CPT | Mod: GENLAB

## 2024-10-08 PROCEDURE — 36415 COLL VENOUS BLD VENIPUNCTURE: CPT

## 2024-10-08 PROCEDURE — 87389 HIV-1 AG W/HIV-1&-2 AB AG IA: CPT | Mod: GENLAB

## 2024-10-08 PROCEDURE — 1125F AMNT PAIN NOTED PAIN PRSNT: CPT

## 2024-10-08 PROCEDURE — 1159F MED LIST DOCD IN RCRD: CPT

## 2024-10-08 PROCEDURE — 84165 PROTEIN E-PHORESIS SERUM: CPT | Mod: GENLAB

## 2024-10-08 PROCEDURE — 88185 FLOWCYTOMETRY/TC ADD-ON: CPT | Mod: TC,GENLAB

## 2024-10-08 PROCEDURE — 80053 COMPREHEN METABOLIC PANEL: CPT

## 2024-10-08 PROCEDURE — 3074F SYST BP LT 130 MM HG: CPT

## 2024-10-08 PROCEDURE — 86705 HEP B CORE ANTIBODY IGM: CPT | Mod: GENLAB

## 2024-10-08 PROCEDURE — 81450 HL NEO GSAP 5-50DNA/DNA&RNA: CPT

## 2024-10-08 RX ORDER — ONDANSETRON 4 MG/1
4 TABLET, FILM COATED ORAL EVERY 8 HOURS PRN
COMMUNITY

## 2024-10-08 ASSESSMENT — LIFESTYLE VARIABLES
AUDIT-C TOTAL SCORE: 2
HOW MANY STANDARD DRINKS CONTAINING ALCOHOL DO YOU HAVE ON A TYPICAL DAY: 3 OR 4
SKIP TO QUESTIONS 9-10: 0
HOW OFTEN DO YOU HAVE SIX OR MORE DRINKS ON ONE OCCASION: NEVER
HOW OFTEN DO YOU HAVE A DRINK CONTAINING ALCOHOL: MONTHLY OR LESS

## 2024-10-08 ASSESSMENT — COLUMBIA-SUICIDE SEVERITY RATING SCALE - C-SSRS
6. HAVE YOU EVER DONE ANYTHING, STARTED TO DO ANYTHING, OR PREPARED TO DO ANYTHING TO END YOUR LIFE?: NO
2. HAVE YOU ACTUALLY HAD ANY THOUGHTS OF KILLING YOURSELF?: NO
1. IN THE PAST MONTH, HAVE YOU WISHED YOU WERE DEAD OR WISHED YOU COULD GO TO SLEEP AND NOT WAKE UP?: NO

## 2024-10-08 ASSESSMENT — ENCOUNTER SYMPTOMS
OCCASIONAL FEELINGS OF UNSTEADINESS: 0
DEPRESSION: 0
LOSS OF SENSATION IN FEET: 1

## 2024-10-08 ASSESSMENT — PATIENT HEALTH QUESTIONNAIRE - PHQ9
2. FEELING DOWN, DEPRESSED OR HOPELESS: NOT AT ALL
1. LITTLE INTEREST OR PLEASURE IN DOING THINGS: NOT AT ALL
SUM OF ALL RESPONSES TO PHQ9 QUESTIONS 1 AND 2: 0

## 2024-10-08 ASSESSMENT — PAIN SCALES - GENERAL: PAINLEVEL: 7

## 2024-10-08 NOTE — PROGRESS NOTES
[facility]    PATIENT VISIT INFORMATION    Visit Type: New Visit    Referring Provider: NOVA Crane  Reason for referral: Leukocytosis, elevated ferritin  Primary Practice Provider: NOVA Crane    CANCER/HEMATOLGOY HISTORY    Patient is a 69-year-old female who presents for evaluation of leukocytosis, iron deficiency anemia, elevated ferritin, and low iron TSAT, referred by primary care practice provider Antonio Arambula CNP.  History of intermittent mild leukocytosis, anemia, with thrombocytopenia and low MCV.  Decreasing lymphocyte absolute.     Patient presents complaints.  Lengthy history.  Poor historian.  Following [facility] back in 2020 patient had a PET/CT showed concern for pulmonary opacities in the left and right lower lobe with concern for malignancy with moderate radiotracer uptake.  Patient states she did not have follow-up.  Patient seen pulmonary here at [place] and did not follow-up with workup.  Per records biopsy of lung was performed which showed benign necrotizing granulomatosis inflammation and mild chronic inflammation scattered yeast forms in the necrosis and correlation with culture was recommended.  SARS COVID was negative.  October 2020 cytology FNA positive for macrophages and aggregates of follicular cells in the thyroid.  ENT followed patient biopsy revealed follicular lymphoid hyperplasia and abscess.  Negative for histoplasmosis and granulocyte antibodies.  September 2021 patient had colonoscopy , finding of a tubular adenoma removed by cold snare.  Negative malignancy.     Mammogram 9/27/2024 density of bilateral breast no suspicious mass or calcifications.  CT chest without IV contrast 3/27/2024 improvement in the cluster of tree-in-bud nodules in the right lower lobe favoring reflective of an infectious or inflammatory process.  Unchanged scattered calcified nodules throughout both lungs  "consistent with calcificied granulomas.  CT abdomen pelvis with IV contrast 3/9/2024 defined area of diminished enhancement identified in the left kidney.  Suspicious for underlying polynephritis.  Mild distal colonic diverticulosis.  Subpleural nodules opacities are identified in the lower lobe which are new from 4/17/2023.  Inflammatory versus infectious versus other etiology.  Recommend follow-up.  HISTORY OF PRESENT ILLNESS     ID Statement: Chantelle Rao is a 69-year-old female    Chief Complaint: \" Do not feel well\"    Interval History:   Patient presents for evaluation for leukocytosis and elevated ferritin with low iron sat.    Patient reports multiple complaints.  Reports a metallic taste in her mouth that remains constant.  Feels sluggish about a year. Right side upper abdomen pain continues. Lower leg swelling. Bone and joint pain all over. Follows pain management pain in back. Chronic headaches. Vision changes. Followed optometrist and normal exam per patient report. Feels pressure behind her eyes.   \"Not feeling myself, a lot of fatigue.\" Do not want to get out of bed in the morning. \"My oxygen keeps dropping.\"  Reports fever comes and goes  F throughout the day and night. Drenching night sweats sometimes throughout the week, always clammy. SOB and CP comes and goes. Followed cardiology a year ago. ECHO shows EF 55-60%.   Denies neuropathy or lymphadenopathy.   Reports abd Cramps. Intermittent constipation. No blood in stool. A lot nausea and takes zofran. No issues urinating today. Recurrent bladder infections. Overall unwell lately. Bruises easy. No abnormal bleeding.  Appetite ok, drinks fluids daily to remain hydrated, and weight loss over the last year. Roughly 20-30 pounds.   Started smoking a year ago more since family stress. Reports she never was a constant smoker.    PAST/CURRENT HISTORY     MEDICAL/SURGICAL HISTORY  -Leukocytosis  -Elevated ferritin  -History of lung nodules with concern " for malignancy back in  no follow-up  -Elevated LFTs  -Liver density noted on CT abdomen   -Hypertension  -Dyslipidemia  -Hypothyroidism  -Pharyngeal space mass  -B12 deficiency, vitamin D deficiency  -Oropharyngeal dysphagia  -GERD  -Abnormal mammogram  -Recurrent UTIs  -Unexplained fevers and drenching night sweats  -Post laminectomy syndrome  -Joint pain  -Chronic pain and lumbar degeneration, radiculopathy  -Lumbosacral facet arthropathy  -Difficulty breathing, COPD  -cough  -Normal CT and PET scan history  -Fatigue and loss of energy   -Unexplained weight loss  -Lower leg swelling  -Left ventricle systolic function EF of 50-60 mild aortic valve regurgitation and mild mitral and tricuspid regurgitation on echocardiogram 2023    SOCIAL HISTORY  -Lives alone (three children, lost one at the age of 12 years old accident, other two primarily healthy)  -Work place: Retired (Nursing coordinator)   -Tobacco/smokeless use: Pack per week cigarettes. Hx off and on smoker. Started a year ago.   -Alcohol: Occasional glass of wine monthly   -Illicit drug or marijuana use: Denies   -Episcopal or Spiritual beliefs: Methodist   -Social Determinates of Health Concerns: None reported     FAMILY HISTORY  -Mom living 97 years old ill now, hx breast cancer mastectomy   -Dad  83 year old brain cancer with spread  -one sister breast cancer living  -one sister rectal cancer living   -Paternal uncles and aunts all  from various types of cancer  -Maternal Aunts all  from cancer (colon, stomach, breast, brain and lung)  lat 60, early 70's.   No other known history of hematologic, bleeding, clotting, autoimmune, genetic, or malignant disorders in the family.     OCCUPATIONAL/ENVIRONMENTAL HISTORY/EXPOSURES:  -None reported    Active Problems, Allergy List, Medication List, and PMH/PSH/FH/Social Hx have been reviewed and reconciled in chart. Updates made when necessary.     REVIEW OF SYSTEMS   A review  "of systems has been completed and are negative for complaints except what is stated in the assessment, HPI, IH, ROS, and/or past medical history.    ALLERGIES AND MEDICATIONS     Allergies and Intolerances:   No Known Allergies   Medication Profile:   Current Outpatient Medications   Medication Instructions   • albuterol (Ventolin HFA) 90 mcg/actuation inhaler 2 puffs, inhalation, Every 4 hours PRN   • ALPRAZolam (XANAX) 0.5 mg, oral, 3 times daily PRN   • esomeprazole (NEXIUM) 40 mg, oral, Daily before breakfast, Do not open capsule.   • levothyroxine (SYNTHROID, LEVOXYL) 25 mcg, oral, Daily before breakfast   • ondansetron (ZOFRAN) 4 mg, oral, Every 8 hours PRN   • rOPINIRole (REQUIP) 4 mg, oral, 2 times daily   • torsemide (Demadex) 20 mg tablet 2 tablets, oral, 2 times daily   • zolpidem (AMBIEN) 10 mg, oral, Nightly PRN      Available Vaccination Record:   Immunization History   Administered Date(s) Administered   • Influenza, seasonal, injectable 09/26/2014      PHYSICAL EXAM     Vital Signs/Measurements:       3/18/2024     1:08 PM 4/16/2024     1:04 PM 6/18/2024    12:58 PM 7/22/2024     9:33 AM 9/20/2024    10:37 AM 9/27/2024     4:34 PM 10/8/2024     9:00 AM   Vitals   Systolic 120 113 126 120 130  116   Diastolic 80 74 80 90 78  78   Heart Rate 120 75 91 96 93  84   Temp 36.1 °C (97 °F)  36 °C (96.8 °F) 36.6 °C (97.9 °F) 36.6 °C (97.8 °F)  35.6 °C (96.1 °F)   Resp     16  16   Height (in)     1.626 m (5' 4\") 1.626 m (5' 4\") 1.626 m (5' 4\")   Weight (lb)  156 -- -- 157 157 159.06   BMI  27.2 kg/m2   26.95 kg/m2 26.95 kg/m2 27.3 kg/m2   BSA (m2)  1.78 m2   1.79 m2 1.79 m2 1.81 m2   Visit Report Report Report Report Report Report  Report      Performance:   ECOG Performance Status: 1     Grade ECOG performance status   0 Fully active, able to carry on all pre-disease performance without restriction   1 Restricted in physically strenuous activity but ambulatory and able to carry out work of a light or " sedentary nature, e.g., light housework, office work   2 Ambulatory and capable of all selfcare but unable to carry out any work activities; Up and about more than 50% of waking hours   3 Capable of only limited selfcare, confined to bed or chair more than 50% of waking hours   4 Completely disabled; Cannot carry out any selfcare; Totally confined to bed or chair   5 Dead     Physical Exam:  General: Patient is awake/alert/oriented x3, no distress, Nourished, hydrated, alert and cooperative, ambulating without difficulty  Skin: Expected color for ethnicity, good turgor, dry, no prominent lesions, rashes, unusual bruising, or bleeding   Hair: Normal texture and distribution   Nails: Normal color, no deformities    HEENT:   Head: Normocephalic, atraumatic, no visible masses, depressions, or scarring   Eyes: Conjunctiva clear, sclera non-icteric, PERRL, EOMI, no exudates or hemorrhages   Ears: nl appearance, hearing intact    Nose: no external lesions, mucosa non-inflamed, no rhinorrhea   Mouth: Mucous membranes moist, no lesions, sores, bleeding, or erythema     Head/Neck: Neck supple, no apparent injury, thyroid without mass or tenderness, No JVD, trachea midline, no bruits appreciated    Respiratory/Thorax: Patent airways, bilateral diminished worse in right lobe, chest symmetry with inspiration, normal inspiratory and expiratory effort    Cardiovascular: Regular rate and rhythm, no murmur or gallop, no carotid bruit or thrills    Gastrointestinal: Bowel sounds normal, non-distended, soft, no tenderness, no masses or hernia, or organomegaly appreciated    Genitourinary: deferred   Musculoskeletal: Normal gait, normal range of motion, no pain on palpation of spine, no deformity, normal strength for baseline, no atrophy, and no CVA tenderness appreciated   Extremities: No amputations or deformities, cyanosis, edema or viscosities, peripheral pulses intact   Neurological: Sensation present to touch, intact senses,  motor response and reflexes normal, normal strength   Breast:    Lymphatic: No significant lymphadenopathy   Psychological: Intact recent and remote memory, judgement, and insight. Appropriate mood, affect, and behavior          RESULTS/DATA     Labs:   Lab Results   Component Value Date    WBC 7.4 06/18/2024    NEUTROABS 8.14 (H) 02/12/2024    IGABSOL 0.27 02/12/2024    LYMPHSABS 2.23 02/12/2024    MONOSABS 0.89 02/12/2024    EOSABS 0.24 02/12/2024    BASOSABS 0.08 02/12/2024    RBC 5.08 06/18/2024    MCV 91 06/18/2024    MCHC 31.3 (L) 06/18/2024    HGB 14.4 06/18/2024    HCT 46.0 06/18/2024     06/18/2024     Lab Results   Component Value Date    CREATININE 0.90 02/12/2024    BUN 18 02/12/2024    EGFR 70 02/12/2024     02/12/2024    K 4.7 02/12/2024    CL 96 (L) 02/12/2024    CO2 31 02/12/2024      Lab Results   Component Value Date    ALT 41 02/12/2024    AST 22 02/12/2024    ALKPHOS 138 (H) 02/12/2024    BILITOT 0.3 02/12/2024      Lab Results   Component Value Date    TSH 3.57 02/12/2024     Lab Results   Component Value Date    TSH 3.57 02/12/2024    E7INNRX 125 07/22/2021     Lab Results   Component Value Date    IRON 46 09/24/2024    TIBC 261 09/24/2024    FERRITIN 215 (H) 09/24/2024      Lab Results   Component Value Date    FMPZZECM13 1002 (H) 09/19/2023           Radiology/Studies:   Please see above    ASSESSMENT/PLAN     Assessment and Plan:   #1.  Leukocytosis, elevated ferritin  Patient is a 69-year-old female who presents for evaluation of leukocytosis, iron deficiency anemia, elevated ferritin, and low iron TSAT, referred by primary care practice provider Antonio Arambula CNP.  History of intermittent mild leukocytosis, anemia, with thrombocytopenia and low MCV.  Decreasing absolute lymphocytes sporadic.   Previous testing and Imaging:    Patient presents complaints.  Lengthy history.  Poor historian.  Following [facility] back in 2020 patient had a PET/CT showed concern for  pulmonary opacities in the left and right lower lobe with concern for malignancy with moderate radiotracer uptake.  Patient states she did not have follow-up.  Patient seen pulmonary years later here at [place] and did not follow-up with workup.  Per records biopsy of lung was performed which showed benign necrotizing granulomatosis inflammation and mild chronic inflammation scattered yeast forms in the necrosis and correlation with culture was recommended.  SARS COVID was negative.      October 2020 cytology FNA positive for macrophages and aggregates of follicular cells in the thyroid.  ENT followed patient biopsy revealed follicular lymphoid hyperplasia and abscess.  Negative for histoplasmosis and granulocyte antibodies.    September 2021 patient had colonoscopy , finding of a tubular adenoma removed by cold snare.  Negative malignancy.  Mammogram 9/27/2024 density of bilateral breast no suspicious mass or calcifications.  CT chest without IV contrast 3/27/2024 improvement in the cluster of tree-in-bud nodules in the right lower lobe favoring reflective of an infectious or inflammatory process.  Unchanged scattered calcified nodules throughout both lungs consistent with calcificied granulomas.  CT abdomen pelvis with IV contrast 3/9/2024 defined area of diminished enhancement identified in the left kidney.  Suspicious for underlying polynephritis.  Mild distal colonic diverticulosis.  Subpleural nodules opacities are identified in the lower lobe which are new from 4/17/2023.  Inflammatory versus infectious versus other etiology.  Recommend follow-up.    Discussed workup with patient.  Ferritin has come down.  No leukocytosis noted.  Platelets are within normal range, CBC with differential unremarkable.  Kidney and liver function testing within normal range.  Slight hyponatremia at 134.  Pending Viral panels, flow cytometry, nutritional labs, coagulation screen due to reported bruising, and SPEP.  Patient in  agreement to plan of care.  Referral to pulmonary.    #2.  Iron deficiency  Iron panel shows a TSAT of 18 pretty consistent over the last 7 months.  UIBC and TIBC slightly increasing but mildly.  Ferritin down to 197 decreased significantly from prior 411.  Likely an acute phase reactant.      #3.  Constitutional symptoms  Patient has multiple symptoms.  Shortness of breath, decreasing saturation, abdominal discomfort, night sweats, fevers, weight loss, general malaise and fatigue.  Previous PET scan from 2020 concern for malignancy though biopsy at that time was negative.  I have ordered another PET/CT due to symptoms and new nodules found on CT.   Unable to explain her symptoms as her labs are fairly normal.  Additional's are pending.     **Please follow with specialties as scheduled for other comorbidities and routine health screenings.**    I have reviewed the patient's medical record including provider notes, laboratory and testing results, imaging, and procedures available within the system and outside the system pertinent to patient care.     Follow up:    RTC:  -2-3 weeks to review workup    Medications:  -NA    Imaging/Testing:  -PET CT    Referral:  -Pulmonary     Other Pertinent Appointments:  -Derm 12/9/2024      Patient Discussion Summary:  Discussed plan of care in detail. Patient states understanding and in agreement. Answered all questions. They will call with any additional questions and/or concerns.    Thank you for allowing me to participate in your care. It was a pleasure meeting you.    Sincerely,  Silvia Pascal, APRN-CNP       This document may have been written by voice recognition software.  There may be some incorrect wording, spelling and/or spelling errors or punctuation errors that were not corrected prior to committing the note to the medical record. Please request clarification if there is documentation error or clarification is needed.   Time based billing: Please see documentation  within this specific encounter.

## 2024-10-09 LAB
CEA SERPL-MCNC: 2 UG/L
KAPPA LC SERPL-MCNC: 2.31 MG/DL (ref 0.33–1.94)
KAPPA LC/LAMBDA SER: 1.24 {RATIO} (ref 0.26–1.65)
LAMBDA LC SERPL-MCNC: 1.87 MG/DL (ref 0.57–2.63)

## 2024-10-10 LAB
ALBUMIN: 4.8 G/DL (ref 3.4–5)
ALPHA 1 GLOBULIN: 0.3 G/DL (ref 0.2–0.6)
ALPHA 2 GLOBULIN: 0.8 G/DL (ref 0.4–1.1)
BETA GLOBULIN: 0.9 G/DL (ref 0.5–1.2)
GAMMA GLOBULIN: 1.2 G/DL (ref 0.5–1.4)
PATH REVIEW-SERUM PROTEIN ELECTROPHORESIS: NORMAL
PROTEIN ELECTROPHORESIS COMMENT: NORMAL

## 2024-10-11 LAB
ELECTRONICALLY SIGNED BY: NORMAL
MYELOID NGS RESULTS: NORMAL

## 2024-10-12 LAB
CELL COUNT (BLOOD): 10.7 X10*3/UL
CELL POPULATIONS: NORMAL
CYTOGENETICS/MOLECULAR TEST ORDERED: NORMAL
DIAGNOSIS: NORMAL
FLOW DIFFERENTIAL: NORMAL
FLOW TEST ORDERED: NORMAL
LAB TEST METHOD: NORMAL
NUMBER OF CELLS COLLECTED: NORMAL PER TUBE
PATH REPORT.TOTAL CANCER: NORMAL
RBC MORPH BLD: NORMAL
SIGNATURE COMMENT: NORMAL
SPECIMEN VIABILITY: NORMAL
WBC MORPH BLD: NORMAL

## 2024-10-14 DIAGNOSIS — G25.81 RLS (RESTLESS LEGS SYNDROME): ICD-10-CM

## 2024-10-14 LAB
ELECTRONICALLY SIGNED BY: NORMAL
HFE GENE MUT TESTED BLD/T: NORMAL
HFE P.C282Y BLD/T QL: NORMAL
HFE P.H63D BLD/T QL: NORMAL

## 2024-10-15 DIAGNOSIS — G25.81 RLS (RESTLESS LEGS SYNDROME): ICD-10-CM

## 2024-10-15 RX ORDER — ROPINIROLE 2 MG/1
2 TABLET, FILM COATED ORAL 2 TIMES DAILY
Qty: 60 TABLET | Refills: 0 | Status: SHIPPED | OUTPATIENT
Start: 2024-10-15 | End: 2024-10-17 | Stop reason: WASHOUT

## 2024-10-17 DIAGNOSIS — G25.81 RLS (RESTLESS LEGS SYNDROME): ICD-10-CM

## 2024-10-17 RX ORDER — ROPINIROLE 4 MG/1
4 TABLET, FILM COATED ORAL 2 TIMES DAILY
Qty: 180 TABLET | Refills: 0 | Status: SHIPPED | OUTPATIENT
Start: 2024-10-17 | End: 2025-01-15

## 2024-10-17 RX ORDER — ROPINIROLE 4 MG/1
4 TABLET, FILM COATED ORAL 2 TIMES DAILY
Qty: 180 TABLET | Refills: 0 | OUTPATIENT
Start: 2024-10-17

## 2024-10-29 DIAGNOSIS — R11.2 NAUSEA AND VOMITING, UNSPECIFIED VOMITING TYPE: ICD-10-CM

## 2024-10-30 ENCOUNTER — HOSPITAL ENCOUNTER (OUTPATIENT)
Dept: RADIOLOGY | Facility: HOSPITAL | Age: 69
Discharge: HOME | End: 2024-10-30
Payer: MEDICARE

## 2024-10-30 DIAGNOSIS — R79.89 ELEVATED FERRITIN: ICD-10-CM

## 2024-10-30 DIAGNOSIS — D72.829 LEUKOCYTOSIS, UNSPECIFIED TYPE: ICD-10-CM

## 2024-10-30 DIAGNOSIS — R91.8 LUNG NODULES: ICD-10-CM

## 2024-10-30 DIAGNOSIS — C34.90 MALIGNANT NEOPLASM OF LUNG, UNSPECIFIED LATERALITY, UNSPECIFIED PART OF LUNG (MULTI): ICD-10-CM

## 2024-10-30 DIAGNOSIS — K76.9 LIVER DISEASE: ICD-10-CM

## 2024-10-30 PROCEDURE — 3430000001 HC RX 343 DIAGNOSTIC RADIOPHARMACEUTICALS

## 2024-10-30 PROCEDURE — A9552 F18 FDG: HCPCS

## 2024-10-30 PROCEDURE — 78816 PET IMAGE W/CT FULL BODY: CPT | Mod: PI

## 2024-10-30 RX ORDER — ONDANSETRON 4 MG/1
4 TABLET, ORALLY DISINTEGRATING ORAL EVERY 8 HOURS PRN
Qty: 90 TABLET | Refills: 0 | Status: SHIPPED | OUTPATIENT
Start: 2024-10-30 | End: 2024-11-29

## 2024-10-30 RX ORDER — FLUDEOXYGLUCOSE F 18 200 MCI/ML
12.8 INJECTION, SOLUTION INTRAVENOUS
Status: COMPLETED | OUTPATIENT
Start: 2024-10-30 | End: 2024-10-30

## 2024-11-04 DIAGNOSIS — R91.8 MULTIPLE LUNG NODULES: Primary | ICD-10-CM

## 2024-11-04 DIAGNOSIS — R91.8 LUNG NODULES: Primary | ICD-10-CM

## 2024-11-04 DIAGNOSIS — C34.90 MALIGNANT NEOPLASM OF LUNG, UNSPECIFIED LATERALITY, UNSPECIFIED PART OF LUNG (MULTI): ICD-10-CM

## 2024-11-04 NOTE — PROGRESS NOTES
Left voicemail to discuss PET/CT.  Referral to Dr. Larkin to facilitate pulmonary lung nodule biopsy.

## 2024-11-05 ENCOUNTER — TELEPHONE (OUTPATIENT)
Dept: HEMATOLOGY/ONCOLOGY | Facility: HOSPITAL | Age: 69
End: 2024-11-05

## 2024-11-05 ENCOUNTER — OFFICE VISIT (OUTPATIENT)
Dept: CARDIAC SURGERY | Facility: CLINIC | Age: 69
End: 2024-11-05
Payer: MEDICARE

## 2024-11-05 VITALS
DIASTOLIC BLOOD PRESSURE: 76 MMHG | HEIGHT: 64 IN | RESPIRATION RATE: 18 BRPM | OXYGEN SATURATION: 94 % | BODY MASS INDEX: 27.83 KG/M2 | WEIGHT: 163 LBS | SYSTOLIC BLOOD PRESSURE: 115 MMHG | HEART RATE: 84 BPM

## 2024-11-05 DIAGNOSIS — R91.1 LUNG NODULE: ICD-10-CM

## 2024-11-05 DIAGNOSIS — R91.8 MULTIPLE LUNG NODULES: ICD-10-CM

## 2024-11-05 PROCEDURE — 4004F PT TOBACCO SCREEN RCVD TLK: CPT | Performed by: THORACIC SURGERY (CARDIOTHORACIC VASCULAR SURGERY)

## 2024-11-05 PROCEDURE — 99215 OFFICE O/P EST HI 40 MIN: CPT | Performed by: THORACIC SURGERY (CARDIOTHORACIC VASCULAR SURGERY)

## 2024-11-05 PROCEDURE — 3008F BODY MASS INDEX DOCD: CPT | Performed by: THORACIC SURGERY (CARDIOTHORACIC VASCULAR SURGERY)

## 2024-11-05 PROCEDURE — 1125F AMNT PAIN NOTED PAIN PRSNT: CPT | Performed by: THORACIC SURGERY (CARDIOTHORACIC VASCULAR SURGERY)

## 2024-11-05 PROCEDURE — 3074F SYST BP LT 130 MM HG: CPT | Performed by: THORACIC SURGERY (CARDIOTHORACIC VASCULAR SURGERY)

## 2024-11-05 PROCEDURE — 3078F DIAST BP <80 MM HG: CPT | Performed by: THORACIC SURGERY (CARDIOTHORACIC VASCULAR SURGERY)

## 2024-11-05 PROCEDURE — 1159F MED LIST DOCD IN RCRD: CPT | Performed by: THORACIC SURGERY (CARDIOTHORACIC VASCULAR SURGERY)

## 2024-11-05 PROCEDURE — 99205 OFFICE O/P NEW HI 60 MIN: CPT | Performed by: THORACIC SURGERY (CARDIOTHORACIC VASCULAR SURGERY)

## 2024-11-05 ASSESSMENT — ENCOUNTER SYMPTOMS
CHOKING: 0
EYES NEGATIVE: 1
UNEXPECTED WEIGHT CHANGE: 0
STRIDOR: 0
ABDOMINAL PAIN: 0
OCCASIONAL FEELINGS OF UNSTEADINESS: 0
ENDOCRINE NEGATIVE: 1
DEPRESSION: 0
DIARRHEA: 0
APPETITE CHANGE: 0
CONSTIPATION: 0
CHILLS: 0
PALPITATIONS: 0
LOSS OF SENSATION IN FEET: 0
MUSCULOSKELETAL NEGATIVE: 1
NEUROLOGICAL NEGATIVE: 1
ALLERGIC/IMMUNOLOGIC NEGATIVE: 1
PSYCHIATRIC NEGATIVE: 1
SHORTNESS OF BREATH: 0
COUGH: 0
FATIGUE: 0
HEMATOLOGIC/LYMPHATIC NEGATIVE: 1
FEVER: 0
VOMITING: 0
CHEST TIGHTNESS: 0
DIAPHORESIS: 0
WHEEZING: 0
ABDOMINAL DISTENTION: 0
NAUSEA: 0

## 2024-11-05 ASSESSMENT — LIFESTYLE VARIABLES
HOW OFTEN DO YOU HAVE A DRINK CONTAINING ALCOHOL: MONTHLY OR LESS
AUDIT-C TOTAL SCORE: 1
HOW OFTEN DO YOU HAVE SIX OR MORE DRINKS ON ONE OCCASION: NEVER
SKIP TO QUESTIONS 9-10: 1
HOW MANY STANDARD DRINKS CONTAINING ALCOHOL DO YOU HAVE ON A TYPICAL DAY: 1 OR 2

## 2024-11-05 ASSESSMENT — PAIN SCALES - GENERAL: PAINLEVEL_OUTOF10: 6

## 2024-11-05 ASSESSMENT — PATIENT HEALTH QUESTIONNAIRE - PHQ9
1. LITTLE INTEREST OR PLEASURE IN DOING THINGS: NOT AT ALL
2. FEELING DOWN, DEPRESSED OR HOPELESS: NOT AT ALL
SUM OF ALL RESPONSES TO PHQ9 QUESTIONS 1 AND 2: 0

## 2024-11-05 NOTE — PROGRESS NOTES
Subjective   Patient ID: Chantelle Rao is a 69 y.o. female who presents for Consult (Lung nodules).  HPI    69-year-old female with a history of smoking of 20 packs year.  She was found to have multiple lung nodules in bilateral lungs.  There is a particular left lower lobe lung nodule on the very base of it that shows some avidity on PET scan.  But the nodule is another 1 that is calcified that I do believe is benign.  The other ones seem quite stable and not PET avid.  I had discussion with her about the possible etiologies.  I think this is something we can diagnose with a wedge resection.  However she endorses shortness of breath.  Will obtain PFTs first to assess her candidacy little better.  All discussed with her once we have this data.    Review of Systems   Constitutional:  Negative for appetite change, chills, diaphoresis, fatigue, fever and unexpected weight change.   HENT: Negative.     Eyes: Negative.    Respiratory:  Negative for cough, choking, chest tightness, shortness of breath, wheezing and stridor.    Cardiovascular:  Negative for chest pain, palpitations and leg swelling.   Gastrointestinal:  Negative for abdominal distention, abdominal pain, constipation, diarrhea, nausea and vomiting.   Endocrine: Negative.    Genitourinary: Negative.    Musculoskeletal: Negative.    Skin: Negative.    Allergic/Immunologic: Negative.    Neurological: Negative.    Hematological: Negative.    Psychiatric/Behavioral: Negative.     All other systems reviewed and are negative.      Objective   Physical Exam  Constitutional:       Appearance: Normal appearance.   HENT:      Head: Normocephalic and atraumatic.      Nose: Nose normal.      Mouth/Throat:      Mouth: Mucous membranes are moist.      Pharynx: Oropharynx is clear.   Eyes:      Extraocular Movements: Extraocular movements intact.      Conjunctiva/sclera: Conjunctivae normal.      Pupils: Pupils are equal, round, and reactive to light.   Cardiovascular:       Rate and Rhythm: Normal rate and regular rhythm.      Pulses: Normal pulses.      Heart sounds: Normal heart sounds.   Pulmonary:      Effort: Pulmonary effort is normal. No respiratory distress.      Breath sounds: Normal breath sounds. No stridor. No wheezing, rhonchi or rales.   Chest:      Chest wall: No tenderness.   Abdominal:      General: Abdomen is flat. Bowel sounds are normal.      Palpations: Abdomen is soft.   Musculoskeletal:         General: Normal range of motion.      Cervical back: Normal range of motion and neck supple.   Skin:     General: Skin is warm and dry.      Capillary Refill: Capillary refill takes less than 2 seconds.   Neurological:      General: No focal deficit present.      Mental Status: She is alert and oriented to person, place, and time.         Assessment/Plan   Diagnoses and all orders for this visit:  Multiple lung nodules  -     Referral to Thoracic Surgery  Obtain PFTs follow-up with results         Josh Leyva MD 11/05/24 1:00 PM

## 2024-11-05 NOTE — PATIENT INSTRUCTIONS
Please call [phone] to schedule your Pulmonary Function Test. Dr. Larkin will call you to discuss results.

## 2024-11-05 NOTE — TELEPHONE ENCOUNTER
Chantelle Rao would like to cancel the following appointments:     Kavitha Medina in [facility] ([phone]), 11/6/2024 11:20 AM     Comments:

## 2024-11-05 NOTE — TELEPHONE ENCOUNTER
Patient scheduled for HEM ONC NEW PATIENT VISIT with Dr. Mir on Wednesday, 11/27/24 at 1:40 PM. Received attached MyChart cancellation request. Reached out to patient to reschedule appointment. Per patient, she has another appointment tomorrow and needs to reschedule. Offered patient a sooner appointment prior to scheduled appointment. Patient declined. Rescheduled to 11/27/24 at 1:40 PM. Patient verbalized understanding and agreement regarding discussed information via verbal feedback.

## 2024-11-06 ENCOUNTER — HOSPITAL ENCOUNTER (OUTPATIENT)
Dept: RESPIRATORY THERAPY | Facility: HOSPITAL | Age: 69
Discharge: HOME | End: 2024-11-06
Payer: MEDICARE

## 2024-11-06 ENCOUNTER — APPOINTMENT (OUTPATIENT)
Dept: HEMATOLOGY/ONCOLOGY | Facility: HOSPITAL | Age: 69
End: 2024-11-06
Payer: MEDICARE

## 2024-11-06 DIAGNOSIS — R06.02 SHORTNESS OF BREATH: ICD-10-CM

## 2024-11-06 DIAGNOSIS — R91.1 LUNG NODULE: ICD-10-CM

## 2024-11-06 DIAGNOSIS — F41.9 ANXIETY: ICD-10-CM

## 2024-11-06 PROCEDURE — 94060 EVALUATION OF WHEEZING: CPT | Performed by: PEDIATRICS

## 2024-11-06 PROCEDURE — 94726 PLETHYSMOGRAPHY LUNG VOLUMES: CPT

## 2024-11-06 PROCEDURE — 94760 N-INVAS EAR/PLS OXIMETRY 1: CPT

## 2024-11-06 PROCEDURE — 94729 DIFFUSING CAPACITY: CPT | Performed by: PEDIATRICS

## 2024-11-06 PROCEDURE — 94618 PULMONARY STRESS TESTING: CPT | Performed by: PEDIATRICS

## 2024-11-06 PROCEDURE — 94618 PULMONARY STRESS TESTING: CPT

## 2024-11-06 PROCEDURE — 94729 DIFFUSING CAPACITY: CPT

## 2024-11-06 PROCEDURE — 94060 EVALUATION OF WHEEZING: CPT

## 2024-11-06 PROCEDURE — 94664 DEMO&/EVAL PT USE INHALER: CPT

## 2024-11-06 PROCEDURE — 94726 PLETHYSMOGRAPHY LUNG VOLUMES: CPT | Performed by: PEDIATRICS

## 2024-11-06 RX ORDER — ALPRAZOLAM 0.5 MG/1
0.5 TABLET ORAL 3 TIMES DAILY PRN
Qty: 90 TABLET | Refills: 0 | Status: SHIPPED | OUTPATIENT
Start: 2024-11-06 | End: 2024-12-06

## 2024-11-07 LAB
MGC ASCENT PFT - FEV1 - POST: 1.17
MGC ASCENT PFT - FEV1 - PRE: 0.94
MGC ASCENT PFT - FEV1 - PREDICTED: 2.16
MGC ASCENT PFT - FVC - POST: 1.93
MGC ASCENT PFT - FVC - PRE: 1.69
MGC ASCENT PFT - FVC - PREDICTED: 2.76

## 2024-11-09 DIAGNOSIS — G47.09 OTHER INSOMNIA: ICD-10-CM

## 2024-11-11 ENCOUNTER — TELEPHONE (OUTPATIENT)
Dept: CARDIAC SURGERY | Facility: CLINIC | Age: 69
End: 2024-11-11
Payer: MEDICARE

## 2024-11-11 RX ORDER — ZOLPIDEM TARTRATE 10 MG/1
10 TABLET ORAL NIGHTLY PRN
Qty: 30 TABLET | Refills: 0 | Status: SHIPPED | OUTPATIENT
Start: 2024-11-11 | End: 2024-12-11

## 2024-11-14 DIAGNOSIS — Z01.818 PRE-OP TESTING: ICD-10-CM

## 2024-11-14 DIAGNOSIS — R91.1 LUNG NODULE: ICD-10-CM

## 2024-11-14 DIAGNOSIS — R91.8 LUNG NODULES: Primary | ICD-10-CM

## 2024-11-14 NOTE — PROGRESS NOTES
Bronchoscopy Scheduling Request    Pre-bronchoscopy visit: New patient visit with Bronchoscopy group provider  Please schedule procedure: Next available    Cytology on-site:  Yes  Location:  [facility]  Performing physician:  Advanced diagnostic bronchoscopist  Referring physician:  Josh Cottrell MD, Antonio Arambula, APRN-CNP  Indication:  LLL nodule, prior smoking, abnormal PET/CT   Sedation / Anesthesia:  GA  Procedure:  Airway exam, TBNA, TBBx, Navigational bronchoscopy, Radial EBUS, Staging EBUS  Time:  Tier 3  Fluorscopy:   Yes  Imaging needed:  CT Miguel A - same day as procedure  Labs:  CBC, BMP  Meds:  None  Special Considerations:  None  Reviewed by:  Sarahi Peres MD

## 2024-11-20 ENCOUNTER — APPOINTMENT (OUTPATIENT)
Dept: PULMONOLOGY | Facility: CLINIC | Age: 69
End: 2024-11-20
Payer: MEDICARE

## 2024-11-20 VITALS — WEIGHT: 163 LBS | BODY MASS INDEX: 27.83 KG/M2 | HEIGHT: 64 IN

## 2024-11-20 DIAGNOSIS — R91.1 LEFT LOWER LOBE PULMONARY NODULE: ICD-10-CM

## 2024-11-20 DIAGNOSIS — Z01.811 PREOP PULMONARY/RESPIRATORY EXAM: Primary | ICD-10-CM

## 2024-11-20 PROCEDURE — 1159F MED LIST DOCD IN RCRD: CPT | Performed by: INTERNAL MEDICINE

## 2024-11-20 PROCEDURE — 4004F PT TOBACCO SCREEN RCVD TLK: CPT | Performed by: INTERNAL MEDICINE

## 2024-11-20 PROCEDURE — 99212 OFFICE O/P EST SF 10 MIN: CPT | Performed by: INTERNAL MEDICINE

## 2024-11-20 PROCEDURE — 3008F BODY MASS INDEX DOCD: CPT | Performed by: INTERNAL MEDICINE

## 2024-11-20 NOTE — PROGRESS NOTES
Subjective   Patient ID: Chantelle Rao is a 69 y.o. female who presents for Pre Bronchoscopy.  HPI  Patient was contacted this afternoon on 11/20/2024 by telephone at her house.  Patient had a PET scan done on 11/30/2024 which showed some FDG avidity in the left lower lobe lung nodule.  Patient does report history of shortness of breath with ADLs.  She does have an albuterol inhaler that she uses as needed.  She also reports having periodic cough.  No history of pneumonia or COPD.  She has had a history of some cardiac vascular disease but did not undergo any therapy for that.  Patient also has a history of 20-pack-year smoking history and then quit for about 1 year and then resumed that since 2023.  She is now smoking 5 cigarettes/day.  Her PET scan also showed bilateral pulmonary nodules many of which were calcified and exhibited minimal FDG avidity.  She also has some mildly hypermetabolic mediastinal and bilateral hilar lymph nodes.  Patient is scheduled to have a bronchoscopy on 1126 at 11 AM at the [facility] in [facility].  She is scheduled to have laboratory work and a CT scan of her chest the day before.  I answered all the patient's questions to her satisfaction.  Review of Systems  No change  Objective   Physical Exam  Not done  Assessment/Plan        1.  Preop pulmonary/respiratory exam.  2.  Lung nodule left lower lobe.    Froylan Solis,  11/20/24 1:32 PM

## 2024-11-23 ENCOUNTER — APPOINTMENT (OUTPATIENT)
Dept: RADIOLOGY | Facility: HOSPITAL | Age: 69
End: 2024-11-23
Payer: MEDICARE

## 2024-11-23 ENCOUNTER — APPOINTMENT (OUTPATIENT)
Dept: CARDIOLOGY | Facility: HOSPITAL | Age: 69
End: 2024-11-23
Payer: MEDICARE

## 2024-11-23 ENCOUNTER — HOSPITAL ENCOUNTER (INPATIENT)
Facility: HOSPITAL | Age: 69
End: 2024-11-23
Attending: EMERGENCY MEDICINE | Admitting: HOSPITALIST
Payer: MEDICARE

## 2024-11-23 DIAGNOSIS — D72.829 LEUKOCYTOSIS, UNSPECIFIED TYPE: ICD-10-CM

## 2024-11-23 DIAGNOSIS — R50.9 FEVER, UNSPECIFIED FEVER CAUSE: ICD-10-CM

## 2024-11-23 DIAGNOSIS — J18.9 ATYPICAL PNEUMONIA: ICD-10-CM

## 2024-11-23 DIAGNOSIS — R06.02 SHORTNESS OF BREATH: Primary | ICD-10-CM

## 2024-11-23 LAB
ALBUMIN SERPL BCP-MCNC: 3.9 G/DL (ref 3.4–5)
ALP SERPL-CCNC: 99 U/L (ref 33–136)
ALT SERPL W P-5'-P-CCNC: 22 U/L (ref 7–45)
ANION GAP BLDV CALCULATED.4IONS-SCNC: 12 MMOL/L (ref 10–25)
ANION GAP SERPL CALC-SCNC: 13 MMOL/L (ref 10–20)
APPEARANCE UR: CLEAR
AST SERPL W P-5'-P-CCNC: 28 U/L (ref 9–39)
BASE EXCESS BLDV CALC-SCNC: 0.3 MMOL/L (ref -2–3)
BASOPHILS # BLD AUTO: 0.08 X10*3/UL (ref 0–0.1)
BASOPHILS NFR BLD AUTO: 0.5 %
BILIRUB SERPL-MCNC: 0.5 MG/DL (ref 0–1.2)
BILIRUB UR STRIP.AUTO-MCNC: NEGATIVE MG/DL
BNP SERPL-MCNC: 56 PG/ML (ref 0–99)
BODY TEMPERATURE: ABNORMAL
BUN SERPL-MCNC: 11 MG/DL (ref 6–23)
CA-I BLDV-SCNC: 1.15 MMOL/L (ref 1.1–1.33)
CALCIUM SERPL-MCNC: 9.2 MG/DL (ref 8.6–10.3)
CARDIAC TROPONIN I PNL SERPL HS: 4 NG/L (ref 0–13)
CARDIAC TROPONIN I PNL SERPL HS: 4 NG/L (ref 0–13)
CHLORIDE BLDV-SCNC: 105 MMOL/L (ref 98–107)
CHLORIDE SERPL-SCNC: 104 MMOL/L (ref 98–107)
CO2 SERPL-SCNC: 25 MMOL/L (ref 21–32)
COLOR UR: YELLOW
CREAT SERPL-MCNC: 0.82 MG/DL (ref 0.5–1.05)
D DIMER PPP FEU-MCNC: 884 NG/ML FEU
EGFRCR SERPLBLD CKD-EPI 2021: 78 ML/MIN/1.73M*2
EOSINOPHIL # BLD AUTO: 0.19 X10*3/UL (ref 0–0.7)
EOSINOPHIL NFR BLD AUTO: 1.1 %
ERYTHROCYTE [DISTWIDTH] IN BLOOD BY AUTOMATED COUNT: 13.7 % (ref 11.5–14.5)
FLUAV RNA RESP QL NAA+PROBE: NOT DETECTED
FLUBV RNA RESP QL NAA+PROBE: NOT DETECTED
GLUCOSE BLDV-MCNC: 117 MG/DL (ref 74–99)
GLUCOSE SERPL-MCNC: 120 MG/DL (ref 74–99)
GLUCOSE UR STRIP.AUTO-MCNC: NORMAL MG/DL
HCO3 BLDV-SCNC: 23.5 MMOL/L (ref 22–26)
HCT VFR BLD AUTO: 41.1 % (ref 36–46)
HCT VFR BLD EST: 41 % (ref 36–46)
HGB BLD-MCNC: 13.4 G/DL (ref 12–16)
HGB BLDV-MCNC: 13.5 G/DL (ref 12–16)
IMM GRANULOCYTES # BLD AUTO: 0.08 X10*3/UL (ref 0–0.7)
IMM GRANULOCYTES NFR BLD AUTO: 0.5 % (ref 0–0.9)
INHALED O2 CONCENTRATION: 28 %
KETONES UR STRIP.AUTO-MCNC: ABNORMAL MG/DL
LACTATE BLDV-SCNC: 0.9 MMOL/L (ref 0.4–2)
LEUKOCYTE ESTERASE UR QL STRIP.AUTO: NEGATIVE
LYMPHOCYTES # BLD AUTO: 1.64 X10*3/UL (ref 1.2–4.8)
LYMPHOCYTES NFR BLD AUTO: 9.4 %
MCH RBC QN AUTO: 29.1 PG (ref 26–34)
MCHC RBC AUTO-ENTMCNC: 32.6 G/DL (ref 32–36)
MCV RBC AUTO: 89 FL (ref 80–100)
MONOCYTES # BLD AUTO: 1.27 X10*3/UL (ref 0.1–1)
MONOCYTES NFR BLD AUTO: 7.3 %
NEUTROPHILS # BLD AUTO: 14.2 X10*3/UL (ref 1.2–7.7)
NEUTROPHILS NFR BLD AUTO: 81.2 %
NITRITE UR QL STRIP.AUTO: NEGATIVE
NRBC BLD-RTO: 0 /100 WBCS (ref 0–0)
OXYHGB MFR BLDV: 60.9 % (ref 45–75)
PCO2 BLDV: 33 MM HG (ref 41–51)
PH BLDV: 7.46 PH (ref 7.33–7.43)
PH UR STRIP.AUTO: 7 [PH]
PLATELET # BLD AUTO: 245 X10*3/UL (ref 150–450)
PO2 BLDV: 37 MM HG (ref 35–45)
POTASSIUM BLDV-SCNC: 3.3 MMOL/L (ref 3.5–5.3)
POTASSIUM SERPL-SCNC: 3.3 MMOL/L (ref 3.5–5.3)
PROT SERPL-MCNC: 7.8 G/DL (ref 6.4–8.2)
PROT UR STRIP.AUTO-MCNC: ABNORMAL MG/DL
RBC # BLD AUTO: 4.6 X10*6/UL (ref 4–5.2)
RBC # UR STRIP.AUTO: NEGATIVE /UL
RBC #/AREA URNS AUTO: ABNORMAL /HPF
SAO2 % BLDV: 62 % (ref 45–75)
SARS-COV-2 RNA RESP QL NAA+PROBE: NOT DETECTED
SODIUM BLDV-SCNC: 137 MMOL/L (ref 136–145)
SODIUM SERPL-SCNC: 139 MMOL/L (ref 136–145)
SP GR UR STRIP.AUTO: >1.05
SQUAMOUS #/AREA URNS AUTO: ABNORMAL /HPF
UROBILINOGEN UR STRIP.AUTO-MCNC: NORMAL MG/DL
WBC # BLD AUTO: 17.5 X10*3/UL (ref 4.4–11.3)
WBC #/AREA URNS AUTO: ABNORMAL /HPF

## 2024-11-23 PROCEDURE — 94640 AIRWAY INHALATION TREATMENT: CPT

## 2024-11-23 PROCEDURE — 87636 SARSCOV2 & INF A&B AMP PRB: CPT | Performed by: EMERGENCY MEDICINE

## 2024-11-23 PROCEDURE — 2500000001 HC RX 250 WO HCPCS SELF ADMINISTERED DRUGS (ALT 637 FOR MEDICARE OP): Mod: IPSPLIT | Performed by: HOSPITALIST

## 2024-11-23 PROCEDURE — 2500000004 HC RX 250 GENERAL PHARMACY W/ HCPCS (ALT 636 FOR OP/ED): Performed by: EMERGENCY MEDICINE

## 2024-11-23 PROCEDURE — 71275 CT ANGIOGRAPHY CHEST: CPT | Performed by: RADIOLOGY

## 2024-11-23 PROCEDURE — 96375 TX/PRO/DX INJ NEW DRUG ADDON: CPT | Mod: 59

## 2024-11-23 PROCEDURE — 1100000001 HC PRIVATE ROOM DAILY: Mod: IPSPLIT

## 2024-11-23 PROCEDURE — 96374 THER/PROPH/DIAG INJ IV PUSH: CPT | Mod: 59

## 2024-11-23 PROCEDURE — 85025 COMPLETE CBC W/AUTO DIFF WBC: CPT | Performed by: EMERGENCY MEDICINE

## 2024-11-23 PROCEDURE — 81003 URINALYSIS AUTO W/O SCOPE: CPT | Mod: IPSPLIT | Performed by: EMERGENCY MEDICINE

## 2024-11-23 PROCEDURE — 2500000004 HC RX 250 GENERAL PHARMACY W/ HCPCS (ALT 636 FOR OP/ED)

## 2024-11-23 PROCEDURE — 2500000002 HC RX 250 W HCPCS SELF ADMINISTERED DRUGS (ALT 637 FOR MEDICARE OP, ALT 636 FOR OP/ED): Mod: MUE | Performed by: EMERGENCY MEDICINE

## 2024-11-23 PROCEDURE — 99285 EMERGENCY DEPT VISIT HI MDM: CPT | Mod: 25

## 2024-11-23 PROCEDURE — 83880 ASSAY OF NATRIURETIC PEPTIDE: CPT | Performed by: EMERGENCY MEDICINE

## 2024-11-23 PROCEDURE — 9420000001 HC RT PATIENT EDUCATION 5 MIN

## 2024-11-23 PROCEDURE — 2500000002 HC RX 250 W HCPCS SELF ADMINISTERED DRUGS (ALT 637 FOR MEDICARE OP, ALT 636 FOR OP/ED): Performed by: EMERGENCY MEDICINE

## 2024-11-23 PROCEDURE — 2550000001 HC RX 255 CONTRASTS: Performed by: EMERGENCY MEDICINE

## 2024-11-23 PROCEDURE — 36415 COLL VENOUS BLD VENIPUNCTURE: CPT | Performed by: EMERGENCY MEDICINE

## 2024-11-23 PROCEDURE — 9420000001 HC RT PATIENT EDUCATION 5 MIN: Mod: IPSPLIT

## 2024-11-23 PROCEDURE — 84484 ASSAY OF TROPONIN QUANT: CPT | Performed by: EMERGENCY MEDICINE

## 2024-11-23 PROCEDURE — 84132 ASSAY OF SERUM POTASSIUM: CPT | Performed by: EMERGENCY MEDICINE

## 2024-11-23 PROCEDURE — 85379 FIBRIN DEGRADATION QUANT: CPT | Performed by: EMERGENCY MEDICINE

## 2024-11-23 PROCEDURE — 71045 X-RAY EXAM CHEST 1 VIEW: CPT

## 2024-11-23 PROCEDURE — G0426 INPT/ED TELECONSULT50: HCPCS | Performed by: HOSPITALIST

## 2024-11-23 PROCEDURE — 71275 CT ANGIOGRAPHY CHEST: CPT

## 2024-11-23 PROCEDURE — 94760 N-INVAS EAR/PLS OXIMETRY 1: CPT | Mod: IPSPLIT

## 2024-11-23 PROCEDURE — 93005 ELECTROCARDIOGRAM TRACING: CPT

## 2024-11-23 PROCEDURE — 94762 N-INVAS EAR/PLS OXIMTRY CONT: CPT | Mod: CCI

## 2024-11-23 PROCEDURE — 84075 ASSAY ALKALINE PHOSPHATASE: CPT | Performed by: EMERGENCY MEDICINE

## 2024-11-23 PROCEDURE — 96376 TX/PRO/DX INJ SAME DRUG ADON: CPT | Mod: 59

## 2024-11-23 PROCEDURE — 2500000002 HC RX 250 W HCPCS SELF ADMINISTERED DRUGS (ALT 637 FOR MEDICARE OP, ALT 636 FOR OP/ED): Mod: IPSPLIT | Performed by: HOSPITALIST

## 2024-11-23 PROCEDURE — 71045 X-RAY EXAM CHEST 1 VIEW: CPT | Performed by: RADIOLOGY

## 2024-11-23 PROCEDURE — 87075 CULTR BACTERIA EXCEPT BLOOD: CPT | Mod: GENLAB | Performed by: EMERGENCY MEDICINE

## 2024-11-23 RX ORDER — ENOXAPARIN SODIUM 100 MG/ML
40 INJECTION SUBCUTANEOUS DAILY
Status: DISCONTINUED | OUTPATIENT
Start: 2024-11-24 | End: 2024-11-25 | Stop reason: HOSPADM

## 2024-11-23 RX ORDER — IPRATROPIUM BROMIDE AND ALBUTEROL SULFATE 2.5; .5 MG/3ML; MG/3ML
3 SOLUTION RESPIRATORY (INHALATION)
Status: DISCONTINUED | OUTPATIENT
Start: 2024-11-23 | End: 2024-11-23

## 2024-11-23 RX ORDER — ALBUTEROL SULFATE 0.83 MG/ML
2.5 SOLUTION RESPIRATORY (INHALATION) EVERY 2 HOUR PRN
Status: DISCONTINUED | OUTPATIENT
Start: 2024-11-23 | End: 2024-11-25 | Stop reason: HOSPADM

## 2024-11-23 RX ORDER — POLYETHYLENE GLYCOL 3350 17 G/17G
17 POWDER, FOR SOLUTION ORAL 2 TIMES DAILY PRN
Status: DISCONTINUED | OUTPATIENT
Start: 2024-11-23 | End: 2024-11-25 | Stop reason: HOSPADM

## 2024-11-23 RX ORDER — ZOLPIDEM TARTRATE 5 MG/1
10 TABLET ORAL NIGHTLY PRN
Status: DISCONTINUED | OUTPATIENT
Start: 2024-11-23 | End: 2024-11-25 | Stop reason: HOSPADM

## 2024-11-23 RX ORDER — GUAIFENESIN 100 MG/5ML
200 SOLUTION ORAL 4 TIMES DAILY PRN
Status: DISCONTINUED | OUTPATIENT
Start: 2024-11-23 | End: 2024-11-25 | Stop reason: HOSPADM

## 2024-11-23 RX ORDER — AZITHROMYCIN 250 MG/1
500 TABLET, FILM COATED ORAL
Status: DISCONTINUED | OUTPATIENT
Start: 2024-11-24 | End: 2024-11-25 | Stop reason: HOSPADM

## 2024-11-23 RX ORDER — CEFTRIAXONE 1 G/50ML
1 INJECTION, SOLUTION INTRAVENOUS EVERY 24 HOURS
Status: DISCONTINUED | OUTPATIENT
Start: 2024-11-24 | End: 2024-11-25 | Stop reason: HOSPADM

## 2024-11-23 RX ORDER — IPRATROPIUM BROMIDE AND ALBUTEROL SULFATE 2.5; .5 MG/3ML; MG/3ML
3 SOLUTION RESPIRATORY (INHALATION) ONCE
Status: COMPLETED | OUTPATIENT
Start: 2024-11-23 | End: 2024-11-23

## 2024-11-23 RX ORDER — LEVOTHYROXINE SODIUM 25 UG/1
25 TABLET ORAL
Status: DISCONTINUED | OUTPATIENT
Start: 2024-11-24 | End: 2024-11-25 | Stop reason: HOSPADM

## 2024-11-23 RX ORDER — IPRATROPIUM BROMIDE AND ALBUTEROL SULFATE 2.5; .5 MG/3ML; MG/3ML
3 SOLUTION RESPIRATORY (INHALATION)
Status: DISCONTINUED | OUTPATIENT
Start: 2024-11-24 | End: 2024-11-24

## 2024-11-23 RX ORDER — ACETAMINOPHEN 325 MG/1
650 TABLET ORAL EVERY 6 HOURS PRN
Status: DISCONTINUED | OUTPATIENT
Start: 2024-11-23 | End: 2024-11-25 | Stop reason: HOSPADM

## 2024-11-23 RX ORDER — ROPINIROLE 1 MG/1
4 TABLET, FILM COATED ORAL 2 TIMES DAILY
Status: DISCONTINUED | OUTPATIENT
Start: 2024-11-23 | End: 2024-11-25 | Stop reason: HOSPADM

## 2024-11-23 RX ORDER — ONDANSETRON HYDROCHLORIDE 2 MG/ML
4 INJECTION, SOLUTION INTRAVENOUS EVERY 4 HOURS PRN
Status: DISCONTINUED | OUTPATIENT
Start: 2024-11-23 | End: 2024-11-25 | Stop reason: HOSPADM

## 2024-11-23 RX ORDER — IPRATROPIUM BROMIDE AND ALBUTEROL SULFATE 2.5; .5 MG/3ML; MG/3ML
3 SOLUTION RESPIRATORY (INHALATION) EVERY 2 HOUR PRN
Status: DISCONTINUED | OUTPATIENT
Start: 2024-11-23 | End: 2024-11-23

## 2024-11-23 RX ORDER — IBUPROFEN 400 MG/1
400 TABLET ORAL EVERY 6 HOURS PRN
Status: DISCONTINUED | OUTPATIENT
Start: 2024-11-23 | End: 2024-11-24

## 2024-11-23 RX ORDER — KETOROLAC TROMETHAMINE 15 MG/ML
15 INJECTION, SOLUTION INTRAMUSCULAR; INTRAVENOUS ONCE
Status: COMPLETED | OUTPATIENT
Start: 2024-11-23 | End: 2024-11-23

## 2024-11-23 RX ORDER — LEVOTHYROXINE SODIUM 25 UG/1
25 TABLET ORAL NIGHTLY
Status: DISCONTINUED | OUTPATIENT
Start: 2024-11-23 | End: 2024-11-23

## 2024-11-23 RX ORDER — CEFTRIAXONE 2 G/50ML
2 INJECTION, SOLUTION INTRAVENOUS ONCE
Status: COMPLETED | OUTPATIENT
Start: 2024-11-23 | End: 2024-11-23

## 2024-11-23 RX ORDER — IBUPROFEN 200 MG
1 TABLET ORAL DAILY
Status: DISCONTINUED | OUTPATIENT
Start: 2024-11-24 | End: 2024-11-25 | Stop reason: HOSPADM

## 2024-11-23 RX ORDER — ACETAMINOPHEN 500 MG
10 TABLET ORAL DAILY PRN
Status: DISCONTINUED | OUTPATIENT
Start: 2024-11-23 | End: 2024-11-25 | Stop reason: HOSPADM

## 2024-11-23 RX ORDER — AZITHROMYCIN 100 MG/ML
INJECTION, POWDER, LYOPHILIZED, FOR SOLUTION INTRAVENOUS
Status: DISPENSED
Start: 2024-11-23 | End: 2024-11-24

## 2024-11-23 RX ORDER — ALPRAZOLAM 0.25 MG/1
0.5 TABLET ORAL 3 TIMES DAILY PRN
Status: DISCONTINUED | OUTPATIENT
Start: 2024-11-23 | End: 2024-11-25 | Stop reason: HOSPADM

## 2024-11-23 RX ORDER — POTASSIUM CHLORIDE 20 MEQ/1
20 TABLET, EXTENDED RELEASE ORAL ONCE
Status: COMPLETED | OUTPATIENT
Start: 2024-11-23 | End: 2024-11-23

## 2024-11-23 RX ORDER — KETOROLAC TROMETHAMINE 15 MG/ML
INJECTION, SOLUTION INTRAMUSCULAR; INTRAVENOUS
Status: COMPLETED
Start: 2024-11-23 | End: 2024-11-23

## 2024-11-23 RX ORDER — ALUMINUM HYDROXIDE, MAGNESIUM HYDROXIDE, AND SIMETHICONE 1200; 120; 1200 MG/30ML; MG/30ML; MG/30ML
20 SUSPENSION ORAL EVERY 4 HOURS PRN
Status: DISCONTINUED | OUTPATIENT
Start: 2024-11-23 | End: 2024-11-25 | Stop reason: HOSPADM

## 2024-11-23 RX ADMIN — ZOLPIDEM TARTRATE 10 MG: 5 TABLET ORAL at 21:55

## 2024-11-23 RX ADMIN — POTASSIUM CHLORIDE 20 MEQ: 1500 TABLET, EXTENDED RELEASE ORAL at 20:54

## 2024-11-23 RX ADMIN — ROPINIROLE HYDROCHLORIDE 4 MG: 1 TABLET, FILM COATED ORAL at 21:55

## 2024-11-23 RX ADMIN — CEFTRIAXONE 2 G: 2 INJECTION, SOLUTION INTRAVENOUS at 20:54

## 2024-11-23 RX ADMIN — IPRATROPIUM BROMIDE AND ALBUTEROL SULFATE 3 ML: .5; 3 SOLUTION RESPIRATORY (INHALATION) at 20:27

## 2024-11-23 RX ADMIN — IOHEXOL 75 ML: 350 INJECTION, SOLUTION INTRAVENOUS at 19:13

## 2024-11-23 RX ADMIN — AZITHROMYCIN 500 MG: 500 INJECTION, POWDER, LYOPHILIZED, FOR SOLUTION INTRAVENOUS at 21:55

## 2024-11-23 RX ADMIN — SODIUM CHLORIDE 500 ML: 9 INJECTION, SOLUTION INTRAVENOUS at 20:53

## 2024-11-23 RX ADMIN — KETOROLAC TROMETHAMINE 15 MG: 15 INJECTION, SOLUTION INTRAMUSCULAR; INTRAVENOUS at 18:02

## 2024-11-23 RX ADMIN — IPRATROPIUM BROMIDE AND ALBUTEROL SULFATE 3 ML: .5; 3 SOLUTION RESPIRATORY (INHALATION) at 18:02

## 2024-11-23 RX ADMIN — SODIUM CHLORIDE 1000 ML: 9 INJECTION, SOLUTION INTRAVENOUS at 19:22

## 2024-11-23 RX ADMIN — METHYLPREDNISOLONE SODIUM SUCCINATE 125 MG: 125 INJECTION, POWDER, FOR SOLUTION INTRAMUSCULAR; INTRAVENOUS at 20:53

## 2024-11-23 RX ADMIN — KETOROLAC TROMETHAMINE 15 MG: 15 INJECTION INTRAMUSCULAR; INTRAVENOUS at 18:02

## 2024-11-23 RX ADMIN — METHYLPREDNISOLONE SODIUM SUCCINATE 125 MG: 125 INJECTION, POWDER, FOR SOLUTION INTRAMUSCULAR; INTRAVENOUS at 18:02

## 2024-11-23 SDOH — SOCIAL STABILITY: SOCIAL INSECURITY: DO YOU FEEL UNSAFE GOING BACK TO THE PLACE WHERE YOU ARE LIVING?: NO

## 2024-11-23 SDOH — HEALTH STABILITY: MENTAL HEALTH
DO YOU FEEL STRESS - TENSE, RESTLESS, NERVOUS, OR ANXIOUS, OR UNABLE TO SLEEP AT NIGHT BECAUSE YOUR MIND IS TROUBLED ALL THE TIME - THESE DAYS?: ONLY A LITTLE

## 2024-11-23 SDOH — SOCIAL STABILITY: SOCIAL INSECURITY: ABUSE: ADULT

## 2024-11-23 SDOH — SOCIAL STABILITY: SOCIAL NETWORK: HOW OFTEN DO YOU ATTEND CHURCH OR RELIGIOUS SERVICES?: NEVER

## 2024-11-23 SDOH — SOCIAL STABILITY: SOCIAL INSECURITY: DOES ANYONE TRY TO KEEP YOU FROM HAVING/CONTACTING OTHER FRIENDS OR DOING THINGS OUTSIDE YOUR HOME?: NO

## 2024-11-23 SDOH — HEALTH STABILITY: PHYSICAL HEALTH
HOW OFTEN DO YOU NEED TO HAVE SOMEONE HELP YOU WHEN YOU READ INSTRUCTIONS, PAMPHLETS, OR OTHER WRITTEN MATERIAL FROM YOUR DOCTOR OR PHARMACY?: NEVER

## 2024-11-23 SDOH — SOCIAL STABILITY: SOCIAL NETWORK
DO YOU BELONG TO ANY CLUBS OR ORGANIZATIONS SUCH AS CHURCH GROUPS, UNIONS, FRATERNAL OR ATHLETIC GROUPS, OR SCHOOL GROUPS?: NO

## 2024-11-23 SDOH — SOCIAL STABILITY: SOCIAL INSECURITY: ARE THERE ANY APPARENT SIGNS OF INJURIES/BEHAVIORS THAT COULD BE RELATED TO ABUSE/NEGLECT?: NO

## 2024-11-23 SDOH — SOCIAL STABILITY: SOCIAL INSECURITY: HAVE YOU HAD ANY THOUGHTS OF HARMING ANYONE ELSE?: NO

## 2024-11-23 SDOH — SOCIAL STABILITY: SOCIAL INSECURITY: WITHIN THE LAST YEAR, HAVE YOU BEEN HUMILIATED OR EMOTIONALLY ABUSED IN OTHER WAYS BY YOUR PARTNER OR EX-PARTNER?: NO

## 2024-11-23 SDOH — ECONOMIC STABILITY: HOUSING INSECURITY: IN THE LAST 12 MONTHS, WAS THERE A TIME WHEN YOU WERE NOT ABLE TO PAY THE MORTGAGE OR RENT ON TIME?: NO

## 2024-11-23 SDOH — SOCIAL STABILITY: SOCIAL INSECURITY: WERE YOU ABLE TO COMPLETE ALL THE BEHAVIORAL HEALTH SCREENINGS?: YES

## 2024-11-23 SDOH — ECONOMIC STABILITY: FOOD INSECURITY: WITHIN THE PAST 12 MONTHS, THE FOOD YOU BOUGHT JUST DIDN'T LAST AND YOU DIDN'T HAVE MONEY TO GET MORE.: NEVER TRUE

## 2024-11-23 SDOH — SOCIAL STABILITY: SOCIAL INSECURITY
WITHIN THE LAST YEAR, HAVE YOU BEEN RAPED OR FORCED TO HAVE ANY KIND OF SEXUAL ACTIVITY BY YOUR PARTNER OR EX-PARTNER?: NO

## 2024-11-23 SDOH — ECONOMIC STABILITY: FOOD INSECURITY: HOW HARD IS IT FOR YOU TO PAY FOR THE VERY BASICS LIKE FOOD, HOUSING, MEDICAL CARE, AND HEATING?: NOT HARD AT ALL

## 2024-11-23 SDOH — SOCIAL STABILITY: SOCIAL INSECURITY: ARE YOU MARRIED, WIDOWED, DIVORCED, SEPARATED, NEVER MARRIED, OR LIVING WITH A PARTNER?: LIVING WITH PARTNER

## 2024-11-23 SDOH — SOCIAL STABILITY: SOCIAL NETWORK: HOW OFTEN DO YOU ATTEND MEETINGS OF THE CLUBS OR ORGANIZATIONS YOU BELONG TO?: NEVER

## 2024-11-23 SDOH — ECONOMIC STABILITY: INCOME INSECURITY: IN THE PAST 12 MONTHS HAS THE ELECTRIC, GAS, OIL, OR WATER COMPANY THREATENED TO SHUT OFF SERVICES IN YOUR HOME?: NO

## 2024-11-23 SDOH — SOCIAL STABILITY: SOCIAL INSECURITY: HAS ANYONE EVER THREATENED TO HURT YOUR FAMILY OR YOUR PETS?: NO

## 2024-11-23 SDOH — SOCIAL STABILITY: SOCIAL INSECURITY
WITHIN THE LAST YEAR, HAVE YOU BEEN KICKED, HIT, SLAPPED, OR OTHERWISE PHYSICALLY HURT BY YOUR PARTNER OR EX-PARTNER?: NO

## 2024-11-23 SDOH — ECONOMIC STABILITY: FOOD INSECURITY: WITHIN THE PAST 12 MONTHS, YOU WORRIED THAT YOUR FOOD WOULD RUN OUT BEFORE YOU GOT THE MONEY TO BUY MORE.: NEVER TRUE

## 2024-11-23 SDOH — SOCIAL STABILITY: SOCIAL INSECURITY: WITHIN THE LAST YEAR, HAVE YOU BEEN AFRAID OF YOUR PARTNER OR EX-PARTNER?: NO

## 2024-11-23 SDOH — SOCIAL STABILITY: SOCIAL INSECURITY: HAVE YOU HAD THOUGHTS OF HARMING ANYONE ELSE?: NO

## 2024-11-23 SDOH — HEALTH STABILITY: PHYSICAL HEALTH: ON AVERAGE, HOW MANY MINUTES DO YOU ENGAGE IN EXERCISE AT THIS LEVEL?: 0 MIN

## 2024-11-23 SDOH — SOCIAL STABILITY: SOCIAL NETWORK: IN A TYPICAL WEEK, HOW MANY TIMES DO YOU TALK ON THE PHONE WITH FAMILY, FRIENDS, OR NEIGHBORS?: ONCE A WEEK

## 2024-11-23 SDOH — SOCIAL STABILITY: SOCIAL NETWORK: HOW OFTEN DO YOU GET TOGETHER WITH FRIENDS OR RELATIVES?: ONCE A WEEK

## 2024-11-23 SDOH — HEALTH STABILITY: PHYSICAL HEALTH: ON AVERAGE, HOW MANY DAYS PER WEEK DO YOU ENGAGE IN MODERATE TO STRENUOUS EXERCISE (LIKE A BRISK WALK)?: 0 DAYS

## 2024-11-23 SDOH — SOCIAL STABILITY: SOCIAL INSECURITY: DO YOU FEEL ANYONE HAS EXPLOITED OR TAKEN ADVANTAGE OF YOU FINANCIALLY OR OF YOUR PERSONAL PROPERTY?: NO

## 2024-11-23 SDOH — SOCIAL STABILITY: SOCIAL INSECURITY: ARE YOU OR HAVE YOU BEEN THREATENED OR ABUSED PHYSICALLY, EMOTIONALLY, OR SEXUALLY BY ANYONE?: NO

## 2024-11-23 ASSESSMENT — ACTIVITIES OF DAILY LIVING (ADL)
DRESSING YOURSELF: INDEPENDENT
FEEDING YOURSELF: INDEPENDENT
ASSISTIVE_DEVICE: DENTURES PARTIAL;EYEGLASSES
ADEQUATE_TO_COMPLETE_ADL: YES
WALKS IN HOME: INDEPENDENT
BATHING: INDEPENDENT
GROOMING: INDEPENDENT
LACK_OF_TRANSPORTATION: NO
JUDGMENT_ADEQUATE_SAFELY_COMPLETE_DAILY_ACTIVITIES: YES
HEARING - LEFT EAR: FUNCTIONAL
TOILETING: INDEPENDENT
PATIENT'S MEMORY ADEQUATE TO SAFELY COMPLETE DAILY ACTIVITIES?: YES
HEARING - RIGHT EAR: FUNCTIONAL

## 2024-11-23 ASSESSMENT — PATIENT HEALTH QUESTIONNAIRE - PHQ9
SUM OF ALL RESPONSES TO PHQ9 QUESTIONS 1 & 2: 0
2. FEELING DOWN, DEPRESSED OR HOPELESS: NOT AT ALL
1. LITTLE INTEREST OR PLEASURE IN DOING THINGS: NOT AT ALL

## 2024-11-23 ASSESSMENT — COGNITIVE AND FUNCTIONAL STATUS - GENERAL
MOBILITY SCORE: 24
PATIENT BASELINE BEDBOUND: NO
DAILY ACTIVITIY SCORE: 24

## 2024-11-23 ASSESSMENT — COLUMBIA-SUICIDE SEVERITY RATING SCALE - C-SSRS
6. HAVE YOU EVER DONE ANYTHING, STARTED TO DO ANYTHING, OR PREPARED TO DO ANYTHING TO END YOUR LIFE?: NO
2. HAVE YOU ACTUALLY HAD ANY THOUGHTS OF KILLING YOURSELF?: NO
1. IN THE PAST MONTH, HAVE YOU WISHED YOU WERE DEAD OR WISHED YOU COULD GO TO SLEEP AND NOT WAKE UP?: NO
6. HAVE YOU EVER DONE ANYTHING, STARTED TO DO ANYTHING, OR PREPARED TO DO ANYTHING TO END YOUR LIFE?: NO
1. IN THE PAST MONTH, HAVE YOU WISHED YOU WERE DEAD OR WISHED YOU COULD GO TO SLEEP AND NOT WAKE UP?: NO
2. HAVE YOU ACTUALLY HAD ANY THOUGHTS OF KILLING YOURSELF?: NO

## 2024-11-23 ASSESSMENT — LIFESTYLE VARIABLES
AUDIT-C TOTAL SCORE: 1
HOW OFTEN DO YOU HAVE 6 OR MORE DRINKS ON ONE OCCASION: NEVER
AUDIT-C TOTAL SCORE: 1
SUBSTANCE_ABUSE_PAST_12_MONTHS: NO
SKIP TO QUESTIONS 9-10: 1
HOW OFTEN DO YOU HAVE A DRINK CONTAINING ALCOHOL: MONTHLY OR LESS
PRESCIPTION_ABUSE_PAST_12_MONTHS: NO
HOW MANY STANDARD DRINKS CONTAINING ALCOHOL DO YOU HAVE ON A TYPICAL DAY: 1 OR 2

## 2024-11-23 ASSESSMENT — PAIN DESCRIPTION - LOCATION
LOCATION: HEAD
LOCATION: HEAD

## 2024-11-23 ASSESSMENT — PAIN SCALES - GENERAL
PAINLEVEL_OUTOF10: 6
PAINLEVEL_OUTOF10: 9
PAINLEVEL_OUTOF10: 9

## 2024-11-23 ASSESSMENT — PAIN - FUNCTIONAL ASSESSMENT
PAIN_FUNCTIONAL_ASSESSMENT: 0-10
PAIN_FUNCTIONAL_ASSESSMENT: 0-10

## 2024-11-23 ASSESSMENT — PAIN DESCRIPTION - PAIN TYPE: TYPE: ACUTE PAIN

## 2024-11-23 NOTE — ED TRIAGE NOTES
Pt walked in chief complaint SOB and fever x1 week. Pt has orange/red productive cough, sore throat, headache, chest tightness fever of 101.7f and SOB with hx of COPD. Pt 95% RA and doesn't wear o2 at home but casn hardly get through a sentence, pt placed on 3lpm NC and bumped to 97% but is still unable to get through a sentence. Pt scheduled for surgery on Tuesday for a biopsy to get something removed from her L lower lung.

## 2024-11-23 NOTE — ED PROVIDER NOTES
[facility]  ED  Provider Note  11/23/2024  4:26 PM  AC03/AC03              Chief Complaint   Patient presents with    Shortness of Breath     Pt walked in chief complaint SOB and fever x1 week. Pt has orange/red productive cough, sore throat, headache, chest tightness fever of 101.7f and SOB with hx of COPD. Pt 95% RA and doesn't wear o2 at home but casn hardly get through a sentence, pt placed on 3lpm NC and bumped to 97% but is still unable to get through a sentence. Pt scheduled for surgery on Tuesday for a biopsy to get something removed from her L lower lung.          History of Present Illness:   Chantelle Rao is a 69 y.o. female presenting to the ED for shortness of breath, beginning more than a week ago.  The complaint has been persistent, moderate to severe in severity, and worsened by breaths.  Patient is known to have a lung mass with a PET scan showing possibility of a malignancy.  She is scheduled this coming week for a biopsy.  She is concerned she may have a blood clot.  She is not hypoxemic.  She is mildly tachycardic and has a temperature 101.7.      Review of Systems:   Pertinent positives and review of systems as noted above.  Remaining 10 review of systems is negative or noncontributory to today's episode of care.  Review of Systems       --------------------------------------------- PAST HISTORY ---------------------------------------------  Past Medical History:   Diagnosis Date    Acute frontal sinusitis, unspecified 10/30/2014    Acute frontal sinusitis    Other chronic diseases of tonsils and adenoids 06/03/2021    Tonsillar mass    Other conditions influencing health status 10/12/2021    History of cough    Other conditions influencing health status 04/16/2015    History of cough    Personal history of other infectious and parasitic diseases 02/27/2014    History of candidiasis of mouth         has a past surgical history that includes Hysterectomy (05/09/2014); Lumbar  laminectomy (05/09/2014); US guided fine percutaneous aspiration (10/15/2020); and CT guided percutaneous biopsy lung (7/8/2020).     reports that she has been smoking cigarettes. She started smoking about 22 months ago. She has a 0.5 pack-year smoking history. She has never used smokeless tobacco. She reports that she does not currently use alcohol. She reports that she does not use drugs.    family history includes Breast cancer in her mother, mother's sister, and sister; Cancer in an other family member. Unless otherwise noted, family history is non contributory    Patient's Medications   New Prescriptions    No medications on file   Previous Medications    ALBUTEROL (VENTOLIN HFA) 90 MCG/ACTUATION INHALER    Inhale 2 puffs every 4 hours if needed for wheezing or shortness of breath.    ALPRAZOLAM (XANAX) 0.5 MG TABLET    Take 1 tablet (0.5 mg) by mouth 3 times a day as needed for anxiety.    LEVOTHYROXINE (SYNTHROID, LEVOXYL) 25 MCG TABLET    Take 1 tablet (25 mcg) by mouth once daily in the morning. Take before meals.    ONDANSETRON (ZOFRAN) 4 MG TABLET    Take 1 tablet (4 mg) by mouth every 8 hours if needed for nausea or vomiting.    ONDANSETRON ODT (ZOFRAN-ODT) 4 MG DISINTEGRATING TABLET    Take 1 tablet (4 mg) by mouth every 8 hours if needed for nausea or vomiting.    ROPINIROLE (REQUIP) 4 MG TABLET    Take 1 tablet (4 mg) by mouth 2 times a day.    TORSEMIDE (DEMADEX) 20 MG TABLET    Take 2 tablets (40 mg) by mouth 2 times a day.    ZOLPIDEM (AMBIEN) 10 MG TABLET    Take 1 tablet (10 mg) by mouth as needed at bedtime for sleep.   Modified Medications    No medications on file   Discontinued Medications    No medications on file      The patient’s home medications have been reviewed.    Allergies: Patient has no known allergies.    -------------------------------------------------- RESULTS -------------------------------------------------  All laboratory and radiology results have been personally reviewed  by myself   LABS:  Labs Reviewed   CBC WITH AUTO DIFFERENTIAL - Abnormal       Result Value    WBC 17.5 (*)     nRBC 0.0      RBC 4.60      Hemoglobin 13.4      Hematocrit 41.1      MCV 89      MCH 29.1      MCHC 32.6      RDW 13.7      Platelets 245      Neutrophils % 81.2      Immature Granulocytes %, Automated 0.5      Lymphocytes % 9.4      Monocytes % 7.3      Eosinophils % 1.1      Basophils % 0.5      Neutrophils Absolute 14.20 (*)     Immature Granulocytes Absolute, Automated 0.08      Lymphocytes Absolute 1.64      Monocytes Absolute 1.27 (*)     Eosinophils Absolute 0.19      Basophils Absolute 0.08     COMPREHENSIVE METABOLIC PANEL   TROPONIN SERIES- (INITIAL, 1 HR)    Narrative:     The following orders were created for panel order Troponin I Series, High Sensitivity (0, 1 HR).  Procedure                               Abnormality         Status                     ---------                               -----------         ------                     Troponin I, High Sensiti...[[id number]]                      In process                 Troponin, High Sensitivi...[[id number]]                                                   Please view results for these tests on the individual orders.   SERIAL TROPONIN-INITIAL   SERIAL TROPONIN, 1 HOUR       EKG: Sinus tachycardia at 117 bpm, right axis deviation, no acute ST elevations.  Inferior Q waves are noted.  Interpreted by SHILOH Medina MD    RADIOLOGY:  Interpreted by Radiologist.  XR chest 1 view   Final Result   1.  No evidence of acute cardiopulmonary process. Stable chest                  MACRO:   None        Signed by: Joseph Schoenberger 11/23/2024 5:05 PM   Dictation workstation:   [id number]          ------------------------- NURSING NOTES AND VITALS REVIEWED ---------------------------   The nursing notes within the ED encounter and vital signs as below have been reviewed.   /83 (BP Location: Left arm, Patient Position: Sitting)   Pulse (!) 123    "Temp (!) 38.7 °C (101.7 °F) (Tympanic)   Resp 12   Ht 1.626 m (5' 4\")   Wt 74.8 kg (165 lb)   SpO2 95%   BMI 28.32 kg/m²   Oxygen Saturation Interpretation: Normal      ---------------------------------------------------PHYSICAL EXAM--------------------------------------  Physical Exam   Constitutional/General: Alert and oriented x3, well appearing, non toxic in NAD  Head: Normocephalic and atraumatic  Eyes: PERRL, EOMI, conjunctiva normal, sclera non icteric  Mouth: Oropharynx clear, handling secretions, no trismus, no asymmetry of the posterior oropharynx or uvular edema  Neck: Supple, full ROM, non tender to palpation in the midline, no stridor, no crepitus, no meningeal signs  Respiratory: Lungs clear to auscultation bilaterally, no wheezes, rales, or rhonchi  Cardiovascular:  Regular rate. Regular rhythm. No murmurs, gallops, or rubs. 2+ distal pulses  Chest: No chest wall tenderness  GI:  Abdomen Soft, Non tender, Non distended.  +BS. No organomegaly, no palpable masses,  No rebound, guarding, or rigidity. No CVAT   Musculoskeletal: Moves all extremities x 4. Warm and well perfused, no clubbing, cyanosis, or edema. Capillary refill <3 seconds  Integument: skin warm and dry. No rashes.   Lymphatic: no lymphadenopathy noted  Neurologic:  No focal deficits, symmetric strength 5/5 in the upper and lower extremities bilaterally  Psychiatric: Normal Affect    Procedures    ED Course as of 11/24/24 0747   Sat Nov 23, 2024 1957 A repeat EKG at 1929 interpreted by me at 1935.  Sinus tachycardia rate 104 bpm.  Axis normal.   ms QRS 90 ms  ms.  There is a lot of baseline wandering baseline artifact.  There is nonspecific ST-T change.  No evidence of acute ischemia.  No STEMI. [EC]   1958 CBC with Differential(!)  I will cell count 17.5, hemoglobin 13.4, hematocrit 41.1, platelet count 245,000, there is a slight left shift with neutrophils 14.2 [EC]   1959 Blood Gas Venous Full Panel(!)  VBG revealed a " pH 7.46, pCO2 33, potassium low 3.3, lactate normal 0.9, this was done on room air. [EC]   1959 Sars-CoV-2 PCR  Coronavirus not detected  Influenza A/B is not detected/not detected [EC]   2000 Comprehensive Metabolic Panel(!)  Comprehensive metabolic panel is grossly unremarkable except for glucose of 120, potassium minimally low at 3.3 will replace this orally. [EC]   2000 Troponin I Series, High Sensitivity (0, 1 HR)  First troponin 4, second troponin 4 [EC]   2000 D-Dimer, VTE Exclusion(!)  D-dimer is elevated at 884 and a CT of the chest has been ordered by the prior provider. [EC]   2001 Chest x-ray shows no evidence of acute cardiopulmonary process. [EC]   2002 CT of the chest  IMPRESSION:  Suboptimal low-dose imaging technique limits evaluation. No  significant central pulmonary embolism.      Left lung base mass again noted consistent with known/suspected  hypermetabolic malignancy. Correlate with patient history.   [EC]      ED Course User Index  [EC] Bryon Briones, DO         Diagnoses as of 11/24/24 0747   Shortness of breath   Atypical pneumonia   Fever, unspecified fever cause   Leukocytosis, unspecified type          Medical Decision Making:   Signed out to Dr. Briones pending results      Counseling:   The emergency provider has spoken with the patient and discussed today’s results, in addition to providing specific details for the plan of care and counseling regarding the diagnosis and prognosis.  Questions are answered at this time and they are agreeable with the plan.      --------------------------------- IMPRESSION AND DISPOSITION ---------------------------------        IMPRESSION  1. Shortness of breath    2. Atypical pneumonia    3. Fever, unspecified fever cause    4. Leukocytosis, unspecified type        DISPOSITION  Disposition: Other Disposition: Signed out to Dr. Briones pending results  Patient condition is fair      Billing Provider Critical Care Time: 0 minutes     Chris HURLEY  MD Adam  11/24/24 0748

## 2024-11-24 ENCOUNTER — APPOINTMENT (OUTPATIENT)
Dept: RADIOLOGY | Facility: HOSPITAL | Age: 69
End: 2024-11-24
Payer: MEDICARE

## 2024-11-24 VITALS
DIASTOLIC BLOOD PRESSURE: 58 MMHG | SYSTOLIC BLOOD PRESSURE: 108 MMHG | HEART RATE: 104 BPM | TEMPERATURE: 98.2 F | HEIGHT: 64 IN | OXYGEN SATURATION: 94 % | RESPIRATION RATE: 16 BRPM | BODY MASS INDEX: 27.44 KG/M2 | WEIGHT: 160.72 LBS

## 2024-11-24 PROBLEM — R50.9 FEVER: Status: ACTIVE | Noted: 2024-11-24

## 2024-11-24 PROBLEM — E87.6 HYPOKALEMIA: Status: ACTIVE | Noted: 2024-11-24

## 2024-11-24 PROBLEM — R51.9 HEADACHE: Status: ACTIVE | Noted: 2024-11-24

## 2024-11-24 PROBLEM — Z72.0 TOBACCO USE: Status: ACTIVE | Noted: 2024-11-24

## 2024-11-24 LAB
ANION GAP SERPL CALC-SCNC: 11 MMOL/L (ref 10–20)
BACTERIA BLD CULT: NORMAL
BACTERIA BLD CULT: NORMAL
BASOPHILS # BLD AUTO: 0.02 X10*3/UL (ref 0–0.1)
BASOPHILS NFR BLD AUTO: 0.1 %
BUN SERPL-MCNC: 11 MG/DL (ref 6–23)
CALCIUM SERPL-MCNC: 9.4 MG/DL (ref 8.6–10.3)
CHLORIDE SERPL-SCNC: 110 MMOL/L (ref 98–107)
CO2 SERPL-SCNC: 23 MMOL/L (ref 21–32)
CREAT SERPL-MCNC: 0.7 MG/DL (ref 0.5–1.05)
EGFRCR SERPLBLD CKD-EPI 2021: >90 ML/MIN/1.73M*2
EOSINOPHIL # BLD AUTO: 0 X10*3/UL (ref 0–0.7)
EOSINOPHIL NFR BLD AUTO: 0 %
ERYTHROCYTE [DISTWIDTH] IN BLOOD BY AUTOMATED COUNT: 13.6 % (ref 11.5–14.5)
GLUCOSE SERPL-MCNC: 148 MG/DL (ref 74–99)
GRAM STN SPEC: NORMAL
GRAM STN SPEC: NORMAL
HCT VFR BLD AUTO: 38.4 % (ref 36–46)
HGB BLD-MCNC: 12.3 G/DL (ref 12–16)
IMM GRANULOCYTES # BLD AUTO: 0.11 X10*3/UL (ref 0–0.7)
IMM GRANULOCYTES NFR BLD AUTO: 0.7 % (ref 0–0.9)
LYMPHOCYTES # BLD AUTO: 0.9 X10*3/UL (ref 1.2–4.8)
LYMPHOCYTES NFR BLD AUTO: 5.9 %
MAGNESIUM SERPL-MCNC: 2.15 MG/DL (ref 1.6–2.4)
MCH RBC QN AUTO: 28.5 PG (ref 26–34)
MCHC RBC AUTO-ENTMCNC: 32 G/DL (ref 32–36)
MCV RBC AUTO: 89 FL (ref 80–100)
MONOCYTES # BLD AUTO: 0.31 X10*3/UL (ref 0.1–1)
MONOCYTES NFR BLD AUTO: 2 %
NEUTROPHILS # BLD AUTO: 13.97 X10*3/UL (ref 1.2–7.7)
NEUTROPHILS NFR BLD AUTO: 91.3 %
NRBC BLD-RTO: 0 /100 WBCS (ref 0–0)
PLATELET # BLD AUTO: 229 X10*3/UL (ref 150–450)
POTASSIUM SERPL-SCNC: 4.1 MMOL/L (ref 3.5–5.3)
RBC # BLD AUTO: 4.31 X10*6/UL (ref 4–5.2)
SODIUM SERPL-SCNC: 140 MMOL/L (ref 136–145)
WBC # BLD AUTO: 15.3 X10*3/UL (ref 4.4–11.3)

## 2024-11-24 PROCEDURE — 80048 BASIC METABOLIC PNL TOTAL CA: CPT | Mod: IPSPLIT | Performed by: HOSPITALIST

## 2024-11-24 PROCEDURE — 36415 COLL VENOUS BLD VENIPUNCTURE: CPT | Mod: IPSPLIT | Performed by: HOSPITALIST

## 2024-11-24 PROCEDURE — 2500000001 HC RX 250 WO HCPCS SELF ADMINISTERED DRUGS (ALT 637 FOR MEDICARE OP): Mod: IPSPLIT | Performed by: HOSPITALIST

## 2024-11-24 PROCEDURE — 1100000001 HC PRIVATE ROOM DAILY: Mod: IPSPLIT

## 2024-11-24 PROCEDURE — 2500000002 HC RX 250 W HCPCS SELF ADMINISTERED DRUGS (ALT 637 FOR MEDICARE OP, ALT 636 FOR OP/ED): Mod: IPSPLIT | Performed by: HOSPITALIST

## 2024-11-24 PROCEDURE — 36415 COLL VENOUS BLD VENIPUNCTURE: CPT | Mod: IPSPLIT

## 2024-11-24 PROCEDURE — 94640 AIRWAY INHALATION TREATMENT: CPT | Mod: IPSPLIT

## 2024-11-24 PROCEDURE — 87070 CULTURE OTHR SPECIMN AEROBIC: CPT | Mod: GENLAB | Performed by: HOSPITALIST

## 2024-11-24 PROCEDURE — 2500000002 HC RX 250 W HCPCS SELF ADMINISTERED DRUGS (ALT 637 FOR MEDICARE OP, ALT 636 FOR OP/ED): Mod: IPSPLIT

## 2024-11-24 PROCEDURE — 94760 N-INVAS EAR/PLS OXIMETRY 1: CPT | Mod: IPSPLIT

## 2024-11-24 PROCEDURE — 85025 COMPLETE CBC W/AUTO DIFF WBC: CPT | Mod: IPSPLIT | Performed by: HOSPITALIST

## 2024-11-24 PROCEDURE — 2500000004 HC RX 250 GENERAL PHARMACY W/ HCPCS (ALT 636 FOR OP/ED): Mod: IPSPLIT | Performed by: HOSPITALIST

## 2024-11-24 PROCEDURE — 70450 CT HEAD/BRAIN W/O DYE: CPT | Mod: IPSPLIT

## 2024-11-24 PROCEDURE — 2500000004 HC RX 250 GENERAL PHARMACY W/ HCPCS (ALT 636 FOR OP/ED): Mod: IPSPLIT

## 2024-11-24 PROCEDURE — 70450 CT HEAD/BRAIN W/O DYE: CPT | Performed by: RADIOLOGY

## 2024-11-24 PROCEDURE — 83735 ASSAY OF MAGNESIUM: CPT | Mod: IPSPLIT

## 2024-11-24 PROCEDURE — 84145 PROCALCITONIN (PCT): CPT | Mod: GENLAB

## 2024-11-24 PROCEDURE — 94762 N-INVAS EAR/PLS OXIMTRY CONT: CPT | Mod: IPSPLIT

## 2024-11-24 PROCEDURE — 99233 SBSQ HOSP IP/OBS HIGH 50: CPT

## 2024-11-24 RX ORDER — KETOROLAC TROMETHAMINE 15 MG/ML
15 INJECTION, SOLUTION INTRAMUSCULAR; INTRAVENOUS EVERY 6 HOURS PRN
Status: DISPENSED | OUTPATIENT
Start: 2024-11-24 | End: 2024-11-25

## 2024-11-24 RX ORDER — LEVALBUTEROL INHALATION SOLUTION 0.63 MG/3ML
0.63 SOLUTION RESPIRATORY (INHALATION) 3 TIMES DAILY
Status: DISCONTINUED | OUTPATIENT
Start: 2024-11-24 | End: 2024-11-25 | Stop reason: HOSPADM

## 2024-11-24 RX ADMIN — ZOLPIDEM TARTRATE 10 MG: 5 TABLET ORAL at 20:15

## 2024-11-24 RX ADMIN — METHYLPREDNISOLONE SODIUM SUCCINATE 40 MG: 40 INJECTION, POWDER, FOR SOLUTION INTRAMUSCULAR; INTRAVENOUS at 08:09

## 2024-11-24 RX ADMIN — ROPINIROLE HYDROCHLORIDE 4 MG: 1 TABLET, FILM COATED ORAL at 08:09

## 2024-11-24 RX ADMIN — LEVOTHYROXINE SODIUM 25 MCG: 25 TABLET ORAL at 06:12

## 2024-11-24 RX ADMIN — IPRATROPIUM BROMIDE AND ALBUTEROL SULFATE 3 ML: .5; 3 SOLUTION RESPIRATORY (INHALATION) at 11:13

## 2024-11-24 RX ADMIN — ACETAMINOPHEN 650 MG: 325 TABLET, FILM COATED ORAL at 05:24

## 2024-11-24 RX ADMIN — ALPRAZOLAM 0.5 MG: 0.25 TABLET ORAL at 15:27

## 2024-11-24 RX ADMIN — AZITHROMYCIN DIHYDRATE 500 MG: 250 TABLET, FILM COATED ORAL at 08:09

## 2024-11-24 RX ADMIN — CEFTRIAXONE 1 G: 1 INJECTION, SOLUTION INTRAVENOUS at 20:15

## 2024-11-24 RX ADMIN — METHYLPREDNISOLONE SODIUM SUCCINATE 40 MG: 40 INJECTION, POWDER, FOR SOLUTION INTRAMUSCULAR; INTRAVENOUS at 17:07

## 2024-11-24 RX ADMIN — KETOROLAC TROMETHAMINE 15 MG: 15 INJECTION INTRAMUSCULAR; INTRAVENOUS at 10:59

## 2024-11-24 RX ADMIN — IPRATROPIUM BROMIDE AND ALBUTEROL SULFATE 3 ML: .5; 3 SOLUTION RESPIRATORY (INHALATION) at 15:59

## 2024-11-24 RX ADMIN — ENOXAPARIN SODIUM 40 MG: 40 INJECTION SUBCUTANEOUS at 08:09

## 2024-11-24 RX ADMIN — ROPINIROLE HYDROCHLORIDE 4 MG: 1 TABLET, FILM COATED ORAL at 20:15

## 2024-11-24 RX ADMIN — LEVALBUTEROL HYDROCHLORIDE 0.63 MG: 0.63 SOLUTION RESPIRATORY (INHALATION) at 20:36

## 2024-11-24 SDOH — ECONOMIC STABILITY: TRANSPORTATION INSECURITY: IN THE PAST 12 MONTHS, HAS LACK OF TRANSPORTATION KEPT YOU FROM MEDICAL APPOINTMENTS OR FROM GETTING MEDICATIONS?: YES

## 2024-11-24 SDOH — ECONOMIC STABILITY: TRANSPORTATION INSECURITY

## 2024-11-24 SDOH — ECONOMIC STABILITY: INCOME INSECURITY: IN THE LAST 12 MONTHS, WAS THERE A TIME WHEN YOU WERE NOT ABLE TO PAY THE MORTGAGE OR RENT ON TIME?: NO

## 2024-11-24 SDOH — ECONOMIC STABILITY: HOUSING INSECURITY

## 2024-11-24 SDOH — ECONOMIC STABILITY: GENERAL

## 2024-11-24 SDOH — ECONOMIC STABILITY: HOUSING INSECURITY: IN THE LAST 12 MONTHS, WAS THERE A TIME WHEN YOU WERE NOT ABLE TO PAY THE MORTGAGE OR RENT ON TIME?: NO

## 2024-11-24 SDOH — ECONOMIC STABILITY: TRANSPORTATION INSECURITY
IN THE PAST 12 MONTHS, HAS LACK OF TRANSPORTATION KEPT YOU FROM MEETINGS, WORK, OR FROM GETTING THINGS NEEDED FOR DAILY LIVING?: YES

## 2024-11-24 SDOH — ECONOMIC STABILITY: FOOD INSECURITY: WITHIN THE PAST 12 MONTHS, THE FOOD YOU BOUGHT JUST DIDN'T LAST AND YOU DIDN'T HAVE MONEY TO GET MORE.: SOMETIMES TRUE

## 2024-11-24 SDOH — ECONOMIC STABILITY: HOUSING INSECURITY: IN THE PAST 12 MONTHS HAS THE ELECTRIC, GAS, OIL, OR WATER COMPANY THREATENED TO SHUT OFF SERVICES IN YOUR HOME?: NO

## 2024-11-24 SDOH — ECONOMIC STABILITY: FOOD INSECURITY: WITHIN THE PAST 12 MONTHS, YOU WORRIED THAT YOUR FOOD WOULD RUN OUT BEFORE YOU GOT MONEY TO BUY MORE.: OFTEN TRUE

## 2024-11-24 SDOH — ECONOMIC STABILITY: FOOD INSECURITY: WITHIN THE PAST 12 MONTHS, YOU WORRIED THAT YOUR FOOD WOULD RUN OUT BEFORE YOU GOT THE MONEY TO BUY MORE.: OFTEN TRUE

## 2024-11-24 SDOH — ECONOMIC STABILITY: TRANSPORTATION INSECURITY
IN THE PAST 12 MONTHS, HAS THE LACK OF TRANSPORTATION KEPT YOU FROM MEDICAL APPOINTMENTS OR FROM GETTING MEDICATIONS?: YES

## 2024-11-24 SDOH — ECONOMIC STABILITY: FOOD INSECURITY

## 2024-11-24 SDOH — ECONOMIC STABILITY: HOUSING INSECURITY
IN THE LAST 12 MONTHS, WAS THERE A TIME WHEN YOU DID NOT HAVE A STEADY PLACE TO SLEEP OR SLEPT IN A SHELTER (INCLUDING NOW)?: NO

## 2024-11-24 ASSESSMENT — ACTIVITIES OF DAILY LIVING (ADL): LACK_OF_TRANSPORTATION: YES

## 2024-11-24 ASSESSMENT — COGNITIVE AND FUNCTIONAL STATUS - GENERAL
MOBILITY SCORE: 24
DAILY ACTIVITIY SCORE: 24
MOBILITY SCORE: 24
DAILY ACTIVITIY SCORE: 24

## 2024-11-24 ASSESSMENT — PAIN SCALES - GENERAL
PAINLEVEL_OUTOF10: 3
PAINLEVEL_OUTOF10: 3
PAINLEVEL_OUTOF10: 9
PAINLEVEL_OUTOF10: 9
PAINLEVEL_OUTOF10: 0 - NO PAIN

## 2024-11-24 ASSESSMENT — PAIN DESCRIPTION - LOCATION: LOCATION: HEAD

## 2024-11-24 ASSESSMENT — PAIN - FUNCTIONAL ASSESSMENT
PAIN_FUNCTIONAL_ASSESSMENT: 0-10
PAIN_FUNCTIONAL_ASSESSMENT: 0-10

## 2024-11-24 ASSESSMENT — SOCIAL DETERMINANTS OF HEALTH (SDOH): IN THE PAST 12 MONTHS, HAS THE ELECTRIC, GAS, OIL, OR WATER COMPANY THREATENED TO SHUT OFF SERVICE IN YOUR HOME?: NO

## 2024-11-24 NOTE — PROGRESS NOTES
Emergency Medicine Transition of Care Note.    I received Chantelle Rao in signout from Dr. Medina at 1900.  Please see the previous ED provider note for all HPI, PE and MDM up to the time of signout at 1900. This is in addition to the primary record.    In brief Chantelle Rao is an 69 y.o. female presenting for   Chief Complaint   Patient presents with    Shortness of Breath     Pt walked in chief complaint SOB and fever x1 week. Pt has orange/red productive cough, sore throat, headache, chest tightness fever of 101.7f and SOB with hx of COPD. Pt 95% RA and doesn't wear o2 at home but casn hardly get through a sentence, pt placed on 3lpm NC and bumped to 97% but is still unable to get through a sentence. Pt scheduled for surgery on Tuesday for a biopsy to get something removed from her L lower lung.      At the time of signout we were awaiting: labs/ ct imaging  History of Present Illness:              Chantelle Rao is a 69 y.o. female presenting to the ED for shortness of breath, beginning more than a week ago.  The complaint has been persistent, moderate to severe in severity, and worsened by breaths.  Patient is known to have a lung mass with a PET scan showing possibility of a malignancy.  She is scheduled this coming week for a biopsy.  She is concerned she may have a blood clot.  She is not hypoxemic.  She is mildly tachycardic and has a temperature 101.7.      ED Course as of 11/23/24 2026   Sat Nov 23, 2024 1957 A repeat EKG at 1929 interpreted by me at 1935.  Sinus tachycardia rate 104 bpm.  Axis normal.   ms QRS 90 ms  ms.  There is a lot of baseline wandering baseline artifact.  There is nonspecific ST-T change.  No evidence of acute ischemia.  No STEMI. [EC]   1958 CBC with Differential(!)  I will cell count 17.5, hemoglobin 13.4, hematocrit 41.1, platelet count 245,000, there is a slight left shift with neutrophils 14.2 [EC]   1959 Blood Gas Venous Full Panel(!)  VBG revealed a pH  7.46, pCO2 33, potassium low 3.3, lactate normal 0.9, this was done on room air. [EC]   1959 Sars-CoV-2 PCR  Coronavirus not detected  Influenza A/B is not detected/not detected [EC]   2000 Comprehensive Metabolic Panel(!)  Comprehensive metabolic panel is grossly unremarkable except for glucose of 120, potassium minimally low at 3.3 will replace this orally. [EC]   2000 Troponin I Series, High Sensitivity (0, 1 HR)  First troponin 4, second troponin 4 [EC]   2000 D-Dimer, VTE Exclusion(!)  D-dimer is elevated at 884 and a CT of the chest has been ordered by the prior provider. [EC]   2001 Chest x-ray shows no evidence of acute cardiopulmonary process. [EC]   2002 CT of the chest  IMPRESSION:  Suboptimal low-dose imaging technique limits evaluation. No  significant central pulmonary embolism.      Left lung base mass again noted consistent with known/suspected  hypermetabolic malignancy. Correlate with patient history.   [EC]      ED Course User Index  [EC] Bryon Briones, DO         Diagnoses as of 11/23/24 2026   Shortness of breath   Atypical pneumonia   Fever, unspecified fever cause   Leukocytosis, unspecified type       Medical Decision Making  On reevaluation the heart rate is still right around 100 bpm.  Patient is getting some IV fluids.  Patient has tachypnea with conversation.  Pulse ox on 3 L is 95%.  CT PE study is negative for pulmonary embolism.  I have discussed the case with the patient and the patient's .  I feel she requires hospitalization and so that she.  I discussed the case with the nocturnist Dr. VICENTE Britt and accepted to telemetry.  Patient will start Rocephin and azithromycin to cover for clinical pneumonia.  Patient received a DuoNeb aerosol as well as some IV Solu-Medrol.  Patient will also receive additional IV fluids.        Final diagnoses:   [R06.02] Shortness of breath   [J18.9] Atypical pneumonia   [R50.9] Fever, unspecified fever cause   [D72.829] Leukocytosis,  unspecified type           Procedure  Procedures    Bryon Briones, DO

## 2024-11-24 NOTE — PROGRESS NOTES
"Chantelle Rao is a 69 y.o. female on day 1 of admission presenting with Shortness of breath.    Subjective     No overnight events reported.     Patient resting in bed on room air with SpO2 91%. She reports shortness of breath, cough, and headache for about a week now. She had a fever at home.  She reports that she is still occasionally smoking cigarettes, more so when she is anxious or stressed. She is scheduled for bronchoscopy and biopsy with Dr. Larkin on 11/26 for pulmonary nodules. Plan of care discussed with patient, all questions answered.         Objective     Physical Exam  Constitutional:       General: She is not in acute distress.     Appearance: She is not toxic-appearing.   HENT:      Head: Normocephalic and atraumatic.      Mouth/Throat:      Mouth: Mucous membranes are moist.   Eyes:      Conjunctiva/sclera: Conjunctivae normal.   Cardiovascular:      Rate and Rhythm: Normal rate and regular rhythm.   Pulmonary:      Effort: No respiratory distress.      Breath sounds: Normal breath sounds. No wheezing or rales.      Comments: On room air   Abdominal:      General: There is no distension.      Palpations: Abdomen is soft.      Tenderness: There is no abdominal tenderness.   Musculoskeletal:         General: No swelling.   Skin:     General: Skin is warm and dry.   Neurological:      Mental Status: She is alert and oriented to person, place, and time.      Motor: No weakness.   Psychiatric:         Mood and Affect: Mood normal.         Behavior: Behavior normal.         Last Recorded Vitals  Blood pressure 130/67, pulse 90, temperature 36.3 °C (97.3 °F), temperature source Temporal, resp. rate 16, height 1.626 m (5' 4.02\"), weight 72.9 kg (160 lb 11.5 oz), SpO2 (!) 86%.  Intake/Output last 3 Shifts:  I/O last 3 completed shifts:  In: 1300 (17.8 mL/kg) [IV Piggyback:1300]  Out: - (0 mL/kg)   Weight: 72.9 kg     Relevant Results          Scheduled medications  azithromycin, 500 mg, oral, q24h " HANANE  cefTRIAXone, 1 g, intravenous, q24h  enoxaparin, 40 mg, subcutaneous, Daily  ipratropium-albuteroL, 3 mL, nebulization, TID  levothyroxine, 25 mcg, oral, Daily before breakfast  methylPREDNISolone sodium succinate (PF), 40 mg, intravenous, q8h  nicotine, 1 patch, transdermal, Daily  rOPINIRole, 4 mg, oral, BID      Continuous medications     PRN medications  PRN medications: acetaminophen, albuterol, ALPRAZolam, alum-mag hydroxide-simeth, benzocaine-menthol, guaiFENesin, ketorolac, melatonin, ondansetron, polyethylene glycol, zolpidem    Results for orders placed or performed during the hospital encounter of 11/23/24 (from the past 24 hours)   CBC with Differential   Result Value Ref Range    WBC 17.5 (H) 4.4 - 11.3 x10*3/uL    nRBC 0.0 0.0 - 0.0 /100 WBCs    RBC 4.60 4.00 - 5.20 x10*6/uL    Hemoglobin 13.4 12.0 - 16.0 g/dL    Hematocrit 41.1 36.0 - 46.0 %    MCV 89 80 - 100 fL    MCH 29.1 26.0 - 34.0 pg    MCHC 32.6 32.0 - 36.0 g/dL    RDW 13.7 11.5 - 14.5 %    Platelets 245 150 - 450 x10*3/uL    Neutrophils % 81.2 40.0 - 80.0 %    Immature Granulocytes %, Automated 0.5 0.0 - 0.9 %    Lymphocytes % 9.4 13.0 - 44.0 %    Monocytes % 7.3 2.0 - 10.0 %    Eosinophils % 1.1 0.0 - 6.0 %    Basophils % 0.5 0.0 - 2.0 %    Neutrophils Absolute 14.20 (H) 1.20 - 7.70 x10*3/uL    Immature Granulocytes Absolute, Automated 0.08 0.00 - 0.70 x10*3/uL    Lymphocytes Absolute 1.64 1.20 - 4.80 x10*3/uL    Monocytes Absolute 1.27 (H) 0.10 - 1.00 x10*3/uL    Eosinophils Absolute 0.19 0.00 - 0.70 x10*3/uL    Basophils Absolute 0.08 0.00 - 0.10 x10*3/uL   Comprehensive Metabolic Panel   Result Value Ref Range    Glucose 120 (H) 74 - 99 mg/dL    Sodium 139 136 - 145 mmol/L    Potassium 3.3 (L) 3.5 - 5.3 mmol/L    Chloride 104 98 - 107 mmol/L    Bicarbonate 25 21 - 32 mmol/L    Anion Gap 13 10 - 20 mmol/L    Urea Nitrogen 11 6 - 23 mg/dL    Creatinine 0.82 0.50 - 1.05 mg/dL    eGFR 78 >60 mL/min/1.73m*2    Calcium 9.2 8.6 - 10.3 mg/dL     Albumin 3.9 3.4 - 5.0 g/dL    Alkaline Phosphatase 99 33 - 136 U/L    Total Protein 7.8 6.4 - 8.2 g/dL    AST 28 9 - 39 U/L    Bilirubin, Total 0.5 0.0 - 1.2 mg/dL    ALT 22 7 - 45 U/L   Troponin I, High Sensitivity, Initial   Result Value Ref Range    Troponin I, High Sensitivity 4 0 - 13 ng/L   Troponin, High Sensitivity, 1 Hour   Result Value Ref Range    Troponin I, High Sensitivity 4 0 - 13 ng/L   Blood Gas Venous Full Panel   Result Value Ref Range    POCT pH, Venous 7.46 (H) 7.33 - 7.43 pH    POCT pCO2, Venous 33 (L) 41 - 51 mm Hg    POCT pO2, Venous 37 35 - 45 mm Hg    POCT SO2, Venous 62 45 - 75 %    POCT Oxy Hemoglobin, Venous 60.9 45.0 - 75.0 %    POCT Hematocrit Calculated, Venous 41.0 36.0 - 46.0 %    POCT Sodium, Venous 137 136 - 145 mmol/L    POCT Potassium, Venous 3.3 (L) 3.5 - 5.3 mmol/L    POCT Chloride, Venous 105 98 - 107 mmol/L    POCT Ionized Calicum, Venous 1.15 1.10 - 1.33 mmol/L    POCT Glucose, Venous 117 (H) 74 - 99 mg/dL    POCT Lactate, Venous 0.9 0.4 - 2.0 mmol/L    POCT Base Excess, Venous 0.3 -2.0 - 3.0 mmol/L    POCT HCO3 Calculated, Venous 23.5 22.0 - 26.0 mmol/L    POCT Hemoglobin, Venous 13.5 12.0 - 16.0 g/dL    POCT Anion Gap, Venous 12.0 10.0 - 25.0 mmol/L    Patient Temperature      FiO2 28 %   D-Dimer, VTE Exclusion   Result Value Ref Range    D-Dimer, Quantitative VTE Exclusion 884 (H) <=500 ng/mL FEU   B-type Natriuretic Peptide   Result Value Ref Range    BNP 56 0 - 99 pg/mL   Influenza A, and B PCR   Result Value Ref Range    Flu A Result Not Detected Not Detected    Flu B Result Not Detected Not Detected   Sars-CoV-2 PCR   Result Value Ref Range    Coronavirus 2019, PCR Not Detected Not Detected   Blood Culture    Specimen: Peripheral Venipuncture; Blood culture   Result Value Ref Range    Blood Culture Loaded on Instrument - Culture in progress    Blood Culture    Specimen: Peripheral Venipuncture; Blood culture   Result Value Ref Range    Blood Culture Loaded on  Instrument - Culture in progress    Urinalysis with Reflex Culture and Microscopic   Result Value Ref Range    Color, Urine Yellow Light-Yellow, Yellow, Dark-Yellow    Appearance, Urine Clear Clear    Specific Gravity, Urine >1.050 (N) 1.005 - 1.035    pH, Urine 7.0 5.0, 5.5, 6.0, 6.5, 7.0, 7.5, 8.0    Protein, Urine 30 (1+) (A) NEGATIVE, 10 (TRACE), 20 (TRACE) mg/dL    Glucose, Urine Normal Normal mg/dL    Blood, Urine NEGATIVE NEGATIVE    Ketones, Urine 20 (1+) (A) NEGATIVE mg/dL    Bilirubin, Urine NEGATIVE NEGATIVE    Urobilinogen, Urine Normal Normal mg/dL    Nitrite, Urine NEGATIVE NEGATIVE    Leukocyte Esterase, Urine NEGATIVE NEGATIVE   Urinalysis Microscopic   Result Value Ref Range    WBC, Urine 1-5 1-5, NONE /HPF    RBC, Urine 6-10 (A) NONE, 1-2, 3-5 /HPF    Squamous Epithelial Cells, Urine 1-9 (SPARSE) Reference range not established. /HPF   CBC and Auto Differential   Result Value Ref Range    WBC 15.3 (H) 4.4 - 11.3 x10*3/uL    nRBC 0.0 0.0 - 0.0 /100 WBCs    RBC 4.31 4.00 - 5.20 x10*6/uL    Hemoglobin 12.3 12.0 - 16.0 g/dL    Hematocrit 38.4 36.0 - 46.0 %    MCV 89 80 - 100 fL    MCH 28.5 26.0 - 34.0 pg    MCHC 32.0 32.0 - 36.0 g/dL    RDW 13.6 11.5 - 14.5 %    Platelets 229 150 - 450 x10*3/uL    Neutrophils % 91.3 40.0 - 80.0 %    Immature Granulocytes %, Automated 0.7 0.0 - 0.9 %    Lymphocytes % 5.9 13.0 - 44.0 %    Monocytes % 2.0 2.0 - 10.0 %    Eosinophils % 0.0 0.0 - 6.0 %    Basophils % 0.1 0.0 - 2.0 %    Neutrophils Absolute 13.97 (H) 1.20 - 7.70 x10*3/uL    Immature Granulocytes Absolute, Automated 0.11 0.00 - 0.70 x10*3/uL    Lymphocytes Absolute 0.90 (L) 1.20 - 4.80 x10*3/uL    Monocytes Absolute 0.31 0.10 - 1.00 x10*3/uL    Eosinophils Absolute 0.00 0.00 - 0.70 x10*3/uL    Basophils Absolute 0.02 0.00 - 0.10 x10*3/uL   Basic Metabolic Panel   Result Value Ref Range    Glucose 148 (H) 74 - 99 mg/dL    Sodium 140 136 - 145 mmol/L    Potassium 4.1 3.5 - 5.3 mmol/L    Chloride 110 (H) 98 -  107 mmol/L    Bicarbonate 23 21 - 32 mmol/L    Anion Gap 11 10 - 20 mmol/L    Urea Nitrogen 11 6 - 23 mg/dL    Creatinine 0.70 0.50 - 1.05 mg/dL    eGFR >90 >60 mL/min/1.73m*2    Calcium 9.4 8.6 - 10.3 mg/dL   Magnesium   Result Value Ref Range    Magnesium 2.15 1.60 - 2.40 mg/dL     CT angio chest for pulmonary embolism    Result Date: 11/23/2024  Interpreted By:  Gabo Bob, STUDY: CT ANGIO CHEST FOR PULMONARY EMBOLISM;  11/23/2024 7:14 pm   INDICATION: Signs/Symptoms:chest pain with elevated d dimer.   COMPARISON: 03/26/2024, PET imaging 10/30/2024   ACCESSION NUMBER(S): [id number]   ORDERING CLINICIAN: COMFORT JAVIER   TECHNIQUE: Contiguous axial images of the chest were obtained after the intravenous administration of iodinated contrast using angiographic PE protocol. Coronal and sagittal reformatted images were reconstructed from the axial data. MIP images were created on an independent workstation and reviewed.   FINDINGS: Suboptimal study using low-dose setting.   No significant pulmonary embolism given limitations.   Heart size within normal limits.   No significant adenopathy suspected. Evaluation of the lungs limited due to respiratory motion. Redemonstrated is a elongated nodular density in the superior segment of the right lower lobe which again measures up to approximately 16 mm in diameter not significantly changed. Calcified granuloma redemonstrated right upper lobe.   There is a 2 cm area of nodular consolidation in the left lung base which has not significantly changed. This was hypermetabolic on prior PET imaging 10/30/2024 and is suspicious for malignancy.   No new consolidation or other significant new pulmonary mass.   Spondylotic degeneration is redemonstrated.       Suboptimal low-dose imaging technique limits evaluation. No significant central pulmonary embolism.   Left lung base mass again noted consistent with known/suspected hypermetabolic malignancy. Correlate with patient  history.   MACRO: None.   Signed by: Gabo Bob 11/23/2024 7:55 PM Dictation workstation:   [id number]    XR chest 1 view    Result Date: 11/23/2024  Interpreted By:  Schoenberger, Joseph, STUDY: XR CHEST 1 VIEW;  11/23/2024 4:53 pm   INDICATION: Signs/Symptoms:Chest Pain.     COMPARISON: 01/15/2024   ACCESSION NUMBER(S): [id number]   ORDERING CLINICIAN: COMFORT JAVIER   FINDINGS:         CARDIOMEDIASTINAL SILHOUETTE: Cardiomediastinal silhouette is normal in size and configuration.   LUNGS: No definite new focal lung opacity. Coarsened lung markings unchanged. Tiny nodule in the periphery of the right mid lung is unchanged.   ABDOMEN: No remarkable upper abdominal findings.   BONES: No acute osseous changes.       1.  No evidence of acute cardiopulmonary process. Stable chest       MACRO: None   Signed by: Joseph Schoenberger 11/23/2024 5:05 PM Dictation workstation:   [id number]    Pulmonary Stress Test (6 Min. Walk)    Result Date: 11/7/2024  The patient had no significant desaturation walking in room air.  Sukh saturation 93%.    Complete Pulmonary Function Test Pre/Post Bronchodialator (Spirometry Pre/Post/DLCO/Lung Volumes)    Result Date: 11/7/2024  Spirometry shows severe obstruction with small airways bronchodilator response. Lung volumes show air trapping. Diffusing capacity is normal.    NM PET CT FDG wholebody    Result Date: 10/30/2024  Interpreted By:  Froylan Oliver and Hanreck James STUDY: NM PET CT FDG WHOLEBODY;  10/30/2024 9:30 am   INDICATION: Signs/Symptoms:previous PET CT 2020 shows lung malignancy. Pt had no follow up. liver and lung density. Symptomatic.. ,D72.829 Elevated white blood cell count, unspecified,R79.89 Other specified abnormal findings of blood chemistry,R91.8 Other nonspecific abnormal finding of lung field,K76.9 Liver disease, unspecified,C34.90 Malignant neoplasm of unspecified part of unspecified bronchus or lung (Multi)   COMPARISON: 03/26/2024 CT chest and  03/08/2024 CT abdomen pelvis   ACCESSION NUMBER(S): [id number]   ORDERING CLINICIAN: RAINA LUNDY   TECHNIQUE: DIVISION OF NUCLEAR MEDICINE POSITRON EMISSION TOMOGRAPHY (PET-CT)   The patient received an intravenous dose of 12.8 mCi of Fluorine-18 fluorodeoxyglucose (FDG).  Positron emission tomographic (PET) images from mid thigh to skull base were then acquired after a one hour delay. Also acquired was a contemporaneous low dose non-contrast CT scan performed for attenuation correction of PET images and anatomic localization.  The PET and CT images were digitally fused for display.  All images were acquired on a combined PET-CT scanner unit. Some areas of FDG accumulation may be described in standardized uptake value (SUV) units.   CODING: Initial Treatment Strategy (PI)   CALIBRATION: Dose Injection-to-Scan Interval (mins): 59 min Mediastinal bloodpool SUV (normal 1.5-2.5): 2.1 Blood glucose: 115 mg/dL   FINDINGS: HEAD AND NECK: No evidence of focal FDG avid lesion in the partially visualized brain parenchyma, noting that evaluation is limited because of the expected physiologic diffuse FDG uptake in the brain. No focal FDG avid  soft tissue lesion is seen in the neck. No FDG avid  cervical lymphadenopathy is present.     CHEST: Hypermetabolic left lung base nodule demonstrates max SUV of 6.1. A more superior mildly FDG avid left lower lobe calcified granuloma demonstrates max SUV 4.1.   Few right-sided nodular opacities are only minimally FDG avid. For example: Posterolateral right lower lobe nodular opacity with max SUV 1.2. Right upper lobe calcified nodule with max SUV 1.0.     A few mildly FDG avid mediastinal and bilateral hilar lymph nodes are seen. For example: Right hilar node with max SUV of 3.5. Left hilar node with max SUV of 3.4. Subcarinal node with max SUV 2.8.   ABDOMEN AND PELVIS: No FDG avid  soft tissue lesion is present in the abdomen and pelvis. No evidence of FDG avid  lymphadenopathy.  Physiologic radiotracer uptake is present in the liver and spleen with excretion into the bowel loops and the genitourinary tract.   MUSCULOSKELETAL: No focal FDG avid  lesion is seen in the axial or appendicular to suggest osseous metastasis. Hypermetabolic activity at the right aspect of the T8 vertebral body is likely degenerative in etiology given prominent osteophyte. A right hip arthroplasties present.       1. Hypermetabolic left lung base nodule suspicious for malignancy. Tissue sampling is recommended. 2. Additional bilateral pulmonary nodules, many of which demonstrate calcification and exhibit minimal to mild FDG avidity are likely benign. Continued attention on follow-up chest CT is recommended. 3. Nonspecific mildly hypermetabolic mediastinal and bilateral hilar lymph nodes are favored to be reactive.     I personally reviewed the images/study and I agree with the resident Jermaine Muller's findings as stated. This study was interpreted at [facility], [place], [place].   Signed by: Froylan Oliver 10/30/2024 10:53 AM Dictation workstation:   [id number]                  Assessment/Plan   Assessment & Plan  Shortness of breath    Anxiety    RLS (restless legs syndrome)    Lung nodules    Hypothyroidism    Fever    Tobacco use    Headache    Hypokalemia    #Lung nodules concerning for malignancy  #Shortness of breath  #Tobacco use  #Fever, resolved   - Patient scheduled for bronchoscopy and biopsy at [facility] [place]. Larkin on 11/26. No previous diagnosis of COPD, still smokes cigarettes  - CTA chest for PE: Suboptimal low-dose imaging technique limits evaluation. No significant central pulmonary embolism. Left lung base mass again noted consistent with known/suspected hypermetabolic malignancy. Correlate with patient history.   - Solumedrol 125 mg IV x 2 doses given on admission   - TMax 38.7C, afebrile today   - Trop 4 > 4   - BNP 56  - WBC 17.5 > 15.3  - Flu, COVID  negative   - Procal, BCx, sputum culture pending   - Azithromycin and ceftriaxone started on admission; will continue in setting of leukocytosis and fever on admission   - Start Solumedrol 40 mg IV q8h given suspected COPD   - Nicotine patch ordered; encourage cessation on discharge  - DuoNebs TID  - RT eval/treat protocol  - Tylenol PRN for fever   - Monitor SpO2 continuously; remains on room air   - Dr. Larkin offline, unable to notify him of patient admission on 11/24     #Headache, improving  - Patient reports headaches for over a year, no neurological deficits on exam   - CT head pending read   - Pain improved significantly with Toradol per patient  - Start Toradol 15 mg IV q6h PRN for headaches; continue for one day  - Monitor BMP while on Toradol     #Hypokalemia, resolved  - K 3.3 > 4.1 today  - Encourage PO intake as tolerated  - Daily BMP    #Hypothyroidism  - Continue levothyroxine 25 mcg every day    #Restless leg syndrome   - Continue ropinirole 4 mg BID     #Anxiety  - Continue alprazolam 0.5 mg TID PRN for anxiety (home med)     DVT PPx: Lovenox   Diet: Regular   Code Status: Full     Disposition: Patient requires inpatient management at this time.     Patient seen/evaluated and plan of care discussed with attending Dr. Kam Helm.       Kaylen Hernández PA-C  Attending Attestation:    Patient was seen and examined face to face, history and physical was taken personally at bedside the APRN-CNP, was present for the whole duration of the exam who participated in the documentation of this note. I performed the medical decision-making components (assessment and plan of care). I have reviewed the documentation and verified the findings in the note as written with additions or exceptions as stated in the body of this note.   69-year-old female with history of lifelong smoking, COPD, she is not on home oxygen, she does have left lower lung large nodule, is planned to have surgical removal 11/26.  She is  feeling better, she is not on oxygen pulse oxing low 90s, she does have headache, which is not new, patient suffering from headache for long time but she says she has some blurry vision, headache is bilateral, not pulsating, no numbness or weakness, positive rhonchi bilaterally, no chest pain.  We will continue with IV antibiotics, bronchodilators steroids, hopefully patient is ready for her biopsy, notify surgery on Monday.    Dr. Kam Helm MD  Internal Medicine

## 2024-11-24 NOTE — H&P
History Of Present Illness  Chantelle Rao is a 69 y.o. female with COPD, suspected metastatic lung nodule who was scheduled for biopsy on 11/26, presented to the [facility] emergency room for worsening shortness of breath, fever, and cough.  He has been having progressive shortness of breath over the last week.  Intermittent fevers at home.  She says she has difficulty speaking due to shortness of breath.  She states her cough is productive and is orange and red in color.  In addition she has sore throat and headache.    On arrival to the emergency room her temperature was 38.7, pulse 123, respiratory 12, blood pressure 16/083, saturation 95% room air.  Lab work was notable for potassium 3.3, WBC 17.5, negative influenza A/B and COVID-19.  Patient underwent CT angio of the chest which showed no pulmonary embolism, left lung base mass noted with suspected hypermetabolic malignancy.    In the ER the patient was given 1.5 L IV fluid, Solu-Medrol 125 mg IV, Toradol 15 mg IV, DuoNeb nebulizer, and Zithromax 500 mg IV, and Rocephin 2 g IV.     On the medical floor she was feeling a little bit better after treatment emergency room.       Past Medical History  COPD  Left Lower Lobe Lung Nodule  GERD  Iron deficiency anemia  Insomnia  Chronic low back pain  Lumbar spine disease  Iron deficiency anemia  Anxiety  Restless leg syndrome  Chronic Tobacco abuse  She has a past medical history of Acute frontal sinusitis, unspecified (10/30/2014), Other chronic diseases of tonsils and adenoids (06/03/2021), Other conditions influencing health status (10/12/2021), Other conditions influencing health status (04/16/2015), and Personal history of other infectious and parasitic diseases (02/27/2014).    Surgical History  She has a past surgical history that includes Hysterectomy (05/09/2014); Lumbar laminectomy (05/09/2014); US guided fine percutaneous aspiration (10/15/2020); and CT guided percutaneous biopsy lung (7/8/2020).  Cardiac  catheterization (no intervention), 7/2023     Social History  She reports that she has been smoking cigarettes. She started smoking about 22 months ago. She has a 0.5 pack-year smoking history. She has never used smokeless tobacco. She reports that she does not currently use alcohol. She reports that she does not use drugs.    Family History  Family History   Problem Relation Name Age of Onset    Breast cancer Mother      Breast cancer Sister      Cancer Other Family History     Breast cancer Mother's Sister          Allergies  Patient has no known allergies.    Review of Systems  10 point organ system reviewed, pertinent positives mentioned in HPI, others are negative.      Physical Exam  Examination by Emergency room physician  Constitutional/General: Alert and oriented x3, well appearing, non toxic in NAD  Head: Normocephalic and atraumatic  Eyes: PERRL, EOMI, conjunctiva normal, sclera non icteric  Mouth: Oropharynx clear, handling secretions, no trismus, no asymmetry of the posterior oropharynx or uvular edema  Neck: Supple, full ROM, non tender to palpation in the midline, no stridor, no crepitus, no meningeal signs  Respiratory: Lungs clear to auscultation bilaterally, no wheezes, rales, or rhonchi  Cardiovascular:  Regular rate. Regular rhythm. No murmurs, gallops, or rubs. 2+ distal pulses  Chest: No chest wall tenderness  GI:  Abdomen Soft, Non tender, Non distended.  +BS. No organomegaly, no palpable masses,  No rebound, guarding, or rigidity. No CVAT   Musculoskeletal: Moves all extremities x 4. Warm and well perfused, no clubbing, cyanosis, or edema. Capillary refill <3 seconds  Integument: skin warm and dry. No rashes.   Lymphatic: no lymphadenopathy noted  Neurologic:  No focal deficits, symmetric strength 5/5 in the upper and lower extremities bilaterally  Psychiatric: Normal Affect     Last Recorded Vitals  /55 (BP Location: Left arm, Patient Position: Sitting)   Pulse (!) 109   Temp 37.6 °C  (99.7 °F)   Resp 12   Wt 74.8 kg (165 lb)   SpO2 95%     Relevant Results  Results for orders placed or performed during the hospital encounter of 11/23/24 (from the past 24 hours)   CBC with Differential   Result Value Ref Range    WBC 17.5 (H) 4.4 - 11.3 x10*3/uL    nRBC 0.0 0.0 - 0.0 /100 WBCs    RBC 4.60 4.00 - 5.20 x10*6/uL    Hemoglobin 13.4 12.0 - 16.0 g/dL    Hematocrit 41.1 36.0 - 46.0 %    MCV 89 80 - 100 fL    MCH 29.1 26.0 - 34.0 pg    MCHC 32.6 32.0 - 36.0 g/dL    RDW 13.7 11.5 - 14.5 %    Platelets 245 150 - 450 x10*3/uL    Neutrophils % 81.2 40.0 - 80.0 %    Immature Granulocytes %, Automated 0.5 0.0 - 0.9 %    Lymphocytes % 9.4 13.0 - 44.0 %    Monocytes % 7.3 2.0 - 10.0 %    Eosinophils % 1.1 0.0 - 6.0 %    Basophils % 0.5 0.0 - 2.0 %    Neutrophils Absolute 14.20 (H) 1.20 - 7.70 x10*3/uL    Immature Granulocytes Absolute, Automated 0.08 0.00 - 0.70 x10*3/uL    Lymphocytes Absolute 1.64 1.20 - 4.80 x10*3/uL    Monocytes Absolute 1.27 (H) 0.10 - 1.00 x10*3/uL    Eosinophils Absolute 0.19 0.00 - 0.70 x10*3/uL    Basophils Absolute 0.08 0.00 - 0.10 x10*3/uL   Comprehensive Metabolic Panel   Result Value Ref Range    Glucose 120 (H) 74 - 99 mg/dL    Sodium 139 136 - 145 mmol/L    Potassium 3.3 (L) 3.5 - 5.3 mmol/L    Chloride 104 98 - 107 mmol/L    Bicarbonate 25 21 - 32 mmol/L    Anion Gap 13 10 - 20 mmol/L    Urea Nitrogen 11 6 - 23 mg/dL    Creatinine 0.82 0.50 - 1.05 mg/dL    eGFR 78 >60 mL/min/1.73m*2    Calcium 9.2 8.6 - 10.3 mg/dL    Albumin 3.9 3.4 - 5.0 g/dL    Alkaline Phosphatase 99 33 - 136 U/L    Total Protein 7.8 6.4 - 8.2 g/dL    AST 28 9 - 39 U/L    Bilirubin, Total 0.5 0.0 - 1.2 mg/dL    ALT 22 7 - 45 U/L   Troponin I, High Sensitivity, Initial   Result Value Ref Range    Troponin I, High Sensitivity 4 0 - 13 ng/L   Troponin, High Sensitivity, 1 Hour   Result Value Ref Range    Troponin I, High Sensitivity 4 0 - 13 ng/L   Blood Gas Venous Full Panel   Result Value Ref Range    POCT  pH, Venous 7.46 (H) 7.33 - 7.43 pH    POCT pCO2, Venous 33 (L) 41 - 51 mm Hg    POCT pO2, Venous 37 35 - 45 mm Hg    POCT SO2, Venous 62 45 - 75 %    POCT Oxy Hemoglobin, Venous 60.9 45.0 - 75.0 %    POCT Hematocrit Calculated, Venous 41.0 36.0 - 46.0 %    POCT Sodium, Venous 137 136 - 145 mmol/L    POCT Potassium, Venous 3.3 (L) 3.5 - 5.3 mmol/L    POCT Chloride, Venous 105 98 - 107 mmol/L    POCT Ionized Calicum, Venous 1.15 1.10 - 1.33 mmol/L    POCT Glucose, Venous 117 (H) 74 - 99 mg/dL    POCT Lactate, Venous 0.9 0.4 - 2.0 mmol/L    POCT Base Excess, Venous 0.3 -2.0 - 3.0 mmol/L    POCT HCO3 Calculated, Venous 23.5 22.0 - 26.0 mmol/L    POCT Hemoglobin, Venous 13.5 12.0 - 16.0 g/dL    POCT Anion Gap, Venous 12.0 10.0 - 25.0 mmol/L    Patient Temperature      FiO2 28 %   D-Dimer, VTE Exclusion   Result Value Ref Range    D-Dimer, Quantitative VTE Exclusion 884 (H) <=500 ng/mL FEU   B-type Natriuretic Peptide   Result Value Ref Range    BNP 56 0 - 99 pg/mL   Influenza A, and B PCR   Result Value Ref Range    Flu A Result Not Detected Not Detected    Flu B Result Not Detected Not Detected   Sars-CoV-2 PCR   Result Value Ref Range    Coronavirus 2019, PCR Not Detected Not Detected      CT angio chest for pulmonary embolism  Result Date: 11/23/2024  Interpreted By:  Gabo Bob, STUDY: CT ANGIO CHEST FOR PULMONARY EMBOLISM;  11/23/2024 7:14 pm   INDICATION: Signs/Symptoms:chest pain with elevated d dimer.   COMPARISON: 03/26/2024, PET imaging 10/30/2024      FINDINGS: Suboptimal study using low-dose setting.   No significant pulmonary embolism given limitations.   Heart size within normal limits.   No significant adenopathy suspected. Evaluation of the lungs limited due to respiratory motion. Redemonstrated is a elongated nodular density in the superior segment of the right lower lobe which again measures up to approximately 16 mm in diameter not significantly changed. Calcified granuloma redemonstrated right  upper lobe.   There is a 2 cm area of nodular consolidation in the left lung base which has not significantly changed. This was hypermetabolic on prior PET imaging 10/30/2024 and is suspicious for malignancy.   No new consolidation or other significant new pulmonary mass.   Spondylotic degeneration is redemonstrated.     Suboptimal low-dose imaging technique limits evaluation. No significant central pulmonary embolism.   Left lung base mass again noted consistent with known/suspected hypermetabolic malignancy. Correlate with patient history.   MACRO: None.   Signed by: Gabo Bob 11/23/2024 7:55 PM Dictation workstation:   [id number]    XR chest 1 view  Result Date: 11/23/2024  Interpreted By:  Schoenberger, Joseph, STUDY: XR CHEST 1 VIEW;  11/23/2024 4:53 pm   INDICATION: Signs/Symptoms:Chest Pain.     COMPARISON: 01/15/2024     1.  No evidence of acute cardiopulmonary process. Stable chest       MACRO: None   Signed by: Joseph Schoenberger 11/23/2024 5:05 PM Dictation workstation:   [id number]    NM PET CT FDG wholebody  Result Date: 10/30/2024  Interpreted By:  Froylan Oliver and Hanreck James STUDY: NM PET CT FDG WHOLEBODY;  10/30/2024 9:30 am   INDICATION: Signs/Symptoms:previous PET CT 2020 shows lung malignancy. Pt had no follow up. liver and lung density. Symptomatic.  1. Hypermetabolic left lung base nodule suspicious for malignancy. Tissue sampling is recommended. 2. Additional bilateral pulmonary nodules, many of which demonstrate calcification and exhibit minimal to mild FDG avidity are likely benign. Continued attention on follow-up chest CT is recommended. 3. Nonspecific mildly hypermetabolic mediastinal and bilateral hilar lymph nodes are favored to be reactive.     I personally reviewed the images/study and I agree with the resident Jermaine Muller's findings as stated. This study was interpreted at [facility], [place], [place].   Signed by: Froylan Oliver  10/30/2024 10:53 AM Dictation workstation:   [id number]      Assessment/Plan   Sepsis (fever, tachycardia, leukocytosis)  Pneumonia  COPD  Suspected Malignant Pulmonary Nodule  Hypothyroidism  GERD  Chronic Low Back Pain  Anxiety  Restless leg syndrome  Chronic tobacco abuse  DVT Prophylaxis    PLAN  Admit to regular nursing floor, inpatient status.  Continue with IV antibiotics with Rocephin and azithromycin.  Patient replaced on scheduled Mucinex, and will attempt to collect sputum culture.  Oxygen and DuoNebs will be available as needed.  Hold patient's diuretic, continue other home medications.  Patient replaced on nicotine patch with smoking cessation education.  Lovenox has been ordered for DVT prophylaxis.      Ray Britt DO

## 2024-11-24 NOTE — CARE PLAN
The patient's goals for the shift include      The clinical goals for the shift include pt will tolerate antibitics and be free of falls and injuries    Patient tolerated iv antibiotics over night and was able to maintains spo2 greater than 90% over night

## 2024-11-24 NOTE — CARE PLAN
The patient's goals for the shift include      The clinical goals for the shift include will not be SOB    Goal met. Vss. Patient c/o having a headache that is frequent for the past year scoring a 9/10. Toradol administered as ordered, positive results. Oral ATB given.

## 2024-11-25 ENCOUNTER — HOSPITAL ENCOUNTER (OUTPATIENT)
Dept: RADIOLOGY | Facility: HOSPITAL | Age: 69
End: 2024-11-25
Payer: MEDICARE

## 2024-11-25 ENCOUNTER — TELEPHONE (OUTPATIENT)
Dept: CARDIAC SURGERY | Facility: CLINIC | Age: 69
End: 2024-11-25
Payer: MEDICARE

## 2024-11-25 VITALS
RESPIRATION RATE: 16 BRPM | BODY MASS INDEX: 27.44 KG/M2 | SYSTOLIC BLOOD PRESSURE: 157 MMHG | OXYGEN SATURATION: 93 % | HEIGHT: 64 IN | HEART RATE: 93 BPM | DIASTOLIC BLOOD PRESSURE: 79 MMHG | TEMPERATURE: 99.1 F | WEIGHT: 160.72 LBS

## 2024-11-25 LAB
ANION GAP SERPL CALC-SCNC: 15 MMOL/L (ref 10–20)
BUN SERPL-MCNC: 16 MG/DL (ref 6–23)
CALCIUM SERPL-MCNC: 9.6 MG/DL (ref 8.6–10.3)
CHLORIDE SERPL-SCNC: 110 MMOL/L (ref 98–107)
CO2 SERPL-SCNC: 19 MMOL/L (ref 21–32)
CREAT SERPL-MCNC: 0.72 MG/DL (ref 0.5–1.05)
EGFRCR SERPLBLD CKD-EPI 2021: >90 ML/MIN/1.73M*2
ERYTHROCYTE [DISTWIDTH] IN BLOOD BY AUTOMATED COUNT: 13.9 % (ref 11.5–14.5)
GLUCOSE SERPL-MCNC: 129 MG/DL (ref 74–99)
HCT VFR BLD AUTO: 40.1 % (ref 36–46)
HGB BLD-MCNC: 12.9 G/DL (ref 12–16)
MAGNESIUM SERPL-MCNC: 2.2 MG/DL (ref 1.6–2.4)
MCH RBC QN AUTO: 29.7 PG (ref 26–34)
MCHC RBC AUTO-ENTMCNC: 32.2 G/DL (ref 32–36)
MCV RBC AUTO: 92 FL (ref 80–100)
MGC ASCENT PFT - FEV1 - POST: 1.17
MGC ASCENT PFT - FEV1 - PRE: 0.94
MGC ASCENT PFT - FEV1 - PREDICTED: 2.16
MGC ASCENT PFT - FVC - POST: 1.93
MGC ASCENT PFT - FVC - PRE: 1.69
MGC ASCENT PFT - FVC - PREDICTED: 2.76
NRBC BLD-RTO: 0 /100 WBCS (ref 0–0)
PLATELET # BLD AUTO: 281 X10*3/UL (ref 150–450)
POTASSIUM SERPL-SCNC: 3.8 MMOL/L (ref 3.5–5.3)
PROCALCITONIN SERPL-MCNC: 0.06 NG/ML
RBC # BLD AUTO: 4.34 X10*6/UL (ref 4–5.2)
SODIUM SERPL-SCNC: 140 MMOL/L (ref 136–145)
WBC # BLD AUTO: 19.4 X10*3/UL (ref 4.4–11.3)

## 2024-11-25 PROCEDURE — 2500000001 HC RX 250 WO HCPCS SELF ADMINISTERED DRUGS (ALT 637 FOR MEDICARE OP): Mod: IPSPLIT | Performed by: HOSPITALIST

## 2024-11-25 PROCEDURE — 2500000002 HC RX 250 W HCPCS SELF ADMINISTERED DRUGS (ALT 637 FOR MEDICARE OP, ALT 636 FOR OP/ED): Mod: IPSPLIT

## 2024-11-25 PROCEDURE — 2500000004 HC RX 250 GENERAL PHARMACY W/ HCPCS (ALT 636 FOR OP/ED): Mod: IPSPLIT

## 2024-11-25 PROCEDURE — 82374 ASSAY BLOOD CARBON DIOXIDE: CPT | Mod: IPSPLIT

## 2024-11-25 PROCEDURE — 94762 N-INVAS EAR/PLS OXIMTRY CONT: CPT | Mod: IPSPLIT

## 2024-11-25 PROCEDURE — 99239 HOSP IP/OBS DSCHRG MGMT >30: CPT | Performed by: NURSE PRACTITIONER

## 2024-11-25 PROCEDURE — 83735 ASSAY OF MAGNESIUM: CPT | Mod: IPSPLIT

## 2024-11-25 PROCEDURE — 36415 COLL VENOUS BLD VENIPUNCTURE: CPT | Mod: IPSPLIT

## 2024-11-25 PROCEDURE — 94640 AIRWAY INHALATION TREATMENT: CPT | Mod: IPSPLIT

## 2024-11-25 PROCEDURE — 9420000001 HC RT PATIENT EDUCATION 5 MIN: Mod: IPSPLIT

## 2024-11-25 PROCEDURE — 85027 COMPLETE CBC AUTOMATED: CPT | Mod: IPSPLIT

## 2024-11-25 PROCEDURE — 2500000002 HC RX 250 W HCPCS SELF ADMINISTERED DRUGS (ALT 637 FOR MEDICARE OP, ALT 636 FOR OP/ED): Mod: IPSPLIT | Performed by: HOSPITALIST

## 2024-11-25 PROCEDURE — 94760 N-INVAS EAR/PLS OXIMETRY 1: CPT | Mod: IPSPLIT

## 2024-11-25 RX ORDER — PREDNISONE 10 MG/1
TABLET ORAL
Qty: 18 TABLET | Refills: 0 | Status: SHIPPED | OUTPATIENT
Start: 2024-11-25 | End: 2024-12-06

## 2024-11-25 RX ORDER — CEFUROXIME AXETIL 500 MG/1
500 TABLET ORAL 2 TIMES DAILY
Qty: 14 TABLET | Refills: 0 | Status: SHIPPED | OUTPATIENT
Start: 2024-11-25 | End: 2024-11-29 | Stop reason: HOSPADM

## 2024-11-25 RX ORDER — AZITHROMYCIN 500 MG/1
500 TABLET, FILM COATED ORAL
Qty: 1 TABLET | Refills: 0 | Status: SHIPPED | OUTPATIENT
Start: 2024-11-26 | End: 2024-11-29 | Stop reason: HOSPADM

## 2024-11-25 RX ADMIN — KETOROLAC TROMETHAMINE 15 MG: 15 INJECTION INTRAMUSCULAR; INTRAVENOUS at 08:52

## 2024-11-25 RX ADMIN — LEVOTHYROXINE SODIUM 25 MCG: 25 TABLET ORAL at 06:34

## 2024-11-25 RX ADMIN — LEVALBUTEROL HYDROCHLORIDE 0.63 MG: 0.63 SOLUTION RESPIRATORY (INHALATION) at 08:47

## 2024-11-25 RX ADMIN — ALPRAZOLAM 0.5 MG: 0.25 TABLET ORAL at 11:21

## 2024-11-25 RX ADMIN — GUAIFENESIN 200 MG: 200 SOLUTION ORAL at 01:05

## 2024-11-25 RX ADMIN — ROPINIROLE HYDROCHLORIDE 4 MG: 1 TABLET, FILM COATED ORAL at 08:45

## 2024-11-25 RX ADMIN — AZITHROMYCIN DIHYDRATE 500 MG: 250 TABLET, FILM COATED ORAL at 08:45

## 2024-11-25 RX ADMIN — METHYLPREDNISOLONE SODIUM SUCCINATE 40 MG: 40 INJECTION, POWDER, FOR SOLUTION INTRAMUSCULAR; INTRAVENOUS at 01:00

## 2024-11-25 ASSESSMENT — PAIN SCALES - GENERAL: PAINLEVEL_OUTOF10: 6

## 2024-11-25 ASSESSMENT — ACTIVITIES OF DAILY LIVING (ADL): LACK_OF_TRANSPORTATION: NO

## 2024-11-25 ASSESSMENT — PAIN DESCRIPTION - LOCATION: LOCATION: HEAD

## 2024-11-25 NOTE — CARE PLAN
The patient's goals for the shift include      The clinical goals for the shift include patients spo2 will remain greater than 92% this shift    Over the shift, the patient did make progress toward the following goals. Pt maintained SPO2 >92% throughout shift. Medicated with cough syrup x1 reported effective. No changes in POC, pt plans on being discharged 11/25/24.

## 2024-11-25 NOTE — CARE PLAN
The patient's goals for the shift include      The clinical goals for the shift include spo2 will maintain 92% or greater on room air    Goal met. Vss. Administered Toradol d/t patient c/o headache. Positive results. Patient discharging to home. Discharge instructions reviewed with patient that includes, follow up appointments, three new medications, and education on meds and SOB.

## 2024-11-25 NOTE — TELEPHONE ENCOUNTER
Was notified by Dr. Larkin that Chantelle is inpatient currently. Notified Kevin RN with IP at [facility] that pt does have pneumonia and will need to reschedule her biopsy that was scheduled for 11/26/24. He is aware. Updated Marilu Manning CNP as well.

## 2024-11-25 NOTE — DISCHARGE INSTR - OTHER ORDERS
Thank you for choosing [facility] for your Health Care needs.  Also, thank you for allowing us to take you and your families preferences into account when determining your discharge plan.  Stay well!     You may receive a survey in the mail within the next couple weeks. Please take the time to complete it and return it. Your input is ALWAYS important to us. Thank you!  Your Care Transition Team - Cande Braun & Angie - [phone]      For questions about your medications listed on your discharge instructions, please call the Nurses Station at [phone].

## 2024-11-25 NOTE — PROGRESS NOTES
11/25/24 1107   Discharge Planning   Living Arrangements Alone   Support Systems Children;Friends/neighbors   Assistance Needed Patient alert and oriented;  Patient says she lives alone in an apartment.  She says she is independent with ADLS, IADLS, ambulation and drives.  Her friend Rob should be able to pick her up on discharge.  (he lives in another apartment where she lives-no relationship/just friends)   Type of Residence Private residence   Number of Stairs to Enter Residence 14   Number of Stairs Within Residence 0   Do you have animals or pets at home? No   Home or Post Acute Services None   Expected Discharge Disposition Home   Does the patient need discharge transport arranged? No   Financial Resource Strain   How hard is it for you to pay for the very basics like food, housing, medical care, and heating? Not hard   Housing Stability   In the last 12 months, was there a time when you were not able to pay the mortgage or rent on time? N   In the past 12 months, how many times have you moved where you were living? 0   At any time in the past 12 months, were you homeless or living in a shelter (including now)? N   Transportation Needs   In the past 12 months, has lack of transportation kept you from medical appointments or from getting medications? no   In the past 12 months, has lack of transportation kept you from meetings, work, or from getting things needed for daily living? No   Stroke Family Assessment   Stroke Family Assessment Needed No   Intensity of Service   Intensity of Service 0-30 min     Confirmed address and pharmacy.  PCP is Antonio Arambula.  Her son Kwame is her main .      DC PLAN:  Home  secure

## 2024-11-26 ENCOUNTER — APPOINTMENT (OUTPATIENT)
Dept: GASTROENTEROLOGY | Facility: HOSPITAL | Age: 69
End: 2024-11-26
Payer: MEDICARE

## 2024-11-26 ENCOUNTER — HOSPITAL ENCOUNTER (INPATIENT)
Facility: HOSPITAL | Age: 69
LOS: 2 days | Discharge: HOME | End: 2024-11-29
Attending: INTERNAL MEDICINE | Admitting: INTERNAL MEDICINE
Payer: MEDICARE

## 2024-11-26 ENCOUNTER — APPOINTMENT (OUTPATIENT)
Dept: HEMATOLOGY/ONCOLOGY | Facility: HOSPITAL | Age: 69
End: 2024-11-26
Payer: MEDICARE

## 2024-11-26 ENCOUNTER — APPOINTMENT (OUTPATIENT)
Dept: RADIOLOGY | Facility: HOSPITAL | Age: 69
End: 2024-11-26
Payer: MEDICARE

## 2024-11-26 ENCOUNTER — TELEPHONE (OUTPATIENT)
Dept: PRIMARY CARE | Facility: CLINIC | Age: 69
End: 2024-11-26
Payer: MEDICARE

## 2024-11-26 ENCOUNTER — APPOINTMENT (OUTPATIENT)
Dept: CARDIOLOGY | Facility: HOSPITAL | Age: 69
End: 2024-11-26
Payer: MEDICARE

## 2024-11-26 DIAGNOSIS — J18.9 PNEUMONIA OF BOTH LUNGS DUE TO INFECTIOUS ORGANISM, UNSPECIFIED PART OF LUNG: ICD-10-CM

## 2024-11-26 DIAGNOSIS — B33.8 RSV INFECTION: ICD-10-CM

## 2024-11-26 DIAGNOSIS — B33.8 RSV (RESPIRATORY SYNCYTIAL VIRUS INFECTION): ICD-10-CM

## 2024-11-26 DIAGNOSIS — R06.02 SHORTNESS OF BREATH: Primary | ICD-10-CM

## 2024-11-26 PROBLEM — I70.0 ATHEROSCLEROSIS OF AORTA (CMS-HCC): Chronic | Status: ACTIVE | Noted: 2018-11-07

## 2024-11-26 PROBLEM — J21.0 RSV (ACUTE BRONCHIOLITIS DUE TO RESPIRATORY SYNCYTIAL VIRUS): Status: ACTIVE | Noted: 2024-11-26

## 2024-11-26 PROBLEM — R06.2 WHEEZING: Status: ACTIVE | Noted: 2024-11-26

## 2024-11-26 PROBLEM — R53.83 FATIGUE: Status: ACTIVE | Noted: 2023-04-03

## 2024-11-26 LAB
ALBUMIN SERPL BCP-MCNC: 3.9 G/DL (ref 3.4–5)
ALP SERPL-CCNC: 99 U/L (ref 33–136)
ALT SERPL W P-5'-P-CCNC: 28 U/L (ref 7–45)
ANION GAP BLDV CALCULATED.4IONS-SCNC: 10 MMOL/L (ref 10–25)
ANION GAP SERPL CALC-SCNC: 14 MMOL/L (ref 10–20)
AST SERPL W P-5'-P-CCNC: 22 U/L (ref 9–39)
ATRIAL RATE: 104 BPM
ATRIAL RATE: 117 BPM
BACTERIA SPEC RESP CULT: NORMAL
BASE EXCESS BLDV CALC-SCNC: 0.2 MMOL/L (ref -2–3)
BASOPHILS # BLD AUTO: 0.06 X10*3/UL (ref 0–0.1)
BASOPHILS NFR BLD AUTO: 0.5 %
BILIRUB SERPL-MCNC: 0.3 MG/DL (ref 0–1.2)
BNP SERPL-MCNC: 208 PG/ML (ref 0–99)
BODY TEMPERATURE: ABNORMAL
BUN SERPL-MCNC: 18 MG/DL (ref 6–23)
CA-I BLDV-SCNC: 1.22 MMOL/L (ref 1.1–1.33)
CALCIUM SERPL-MCNC: 9 MG/DL (ref 8.6–10.3)
CARDIAC TROPONIN I PNL SERPL HS: 19 NG/L (ref 0–13)
CARDIAC TROPONIN I PNL SERPL HS: 5 NG/L (ref 0–13)
CHLORIDE BLDV-SCNC: 104 MMOL/L (ref 98–107)
CHLORIDE SERPL-SCNC: 105 MMOL/L (ref 98–107)
CO2 SERPL-SCNC: 24 MMOL/L (ref 21–32)
CREAT SERPL-MCNC: 0.89 MG/DL (ref 0.5–1.05)
EGFRCR SERPLBLD CKD-EPI 2021: 70 ML/MIN/1.73M*2
EOSINOPHIL # BLD AUTO: 0.12 X10*3/UL (ref 0–0.7)
EOSINOPHIL NFR BLD AUTO: 1 %
ERYTHROCYTE [DISTWIDTH] IN BLOOD BY AUTOMATED COUNT: 13.8 % (ref 11.5–14.5)
FLUAV RNA RESP QL NAA+PROBE: NOT DETECTED
FLUBV RNA RESP QL NAA+PROBE: NOT DETECTED
GLUCOSE BLDV-MCNC: 96 MG/DL (ref 74–99)
GLUCOSE SERPL-MCNC: 83 MG/DL (ref 74–99)
GRAM STN SPEC: NORMAL
GRAM STN SPEC: NORMAL
HCO3 BLDV-SCNC: 24.6 MMOL/L (ref 22–26)
HCT VFR BLD AUTO: 40.7 % (ref 36–46)
HCT VFR BLD EST: 36 % (ref 36–46)
HGB BLD-MCNC: 13.5 G/DL (ref 12–16)
HGB BLDV-MCNC: 12 G/DL (ref 12–16)
IMM GRANULOCYTES # BLD AUTO: 0.32 X10*3/UL (ref 0–0.7)
IMM GRANULOCYTES NFR BLD AUTO: 2.7 % (ref 0–0.9)
INHALED O2 CONCENTRATION: 28 %
LACTATE BLDV-SCNC: 1.7 MMOL/L (ref 0.4–2)
LACTATE SERPL-SCNC: 1.5 MMOL/L (ref 0.4–2)
LYMPHOCYTES # BLD AUTO: 2.5 X10*3/UL (ref 1.2–4.8)
LYMPHOCYTES NFR BLD AUTO: 21.2 %
MAGNESIUM SERPL-MCNC: 1.89 MG/DL (ref 1.6–2.4)
MCH RBC QN AUTO: 29 PG (ref 26–34)
MCHC RBC AUTO-ENTMCNC: 33.2 G/DL (ref 32–36)
MCV RBC AUTO: 88 FL (ref 80–100)
MONOCYTES # BLD AUTO: 1.14 X10*3/UL (ref 0.1–1)
MONOCYTES NFR BLD AUTO: 9.6 %
NEUTROPHILS # BLD AUTO: 7.68 X10*3/UL (ref 1.2–7.7)
NEUTROPHILS NFR BLD AUTO: 65 %
NRBC BLD-RTO: 0 /100 WBCS (ref 0–0)
OXYHGB MFR BLDV: 67.1 % (ref 45–75)
P AXIS: 63 DEGREES
P AXIS: 64 DEGREES
P OFFSET: 196 MS
P OFFSET: 198 MS
P ONSET: 150 MS
P ONSET: 152 MS
PCO2 BLDV: 38 MM HG (ref 41–51)
PH BLDV: 7.42 PH (ref 7.33–7.43)
PLATELET # BLD AUTO: 299 X10*3/UL (ref 150–450)
PO2 BLDV: 42 MM HG (ref 35–45)
POTASSIUM BLDV-SCNC: 3.5 MMOL/L (ref 3.5–5.3)
POTASSIUM SERPL-SCNC: 3.5 MMOL/L (ref 3.5–5.3)
PR INTERVAL: 122 MS
PR INTERVAL: 126 MS
PROT SERPL-MCNC: 7.3 G/DL (ref 6.4–8.2)
Q ONSET: 213 MS
Q ONSET: 213 MS
QRS COUNT: 17 BEATS
QRS COUNT: 19 BEATS
QRS DURATION: 86 MS
QRS DURATION: 90 MS
QT INTERVAL: 340 MS
QT INTERVAL: 366 MS
QTC CALCULATION(BAZETT): 474 MS
QTC CALCULATION(BAZETT): 481 MS
QTC FREDERICIA: 425 MS
QTC FREDERICIA: 439 MS
R AXIS: 79 DEGREES
R AXIS: 93 DEGREES
RBC # BLD AUTO: 4.65 X10*6/UL (ref 4–5.2)
RSV RNA RESP QL NAA+PROBE: DETECTED
SAO2 % BLDV: 68 % (ref 45–75)
SARS-COV-2 RNA RESP QL NAA+PROBE: NOT DETECTED
SODIUM BLDV-SCNC: 135 MMOL/L (ref 136–145)
SODIUM SERPL-SCNC: 139 MMOL/L (ref 136–145)
T AXIS: -1 DEGREES
T AXIS: 3 DEGREES
T OFFSET: 383 MS
T OFFSET: 396 MS
VENTRICULAR RATE: 104 BPM
VENTRICULAR RATE: 117 BPM
WBC # BLD AUTO: 11.8 X10*3/UL (ref 4.4–11.3)

## 2024-11-26 PROCEDURE — 2500000002 HC RX 250 W HCPCS SELF ADMINISTERED DRUGS (ALT 637 FOR MEDICARE OP, ALT 636 FOR OP/ED): Performed by: NURSE PRACTITIONER

## 2024-11-26 PROCEDURE — 94664 DEMO&/EVAL PT USE INHALER: CPT

## 2024-11-26 PROCEDURE — G0378 HOSPITAL OBSERVATION PER HR: HCPCS

## 2024-11-26 PROCEDURE — 99285 EMERGENCY DEPT VISIT HI MDM: CPT | Mod: 25

## 2024-11-26 PROCEDURE — 36415 COLL VENOUS BLD VENIPUNCTURE: CPT

## 2024-11-26 PROCEDURE — 2500000002 HC RX 250 W HCPCS SELF ADMINISTERED DRUGS (ALT 637 FOR MEDICARE OP, ALT 636 FOR OP/ED): Performed by: STUDENT IN AN ORGANIZED HEALTH CARE EDUCATION/TRAINING PROGRAM

## 2024-11-26 PROCEDURE — 84132 ASSAY OF SERUM POTASSIUM: CPT

## 2024-11-26 PROCEDURE — 9420000001 HC RT PATIENT EDUCATION 5 MIN

## 2024-11-26 PROCEDURE — 93005 ELECTROCARDIOGRAM TRACING: CPT

## 2024-11-26 PROCEDURE — 94760 N-INVAS EAR/PLS OXIMETRY 1: CPT

## 2024-11-26 PROCEDURE — 71275 CT ANGIOGRAPHY CHEST: CPT

## 2024-11-26 PROCEDURE — 2550000001 HC RX 255 CONTRASTS

## 2024-11-26 PROCEDURE — 84484 ASSAY OF TROPONIN QUANT: CPT

## 2024-11-26 PROCEDURE — 2500000002 HC RX 250 W HCPCS SELF ADMINISTERED DRUGS (ALT 637 FOR MEDICARE OP, ALT 636 FOR OP/ED)

## 2024-11-26 PROCEDURE — 96375 TX/PRO/DX INJ NEW DRUG ADDON: CPT | Mod: 59

## 2024-11-26 PROCEDURE — 96366 THER/PROPH/DIAG IV INF ADDON: CPT

## 2024-11-26 PROCEDURE — 2500000005 HC RX 250 GENERAL PHARMACY W/O HCPCS

## 2024-11-26 PROCEDURE — 2500000001 HC RX 250 WO HCPCS SELF ADMINISTERED DRUGS (ALT 637 FOR MEDICARE OP): Performed by: STUDENT IN AN ORGANIZED HEALTH CARE EDUCATION/TRAINING PROGRAM

## 2024-11-26 PROCEDURE — 85025 COMPLETE CBC W/AUTO DIFF WBC: CPT

## 2024-11-26 PROCEDURE — 2500000001 HC RX 250 WO HCPCS SELF ADMINISTERED DRUGS (ALT 637 FOR MEDICARE OP)

## 2024-11-26 PROCEDURE — 99223 1ST HOSP IP/OBS HIGH 75: CPT | Performed by: NURSE PRACTITIONER

## 2024-11-26 PROCEDURE — 96365 THER/PROPH/DIAG IV INF INIT: CPT | Mod: 59

## 2024-11-26 PROCEDURE — 96372 THER/PROPH/DIAG INJ SC/IM: CPT

## 2024-11-26 PROCEDURE — 2500000004 HC RX 250 GENERAL PHARMACY W/ HCPCS (ALT 636 FOR OP/ED)

## 2024-11-26 PROCEDURE — 87637 SARSCOV2&INF A&B&RSV AMP PRB: CPT

## 2024-11-26 PROCEDURE — 83735 ASSAY OF MAGNESIUM: CPT

## 2024-11-26 PROCEDURE — 94640 AIRWAY INHALATION TREATMENT: CPT

## 2024-11-26 PROCEDURE — 83605 ASSAY OF LACTIC ACID: CPT

## 2024-11-26 PROCEDURE — 83880 ASSAY OF NATRIURETIC PEPTIDE: CPT

## 2024-11-26 RX ORDER — LEVOFLOXACIN 5 MG/ML
500 INJECTION, SOLUTION INTRAVENOUS EVERY 24 HOURS
Status: DISCONTINUED | OUTPATIENT
Start: 2024-11-27 | End: 2024-11-27

## 2024-11-26 RX ORDER — LEVOTHYROXINE SODIUM 25 UG/1
25 TABLET ORAL
Status: DISCONTINUED | OUTPATIENT
Start: 2024-11-27 | End: 2024-11-29 | Stop reason: HOSPADM

## 2024-11-26 RX ORDER — BENZONATATE 100 MG/1
100 CAPSULE ORAL ONCE
Status: COMPLETED | OUTPATIENT
Start: 2024-11-26 | End: 2024-11-26

## 2024-11-26 RX ORDER — ACETAMINOPHEN 325 MG/1
650 TABLET ORAL EVERY 4 HOURS PRN
Status: DISCONTINUED | OUTPATIENT
Start: 2024-11-26 | End: 2024-11-29 | Stop reason: HOSPADM

## 2024-11-26 RX ORDER — ALBUTEROL SULFATE 0.83 MG/ML
2.5 SOLUTION RESPIRATORY (INHALATION) EVERY 2 HOUR PRN
Status: DISCONTINUED | OUTPATIENT
Start: 2024-11-26 | End: 2024-11-29 | Stop reason: HOSPADM

## 2024-11-26 RX ORDER — TALC
6 POWDER (GRAM) TOPICAL NIGHTLY PRN
Status: DISCONTINUED | OUTPATIENT
Start: 2024-11-26 | End: 2024-11-29 | Stop reason: HOSPADM

## 2024-11-26 RX ORDER — ONDANSETRON HYDROCHLORIDE 2 MG/ML
4 INJECTION, SOLUTION INTRAVENOUS EVERY 8 HOURS PRN
Status: DISCONTINUED | OUTPATIENT
Start: 2024-11-26 | End: 2024-11-29 | Stop reason: HOSPADM

## 2024-11-26 RX ORDER — GUAIFENESIN 600 MG/1
600 TABLET, EXTENDED RELEASE ORAL EVERY 12 HOURS PRN
Status: DISCONTINUED | OUTPATIENT
Start: 2024-11-26 | End: 2024-11-29 | Stop reason: HOSPADM

## 2024-11-26 RX ORDER — ACETAMINOPHEN 650 MG/1
650 SUPPOSITORY RECTAL EVERY 4 HOURS PRN
Status: DISCONTINUED | OUTPATIENT
Start: 2024-11-26 | End: 2024-11-27

## 2024-11-26 RX ORDER — ONDANSETRON 4 MG/1
4 TABLET, ORALLY DISINTEGRATING ORAL EVERY 8 HOURS PRN
Status: DISCONTINUED | OUTPATIENT
Start: 2024-11-26 | End: 2024-11-29 | Stop reason: HOSPADM

## 2024-11-26 RX ORDER — PANTOPRAZOLE SODIUM 40 MG/10ML
40 INJECTION, POWDER, LYOPHILIZED, FOR SOLUTION INTRAVENOUS
Status: DISCONTINUED | OUTPATIENT
Start: 2024-11-27 | End: 2024-11-27

## 2024-11-26 RX ORDER — POLYETHYLENE GLYCOL 3350 17 G/17G
17 POWDER, FOR SOLUTION ORAL DAILY
Status: DISCONTINUED | OUTPATIENT
Start: 2024-11-26 | End: 2024-11-29 | Stop reason: HOSPADM

## 2024-11-26 RX ORDER — GUAIFENESIN/DEXTROMETHORPHAN 100-10MG/5
5 SYRUP ORAL EVERY 4 HOURS PRN
Status: DISCONTINUED | OUTPATIENT
Start: 2024-11-26 | End: 2024-11-29 | Stop reason: HOSPADM

## 2024-11-26 RX ORDER — LEVOFLOXACIN 5 MG/ML
750 INJECTION, SOLUTION INTRAVENOUS ONCE
Status: COMPLETED | OUTPATIENT
Start: 2024-11-26 | End: 2024-11-26

## 2024-11-26 RX ORDER — IPRATROPIUM BROMIDE AND ALBUTEROL SULFATE 2.5; .5 MG/3ML; MG/3ML
3 SOLUTION RESPIRATORY (INHALATION) 3 TIMES DAILY
Status: DISCONTINUED | OUTPATIENT
Start: 2024-11-26 | End: 2024-11-26 | Stop reason: SDUPTHER

## 2024-11-26 RX ORDER — ENOXAPARIN SODIUM 100 MG/ML
40 INJECTION SUBCUTANEOUS EVERY 24 HOURS
Status: DISCONTINUED | OUTPATIENT
Start: 2024-11-26 | End: 2024-11-29 | Stop reason: HOSPADM

## 2024-11-26 RX ORDER — IPRATROPIUM BROMIDE AND ALBUTEROL SULFATE 2.5; .5 MG/3ML; MG/3ML
3 SOLUTION RESPIRATORY (INHALATION) 3 TIMES DAILY
Status: DISCONTINUED | OUTPATIENT
Start: 2024-11-26 | End: 2024-11-29

## 2024-11-26 RX ORDER — IPRATROPIUM BROMIDE AND ALBUTEROL SULFATE 2.5; .5 MG/3ML; MG/3ML
3 SOLUTION RESPIRATORY (INHALATION) ONCE
Status: COMPLETED | OUTPATIENT
Start: 2024-11-26 | End: 2024-11-26

## 2024-11-26 RX ORDER — PANTOPRAZOLE SODIUM 40 MG/1
40 TABLET, DELAYED RELEASE ORAL
Status: DISCONTINUED | OUTPATIENT
Start: 2024-11-27 | End: 2024-11-29 | Stop reason: HOSPADM

## 2024-11-26 RX ORDER — ACETAMINOPHEN 160 MG/5ML
650 SOLUTION ORAL EVERY 4 HOURS PRN
Status: DISCONTINUED | OUTPATIENT
Start: 2024-11-26 | End: 2024-11-27

## 2024-11-26 RX ORDER — IPRATROPIUM BROMIDE AND ALBUTEROL SULFATE 2.5; .5 MG/3ML; MG/3ML
SOLUTION RESPIRATORY (INHALATION)
Status: COMPLETED
Start: 2024-11-26 | End: 2024-11-26

## 2024-11-26 RX ORDER — ALPRAZOLAM 0.25 MG/1
0.5 TABLET ORAL 3 TIMES DAILY PRN
Status: DISCONTINUED | OUTPATIENT
Start: 2024-11-26 | End: 2024-11-29 | Stop reason: HOSPADM

## 2024-11-26 RX ORDER — KETOROLAC TROMETHAMINE 15 MG/ML
15 INJECTION, SOLUTION INTRAMUSCULAR; INTRAVENOUS EVERY 6 HOURS PRN
Status: DISCONTINUED | OUTPATIENT
Start: 2024-11-26 | End: 2024-11-29 | Stop reason: HOSPADM

## 2024-11-26 RX ORDER — ZOLPIDEM TARTRATE 5 MG/1
10 TABLET ORAL NIGHTLY PRN
Status: DISCONTINUED | OUTPATIENT
Start: 2024-11-26 | End: 2024-11-29 | Stop reason: HOSPADM

## 2024-11-26 RX ORDER — ROPINIROLE 1 MG/1
4 TABLET, FILM COATED ORAL 2 TIMES DAILY
Status: DISCONTINUED | OUTPATIENT
Start: 2024-11-26 | End: 2024-11-29 | Stop reason: HOSPADM

## 2024-11-26 RX ORDER — TORSEMIDE 20 MG/1
40 TABLET ORAL 2 TIMES DAILY
Status: DISCONTINUED | OUTPATIENT
Start: 2024-11-26 | End: 2024-11-29 | Stop reason: HOSPADM

## 2024-11-26 SDOH — ECONOMIC STABILITY: FOOD INSECURITY: HOW HARD IS IT FOR YOU TO PAY FOR THE VERY BASICS LIKE FOOD, HOUSING, MEDICAL CARE, AND HEATING?: NOT HARD AT ALL

## 2024-11-26 SDOH — ECONOMIC STABILITY: TRANSPORTATION INSECURITY: IN THE PAST 12 MONTHS, HAS LACK OF TRANSPORTATION KEPT YOU FROM MEDICAL APPOINTMENTS OR FROM GETTING MEDICATIONS?: NO

## 2024-11-26 SDOH — SOCIAL STABILITY: SOCIAL INSECURITY: HAVE YOU HAD THOUGHTS OF HARMING ANYONE ELSE?: NO

## 2024-11-26 SDOH — SOCIAL STABILITY: SOCIAL INSECURITY: DO YOU FEEL ANYONE HAS EXPLOITED OR TAKEN ADVANTAGE OF YOU FINANCIALLY OR OF YOUR PERSONAL PROPERTY?: NO

## 2024-11-26 SDOH — SOCIAL STABILITY: SOCIAL INSECURITY: ARE YOU OR HAVE YOU BEEN THREATENED OR ABUSED PHYSICALLY, EMOTIONALLY, OR SEXUALLY BY ANYONE?: NO

## 2024-11-26 SDOH — ECONOMIC STABILITY: HOUSING INSECURITY: IN THE LAST 12 MONTHS, WAS THERE A TIME WHEN YOU WERE NOT ABLE TO PAY THE MORTGAGE OR RENT ON TIME?: NO

## 2024-11-26 SDOH — ECONOMIC STABILITY: FOOD INSECURITY: WITHIN THE PAST 12 MONTHS, THE FOOD YOU BOUGHT JUST DIDN'T LAST AND YOU DIDN'T HAVE MONEY TO GET MORE.: SOMETIMES TRUE

## 2024-11-26 SDOH — SOCIAL STABILITY: SOCIAL INSECURITY: WERE YOU ABLE TO COMPLETE ALL THE BEHAVIORAL HEALTH SCREENINGS?: YES

## 2024-11-26 SDOH — ECONOMIC STABILITY: INCOME INSECURITY: IN THE LAST 12 MONTHS, WAS THERE A TIME WHEN YOU WERE NOT ABLE TO PAY THE MORTGAGE OR RENT ON TIME?: NO

## 2024-11-26 SDOH — SOCIAL STABILITY: SOCIAL NETWORK: HOW OFTEN DO YOU GET TOGETHER WITH FRIENDS OR RELATIVES?: ONCE A WEEK

## 2024-11-26 SDOH — SOCIAL STABILITY: SOCIAL INSECURITY: ARE THERE ANY APPARENT SIGNS OF INJURIES/BEHAVIORS THAT COULD BE RELATED TO ABUSE/NEGLECT?: NO

## 2024-11-26 SDOH — SOCIAL STABILITY: SOCIAL INSECURITY: HAS ANYONE EVER THREATENED TO HURT YOUR FAMILY OR YOUR PETS?: NO

## 2024-11-26 SDOH — ECONOMIC STABILITY: TRANSPORTATION INSECURITY

## 2024-11-26 SDOH — ECONOMIC STABILITY: HOUSING INSECURITY: IN THE PAST 12 MONTHS, HOW MANY TIMES HAVE YOU MOVED WHERE YOU WERE LIVING?: 0

## 2024-11-26 SDOH — ECONOMIC STABILITY: HOUSING INSECURITY

## 2024-11-26 SDOH — ECONOMIC STABILITY: FOOD INSECURITY: WITHIN THE PAST 12 MONTHS, YOU WORRIED THAT YOUR FOOD WOULD RUN OUT BEFORE YOU GOT THE MONEY TO BUY MORE.: NEVER TRUE

## 2024-11-26 SDOH — SOCIAL STABILITY: SOCIAL INSECURITY: DO YOU FEEL UNSAFE GOING BACK TO THE PLACE WHERE YOU ARE LIVING?: NO

## 2024-11-26 SDOH — ECONOMIC STABILITY: FOOD INSECURITY: WITHIN THE PAST 12 MONTHS, THE FOOD YOU BOUGHT JUST DIDN'T LAST AND YOU DIDN'T HAVE MONEY TO GET MORE.: NEVER TRUE

## 2024-11-26 SDOH — ECONOMIC STABILITY: HOUSING INSECURITY: AT ANY TIME IN THE PAST 12 MONTHS, WERE YOU HOMELESS OR LIVING IN A SHELTER (INCLUDING NOW)?: NO

## 2024-11-26 SDOH — SOCIAL STABILITY: SOCIAL INSECURITY: ABUSE: ADULT

## 2024-11-26 SDOH — HEALTH STABILITY: MENTAL HEALTH: HOW MANY DRINKS CONTAINING ALCOHOL DO YOU HAVE ON A TYPICAL DAY WHEN YOU ARE DRINKING?: 1 OR 2

## 2024-11-26 SDOH — HEALTH STABILITY: MENTAL HEALTH: HOW OFTEN DO YOU HAVE SIX OR MORE DRINKS ON ONE OCCASION?: NEVER

## 2024-11-26 SDOH — ECONOMIC STABILITY: GENERAL

## 2024-11-26 SDOH — HEALTH STABILITY: MENTAL HEALTH: HOW OFTEN DO YOU HAVE A DRINK CONTAINING ALCOHOL?: MONTHLY OR LESS

## 2024-11-26 SDOH — SOCIAL STABILITY: SOCIAL INSECURITY: DOES ANYONE TRY TO KEEP YOU FROM HAVING/CONTACTING OTHER FRIENDS OR DOING THINGS OUTSIDE YOUR HOME?: NO

## 2024-11-26 SDOH — SOCIAL STABILITY: SOCIAL NETWORK: IN A TYPICAL WEEK, HOW MANY TIMES DO YOU TALK ON THE PHONE WITH FAMILY, FRIENDS, OR NEIGHBORS?: ONCE A WEEK

## 2024-11-26 SDOH — SOCIAL STABILITY: SOCIAL INSECURITY: HAVE YOU HAD ANY THOUGHTS OF HARMING ANYONE ELSE?: NO

## 2024-11-26 SDOH — ECONOMIC STABILITY: HOUSING INSECURITY: IN THE PAST 12 MONTHS HAS THE ELECTRIC, GAS, OIL, OR WATER COMPANY THREATENED TO SHUT OFF SERVICES IN YOUR HOME?: NO

## 2024-11-26 SDOH — SOCIAL STABILITY: SOCIAL INSECURITY: WITHIN THE LAST YEAR, HAVE YOU BEEN AFRAID OF YOUR PARTNER OR EX-PARTNER?: NO

## 2024-11-26 SDOH — ECONOMIC STABILITY: INCOME INSECURITY: IN THE PAST 12 MONTHS HAS THE ELECTRIC, GAS, OIL, OR WATER COMPANY THREATENED TO SHUT OFF SERVICES IN YOUR HOME?: NO

## 2024-11-26 SDOH — SOCIAL STABILITY: SOCIAL INSECURITY: WITHIN THE LAST YEAR, HAVE YOU BEEN HUMILIATED OR EMOTIONALLY ABUSED IN OTHER WAYS BY YOUR PARTNER OR EX-PARTNER?: NO

## 2024-11-26 SDOH — ECONOMIC STABILITY: FOOD INSECURITY: WITHIN THE PAST 12 MONTHS, YOU WORRIED THAT YOUR FOOD WOULD RUN OUT BEFORE YOU GOT MONEY TO BUY MORE.: OFTEN TRUE

## 2024-11-26 SDOH — SOCIAL STABILITY: SOCIAL NETWORK: HOW OFTEN DO YOU ATTEND MEETINGS OF THE CLUBS OR ORGANIZATIONS YOU BELONG TO?: NEVER

## 2024-11-26 SDOH — ECONOMIC STABILITY: TRANSPORTATION INSECURITY
IN THE PAST 12 MONTHS, HAS THE LACK OF TRANSPORTATION KEPT YOU FROM MEDICAL APPOINTMENTS OR FROM GETTING MEDICATIONS?: NO

## 2024-11-26 SDOH — ECONOMIC STABILITY: FOOD INSECURITY: WITHIN THE PAST 12 MONTHS, YOU WORRIED THAT YOUR FOOD WOULD RUN OUT BEFORE YOU GOT THE MONEY TO BUY MORE.: OFTEN TRUE

## 2024-11-26 SDOH — ECONOMIC STABILITY: TRANSPORTATION INSECURITY
IN THE PAST 12 MONTHS, HAS LACK OF TRANSPORTATION KEPT YOU FROM MEETINGS, WORK, OR FROM GETTING THINGS NEEDED FOR DAILY LIVING?: NO

## 2024-11-26 SDOH — SOCIAL STABILITY: SOCIAL NETWORK: HOW OFTEN DO YOU ATTEND CHURCH OR RELIGIOUS SERVICES?: NEVER

## 2024-11-26 SDOH — ECONOMIC STABILITY: FOOD INSECURITY

## 2024-11-26 ASSESSMENT — ACTIVITIES OF DAILY LIVING (ADL)
GROOMING: INDEPENDENT
ASSISTIVE_DEVICE: DENTURES PARTIAL;EYEGLASSES
TOILETING: INDEPENDENT
HEARING - RIGHT EAR: FUNCTIONAL
JUDGMENT_ADEQUATE_SAFELY_COMPLETE_DAILY_ACTIVITIES: YES
FEEDING YOURSELF: INDEPENDENT
DRESSING YOURSELF: INDEPENDENT
LACK_OF_TRANSPORTATION: NO
BATHING: INDEPENDENT
PATIENT'S MEMORY ADEQUATE TO SAFELY COMPLETE DAILY ACTIVITIES?: YES
LACK_OF_TRANSPORTATION: NO
HEARING - LEFT EAR: FUNCTIONAL
ADEQUATE_TO_COMPLETE_ADL: YES
WALKS IN HOME: INDEPENDENT

## 2024-11-26 ASSESSMENT — ENCOUNTER SYMPTOMS
PSYCHIATRIC NEGATIVE: 1
ENDOCRINE NEGATIVE: 1
FATIGUE: 1
NEUROLOGICAL NEGATIVE: 1
MUSCULOSKELETAL NEGATIVE: 1
HEMATOLOGIC/LYMPHATIC NEGATIVE: 1
GASTROINTESTINAL NEGATIVE: 1
FEVER: 1
EYES NEGATIVE: 1
SHORTNESS OF BREATH: 1
ALLERGIC/IMMUNOLOGIC NEGATIVE: 1
WHEEZING: 1
COUGH: 1

## 2024-11-26 ASSESSMENT — COGNITIVE AND FUNCTIONAL STATUS - GENERAL
DAILY ACTIVITIY SCORE: 24
MOBILITY SCORE: 24
PATIENT BASELINE BEDBOUND: NO

## 2024-11-26 ASSESSMENT — COLUMBIA-SUICIDE SEVERITY RATING SCALE - C-SSRS
2. HAVE YOU ACTUALLY HAD ANY THOUGHTS OF KILLING YOURSELF?: NO
6. HAVE YOU EVER DONE ANYTHING, STARTED TO DO ANYTHING, OR PREPARED TO DO ANYTHING TO END YOUR LIFE?: NO
1. IN THE PAST MONTH, HAVE YOU WISHED YOU WERE DEAD OR WISHED YOU COULD GO TO SLEEP AND NOT WAKE UP?: NO

## 2024-11-26 ASSESSMENT — PATIENT HEALTH QUESTIONNAIRE - PHQ9
SUM OF ALL RESPONSES TO PHQ9 QUESTIONS 1 & 2: 0
2. FEELING DOWN, DEPRESSED OR HOPELESS: NOT AT ALL
1. LITTLE INTEREST OR PLEASURE IN DOING THINGS: NOT AT ALL
2. FEELING DOWN, DEPRESSED OR HOPELESS: NOT AT ALL

## 2024-11-26 ASSESSMENT — PAIN DESCRIPTION - DESCRIPTORS: DESCRIPTORS: DISCOMFORT

## 2024-11-26 ASSESSMENT — LIFESTYLE VARIABLES
AUDIT-C TOTAL SCORE: 1
SKIP TO QUESTIONS 9-10: 1

## 2024-11-26 ASSESSMENT — SOCIAL DETERMINANTS OF HEALTH (SDOH): IN THE PAST 12 MONTHS, HAS THE ELECTRIC, GAS, OIL, OR WATER COMPANY THREATENED TO SHUT OFF SERVICE IN YOUR HOME?: NO

## 2024-11-26 ASSESSMENT — PAIN DESCRIPTION - PAIN TYPE: TYPE: ACUTE PAIN

## 2024-11-26 ASSESSMENT — PAIN SCALES - GENERAL
PAINLEVEL_OUTOF10: 3
PAINLEVEL_OUTOF10: 0 - NO PAIN

## 2024-11-26 ASSESSMENT — PAIN - FUNCTIONAL ASSESSMENT: PAIN_FUNCTIONAL_ASSESSMENT: 0-10

## 2024-11-26 ASSESSMENT — PAIN DESCRIPTION - LOCATION: LOCATION: THROAT

## 2024-11-26 NOTE — ED TRIAGE NOTES
Pt arrives to [facility] via EMS presenting with SOB. Pt states she was discharged yesterday from here, diagnosed with pneumonia, prescribed antibiotic and prednisone. Today, SOB worsening, mild discomfort in chest. Pt denies any N/V/D, fever and/or chills. Pt A&Ox4, resp labored, worse with exertion, pt hypoxic 93% on RA. Pt received 1 duoneb and 125mg solu medrol by EMS.

## 2024-11-26 NOTE — ED PROVIDER NOTES
HPI   Chief Complaint   Patient presents with   • Shortness of Breath       Patient is a 69-year-old female with significant PMH of COPD presents to the ED with cc of worsening shortness of breath times today.  Patient was just admitted to the hospital after having a week of symptoms fevers and shortness of breath.  Patient was discharged today after being diagnosed with pneumonia and was given IV antibiotics in house and discharged home with oral antibiotics.  Patient states at home she became more short of breath.  Patient does not normally wear oxygen and was discharged home without oxygen.  In the squad patient needed 2 L of oxygen was 94% on 2 L.  Patient denies any history of blood clots recent travel or surgery.  Patient is not on any blood thinners.  Patient denies any chest pain.  Patient denies any history of MIs.  Patient denies any history of CHF that she is aware of.  Patient does endorse increased edema in lower extremities.  Patient states she has had fever still and had ibuprofen an hour or 2 ago which brought down her fever.  Patient has a productive cough with pink sputum.  Patient does endorse some nausea.  Patient denies any vomiting abdominal pain diarrhea melena hematochezia.  Patient smokes 1 pack/day states she quit when she walked in here last week.  Patient denies any alcohol or street drug abuse.  Patient states she is scheduled for surgery for removal of the lung nodule but missed the appointment due to being in the hospital.              Patient History   Past Medical History:   Diagnosis Date   • Acute frontal sinusitis, unspecified 10/30/2014    Acute frontal sinusitis   • Other chronic diseases of tonsils and adenoids 06/03/2021    Tonsillar mass   • Other conditions influencing health status 10/12/2021    History of cough   • Other conditions influencing health status 04/16/2015    History of cough   • Personal history of other infectious and parasitic diseases 02/27/2014    History  of candidiasis of mouth     Past Surgical History:   Procedure Laterality Date   • CT GUIDED PERCUTANEOUS BIOPSY LUNG  7/8/2020    CT GUIDED PERCUTANEOUS BIOPSY LUNG 7/8/2020   • HYSTERECTOMY  05/09/2014    Hysterectomy   • LUMBAR LAMINECTOMY  05/09/2014    Laminectomy Lumbar   • US GUIDED FINE PERCUTANEOUS ASPIRATION  10/15/2020    US GUIDED FINE PERCUTANEOUS ASPIRATION 10/15/2020     Family History   Problem Relation Name Age of Onset   • Breast cancer Mother     • Breast cancer Sister     • Cancer Other Family History    • Breast cancer Mother's Sister       Social History     Tobacco Use   • Smoking status: Every Day     Current packs/day: 0.25     Average packs/day: 0.3 packs/day for 1.9 years (0.5 ttl pk-yrs)     Types: Cigarettes     Start date: 2023   • Smokeless tobacco: Never   • Tobacco comments:     5 cigarettes day, Hx of smoking before for a few months.   Vaping Use   • Vaping status: Never Used   Substance Use Topics   • Alcohol use: Not Currently   • Drug use: Never       Physical Exam   ED Triage Vitals [11/26/24 1502]   Temperature Heart Rate Respirations BP   36.6 °C (97.8 °F) 93 (!) 22 102/59      Pulse Ox Temp Source Heart Rate Source Patient Position   (!) 93 % Oral Monitor --      BP Location FiO2 (%)     -- --       Physical Exam  HENT:      Head: Normocephalic.   Cardiovascular:      Rate and Rhythm: Normal rate and regular rhythm.      Pulses: Normal pulses.   Pulmonary:      Effort: Pulmonary effort is normal.      Breath sounds: Decreased breath sounds and wheezing present.   Abdominal:      Palpations: Abdomen is soft.      Tenderness: There is no abdominal tenderness. There is no guarding or rebound.   Musculoskeletal:      Right lower leg: No edema.      Left lower leg: No edema.   Neurological:      General: No focal deficit present.      Mental Status: She is alert and oriented to person, place, and time.           ED Course & MDM   ED Course as of 12/13/24 1702   Fri Dec 13, 2024    1702 EKG interpretation performed at 1506 normal sinus rhythm normal axis no acute signs of ischemia ventricular rate 89 bpm. []      ED Course User Index  [] Nancy Nguyen PA-C         Diagnoses as of 12/13/24 1702   Shortness of breath   RSV (respiratory syncytial virus infection)   Pneumonia of both lungs due to infectious organism, unspecified part of lung                 No data recorded     [place] Coma Scale Score: 15 (11/26/24 1506 : Jarett Banuelos RN)                           Medical Decision Making  Medical Decision Making:  Patient presented as described in HPI. Patient case including ROS, PE, and treatment and plan discussed with ED attending if attached as cosigner. Due to patients presentation orders completed include as documented.  Patient presents to the ED with cc of shortness of breath worsening today.  Patient is nontoxic-appearing wheezing diminished, no peripheral edema abdomen is soft and nontender.  Pending labs and imaging.  Patient's troponin is 19 repeat is 5.  RSV is positive.  Flu COVID-negative.  BNP is elevated 208 previous was 38.  Patient's leukocytosis has decreased from 3 days prior leukocytosis at 11.8 today.  Rest of labs are unremarkable.  CT scan shows no gross PE worsening bilateral nodule micronodule infiltrates since 3 days prior.  Unchanged dominant irregular masslike opacity in the left lung base continued concern for malignancy.  Educated on these findings.  Patient needing admission.  I spoke to the hospitalist who accepts the patient.  Patient remained stable in the ER.  Patient was started on IV Levaquin.  Patient given DuoNeb here.        Patient care discussed with: N/A  Social Determinants affecting care: N/A    Final diagnosis and disposition as below.  See CI    Hospitalize. I discussed the differential; results and admit plan with the patient and/or family/friend/caregiver if present.  I emphasized the importance of hospitalization need for  re-evaluation/continued monitoring/interventions.. They agreed that if they feel their condition is worsening or if they have any other concern they should alert staff immediately for further assistance. I gave the patient an opportunity to ask all questions they had and answered all of them accordingly. The patient and/or family/friend/caregiver expressed understanding verbally and that they would comply.        Disposition:  admit    Hospitalize. Discussed findings and treatment done here in ED with admitting physician. It would be a risk to discharge the patient in their condition due to possibility of deterioration in their condition and the need for urgent interventions.    This note has been transcribed using voice recognition and may contain grammatical errors, misplaced words, incorrect words, incorrect phrases or other errors.        Labs Reviewed   RSV PCR - Abnormal       Result Value    RSV PCR Detected (*)     Narrative:     This assay is an FDA-cleared, in vitro diagnostic nucleic acid amplification test for the detection of RSV from nasopharyngeal specimens, and has been validated for use at [facility]. Negative results do not preclude RSV infections, and should not be used as the sole basis for diagnosis, treatment, or other management decisions. If Influenza A/B and RSV PCR results are negative, testing for Parainfluenza virus, Adenovirus and Metapneumovirus is routinely performed for pediatric oncology and intensive care inpatients at [facility], and is available on other patients by placing an add-on request.       CBC WITH AUTO DIFFERENTIAL - Abnormal    WBC 11.8 (*)     nRBC 0.0      RBC 4.65      Hemoglobin 13.5      Hematocrit 40.7      MCV 88      MCH 29.0      MCHC 33.2      RDW 13.8      Platelets 299      Neutrophils % 65.0      Immature Granulocytes %, Automated 2.7 (*)     Lymphocytes % 21.2      Monocytes % 9.6      Eosinophils % 1.0      Basophils % 0.5       Neutrophils Absolute 7.68      Immature Granulocytes Absolute, Automated 0.32      Lymphocytes Absolute 2.50      Monocytes Absolute 1.14 (*)     Eosinophils Absolute 0.12      Basophils Absolute 0.06     B-TYPE NATRIURETIC PEPTIDE - Abnormal     (*)     Narrative:        <100 pg/mL - Heart failure unlikely  100-299 pg/mL - Intermediate probability of acute heart                  failure exacerbation. Correlate with clinical                  context and patient history.    >=300 pg/mL - Heart Failure likely. Correlate with clinical                  context and patient history.    BNP testing is performed using different testing methodology at [facility] than at other [facility]. Direct result comparisons should only be made within the same method.      BLOOD GAS VENOUS FULL PANEL - Abnormal    POCT pH, Venous 7.42      POCT pCO2, Venous 38 (*)     POCT pO2, Venous 42      POCT SO2, Venous 68      POCT Oxy Hemoglobin, Venous 67.1      POCT Hematocrit Calculated, Venous 36.0      POCT Sodium, Venous 135 (*)     POCT Potassium, Venous 3.5      POCT Chloride, Venous 104      POCT Ionized [place]icum, Venous 1.22      POCT Glucose, Venous 96      POCT Lactate, Venous 1.7      POCT Base Excess, Venous 0.2      POCT HCO3 Calculated, Venous 24.6      POCT Hemoglobin, Venous 12.0      POCT Anion Gap, Venous 10.0      Patient Temperature        FiO2 28     SERIAL TROPONIN-INITIAL - Abnormal    Troponin I, High Sensitivity 19 (*)     Narrative:     Less than 99th percentile of normal range cutoff-  Female and children under 18 years old <14 ng/L; Male <21 ng/L: Negative  Repeat testing should be performed if clinically indicated.     Female and children under 18 years old 14-50 ng/L; Male 21-50 ng/L:  Consistent with possible cardiac damage and possible increased clinical   risk. Serial measurements may help to assess extent of myocardial damage.     >50 ng/L: Consistent with cardiac damage, increased  clinical risk and  myocardial infarction. Serial measurements may help assess extent of   myocardial damage.      NOTE: Children less than 1 year old may have higher baseline troponin   levels and results should be interpreted in conjunction with the overall   clinical context.     NOTE: Troponin I testing is performed using a different   testing methodology at [facility] than at [facility]. Direct result comparisons should only   be made within the same method.   SARS-COV-2 PCR - Normal    Coronavirus 2019, PCR Not Detected      Narrative:     This assay has received FDA Emergency Use Authorization (EUA) and is only authorized for the duration of time that circumstances exist to justify the authorization of the emergency use of in vitro diagnostic tests for the detection of SARS-CoV-2 virus and/or diagnosis of COVID-19 infection under section 564(b)(1) of the Act, 21 U.S.C. 360bbb-3(b)(1). This assay is an in vitro diagnostic nucleic acid amplification test for the qualitative detection of SARS-CoV-2 from nasopharyngeal specimens and has been validated for use at [facility]. Negative results do not preclude COVID-19 infections and should not be used as the sole basis for diagnosis, treatment, or other management decisions.     INFLUENZA A AND B PCR - Normal    Flu A Result Not Detected      Flu B Result Not Detected      Narrative:     This assay is an in vitro diagnostic multiplex nucleic acid amplification test for the detection and discrimination of Influenza A & B from nasopharyngeal specimens, and has been validated for use at [facility]. Negative results do not preclude Influenza A/B infections, and should not be used as the sole basis for diagnosis, treatment, or other management decisions. If Influenza A/B and RSV PCR results are negative, testing for Parainfluenza virus, Adenovirus and Metapneumovirus is routinely performed for [facility]  pediatric oncology and intensive care inpatients, and is available on other patients by placing an add-on request.   COMPREHENSIVE METABOLIC PANEL - Normal    Glucose 83      Sodium 139      Potassium 3.5      Chloride 105      Bicarbonate 24      Anion Gap 14      Urea Nitrogen 18      Creatinine 0.89      eGFR 70      Calcium 9.0      Albumin 3.9      Alkaline Phosphatase 99      Total Protein 7.3      AST 22      Bilirubin, Total 0.3      ALT 28     MAGNESIUM - Normal    Magnesium 1.89     LACTATE - Normal    Lactate 1.5      Narrative:     Venipuncture immediately after or during the administration of Metamizole may lead to falsely low results. Testing should be performed immediately prior to Metamizole dosing.   SERIAL TROPONIN, 1 HOUR - Normal    Troponin I, High Sensitivity 5      Narrative:     Less than 99th percentile of normal range cutoff-  Female and children under 18 years old <14 ng/L; Male <21 ng/L: Negative  Repeat testing should be performed if clinically indicated.     Female and children under 18 years old 14-50 ng/L; Male 21-50 ng/L:  Consistent with possible cardiac damage and possible increased clinical   risk. Serial measurements may help to assess extent of myocardial damage.     >50 ng/L: Consistent with cardiac damage, increased clinical risk and  myocardial infarction. Serial measurements may help assess extent of   myocardial damage.      NOTE: Children less than 1 year old may have higher baseline troponin   levels and results should be interpreted in conjunction with the overall   clinical context.     NOTE: Troponin I testing is performed using a different   testing methodology at [facility] than at other   [facility]. Direct result comparisons should only   be made within the same method.   TROPONIN SERIES- (INITIAL, 1 HR)    Narrative:     The following orders were created for panel order Troponin I Series, High Sensitivity (0, 1 HR).  Procedure                                Abnormality         Status                     ---------                               -----------         ------                     Troponin I, High Sensiti...[[id number]]  Abnormal            Final result               Troponin, High Sensitivi...[[id number]]  Normal              Final result                 Please view results for these tests on the individual orders.      CT angio chest for pulmonary embolism   Final Result   No gross central PE however limited assessment for peripheral PE due   to technical factors.        Slight worsening of bilateral nodular/micronodular infiltrates since   3 days prior. Unchanged dominant irregular masslike opacity in the   left lung base, reportedly hypermetabolic on prior PET scan and   concerning for malignancy, and mild lymphadenopathy. Further   evaluation/continued follow-up recommended.        MACRO:   None.        Signed by: Sunni Newsome 11/26/2024 4:50 PM   Dictation workstation:   [id number]           Procedure  Procedures     Nancy Nguyen PA-C  11/26/24 1722       Nancy Nguyen PA-C  12/13/24 1706

## 2024-11-26 NOTE — TELEPHONE ENCOUNTER
Call placed to 911 per  Chantelle's request to her  home for increase of SOB .  I was on the phone with Chantelle's friend  until tj arrived

## 2024-11-26 NOTE — PROGRESS NOTES
SAMARA RN notified me that patient continued to have significant shortness of breath and contemplated returning to ER.    I called patient to evaluate and patient is unable to speak in complete sentences, very short of breath with a barking cough and wheezing that I could hear over the phone.     I recommend patient go to ER and my office will reach out to the EMS team for transport from her home.

## 2024-11-26 NOTE — H&P
History Of Present Illness  Chantelle Rao is a 69 y.o. female with PMH of COPD, left lower lobe nodule found in the past month, GERD, iron deficiency anemia, insomnia, chronic back pain, anxiety and restless leg syndrome who presented to the ED with worsening shortness of breath today. She was discharged Monday after admission for cough, fever and shortness of breath. She was given oral antibiotics and steroids to take at home after her symptoms improved. She woke up today feeling worse and called her PCP, then went to ED. In the ED her workup showed , troponin 19 > 5, WBC 11.8, RSV positive. VBG 7.42/38/42/24.6. CT PE showed no PE, worsening bilateral nodular infiltrates. She was started on IV levaquin and steroids and admitted for further evaluation and treatment.     Past Medical History  Past Medical History:   Diagnosis Date    Acute frontal sinusitis, unspecified 10/30/2014    Anemia     COPD (chronic obstructive pulmonary disease) (Multi)     Disease of thyroid gland     GERD (gastroesophageal reflux disease)     Other chronic diseases of tonsils and adenoids 06/03/2021    Tonsillar mass    RLS (restless legs syndrome)      Surgical History  Past Surgical History:   Procedure Laterality Date    CT GUIDED PERCUTANEOUS BIOPSY LUNG  07/08/2020    HYSTERECTOMY  05/09/2014    LUMBAR LAMINECTOMY  05/09/2014    US GUIDED FINE PERCUTANEOUS ASPIRATION  10/15/2020      Social History  She reports that she has been smoking cigarettes. She started smoking about 22 months ago. She has a 0.5 pack-year smoking history. She has never used smokeless tobacco. She reports that she does not currently use alcohol. She reports that she does not use drugs.    Family History  Family History   Problem Relation Name Age of Onset    Breast cancer Mother      Breast cancer Sister      Cancer Other Family History     Breast cancer Mother's Sister        Allergies  Patient has no known allergies.    Review of Systems    Constitutional:  Positive for fatigue and fever.   HENT:  Positive for congestion.    Eyes: Negative.    Respiratory:  Positive for cough, shortness of breath and wheezing.    Cardiovascular:  Positive for chest pain.   Gastrointestinal: Negative.    Endocrine: Negative.    Genitourinary: Negative.    Musculoskeletal: Negative.    Allergic/Immunologic: Negative.    Neurological: Negative.    Hematological: Negative.    Psychiatric/Behavioral: Negative.        Physical Exam  Constitutional:       General: She is not in acute distress.     Appearance: Normal appearance. She is not toxic-appearing.   HENT:      Head: Normocephalic and atraumatic.      Mouth/Throat:      Mouth: Mucous membranes are moist.   Eyes:      Extraocular Movements: Extraocular movements intact.      Pupils: Pupils are equal, round, and reactive to light.   Cardiovascular:      Rate and Rhythm: Normal rate and regular rhythm.      Pulses: Normal pulses.      Heart sounds: Normal heart sounds. No murmur heard.     No gallop.   Pulmonary:      Effort: Pulmonary effort is normal. No respiratory distress.      Breath sounds: Wheezing and rhonchi present. No rales.   Abdominal:      General: Bowel sounds are normal. There is no distension.      Palpations: Abdomen is soft.      Tenderness: There is no abdominal tenderness. There is no guarding or rebound.   Musculoskeletal:         General: No swelling, tenderness, deformity or signs of injury. Normal range of motion.      Cervical back: Normal range of motion and neck supple.   Skin:     General: Skin is warm and dry.      Capillary Refill: Capillary refill takes less than 2 seconds.      Coloration: Skin is not jaundiced.      Findings: No bruising or rash.   Neurological:      General: No focal deficit present.      Mental Status: She is alert and oriented to person, place, and time.      Cranial Nerves: No cranial nerve deficit.      Sensory: No sensory deficit.      Motor: No weakness.       "Gait: Gait normal.   Psychiatric:         Mood and Affect: Mood normal.         Behavior: Behavior normal.         Thought Content: Thought content normal.         Judgment: Judgment normal.      Last Recorded Vitals  Blood pressure 95/60, pulse 93, temperature 36.6 °C (97.8 °F), temperature source Oral, resp. rate (!) 25, height 1.626 m (5' 4\"), weight 72.6 kg (160 lb), SpO2 95%.    Scheduled medications  enoxaparin, 40 mg, subcutaneous, q24h  ipratropium-albuteroL, 3 mL, nebulization, TID  levoFLOXacin, 500 mg, intravenous, q24h  levothyroxine, 25 mcg, oral, Daily before breakfast  methylPREDNISolone sodium succinate (PF), 40 mg, intravenous, q8h  pantoprazole, 40 mg, oral, Daily before breakfast   Or  pantoprazole, 40 mg, intravenous, Daily before breakfast  polyethylene glycol, 17 g, oral, Daily  rOPINIRole, 4 mg, oral, BID  torsemide, 40 mg, oral, BID     PRN medications  PRN medications: acetaminophen **OR** acetaminophen **OR** acetaminophen, acetaminophen **OR** acetaminophen **OR** acetaminophen, albuterol, ALPRAZolam, benzocaine-menthol, dextromethorphan-guaifenesin, guaiFENesin, ketorolac, melatonin, ondansetron ODT **OR** ondansetron, oxygen, zolpidem    Relevant Results  CT angio chest for pulmonary embolism  Result Date: 11/26/2024  No gross central PE however limited assessment for peripheral PE due to technical factors.   Slight worsening of bilateral nodular/micronodular infiltrates since 3 days prior. Unchanged dominant irregular masslike opacity in the left lung base, reportedly hypermetabolic on prior PET scan and concerning for malignancy, and mild lymphadenopathy. Further evaluation/continued follow-up recommended.   MACRO: None.   Signed by: Sunni Newsome 11/26/2024 4:50 PM Dictation workstation:   [id number]    ECG 12 lead  Result Date: 11/26/2024  Sinus tachycardia Nonspecific ST and T wave abnormality Abnormal ECG When compared with ECG of 23-NOV-2024 16:33, (unconfirmed) No significant " change was found See ED provider note for full interpretation and clinical correlation Confirmed by Lashawn Lancaster ([id number]) on 11/26/2024 4:33:51 PM    Complete Pulmonary Function Test Pre/Post Bronchodialator (Spirometry Pre/Post/DLCO/Lung Volumes)  Result Date: 11/25/2024  Spirometry shows severe obstruction with small airways bronchodilator response. Lung volumes show air trapping. Diffusing capacity is normal.     Latest Reference Range & Units 11/26/24 15:11   GLUCOSE 74 - 99 mg/dL 83   SODIUM 136 - 145 mmol/L 139   POTASSIUM 3.5 - 5.3 mmol/L 3.5   CHLORIDE 98 - 107 mmol/L 105   Bicarbonate 21 - 32 mmol/L 24   Anion Gap 10 - 20 mmol/L 14   Blood Urea Nitrogen 6 - 23 mg/dL 18   Creatinine 0.50 - 1.05 mg/dL 0.89   EGFR >60 mL/min/1.73m*2 70   Calcium 8.6 - 10.3 mg/dL 9.0   Albumin 3.4 - 5.0 g/dL 3.9   Alkaline Phosphatase 33 - 136 U/L 99   ALT 7 - 45 U/L 28   AST 9 - 39 U/L 22   Bilirubin Total 0.0 - 1.2 mg/dL 0.3   Total Protein 6.4 - 8.2 g/dL 7.3   MAGNESIUM 1.60 - 2.40 mg/dL 1.89   Lactate 0.4 - 2.0 mmol/L 1.5   BNP 0 - 99 pg/mL 208 (H)   Troponin I, High Sensitivity 0 - 13 ng/L 19 (H)   WBC 4.4 - 11.3 x10*3/uL 11.8 (H)   nRBC 0.0 - 0.0 /100 WBCs 0.0   RBC 4.00 - 5.20 x10*6/uL 4.65   HEMOGLOBIN 12.0 - 16.0 g/dL 13.5   HEMATOCRIT 36.0 - 46.0 % 40.7   MCV 80 - 100 fL 88   MCH 26.0 - 34.0 pg 29.0   MCHC 32.0 - 36.0 g/dL 33.2   RED CELL DISTRIBUTION WIDTH 11.5 - 14.5 % 13.8   Platelets 150 - 450 x10*3/uL 299     Assessment/Plan   Assessment & Plan  Anxiety    RSV (acute bronchiolitis due to respiratory syncytial virus)    Hypothyroidism    RSV infection    RLS (restless legs syndrome)    Tobacco use    Wheezing    RSV (acute bronchiolitis due to respiratory syncytial virus)  Wheezing  Tobacco use  COPD  - discharged home, worsened the next day, severe wheezing, sore throat, barking cough  - PFT: Spirometry shows severe obstruction with small airways bronchodilator response. Lung volumes show air trapping. Diffusing  capacity is normal.  - CT PE: No gross central PE however limited assessment for peripheral PE due to technical factors.   Slight worsening of bilateral nodular/micronodular infiltrates since 3 days prior. Unchanged dominant irregular masslike opacity in the left lung base, reportedly hypermetabolic on prior PET scan and concerning for malignancy, and mild lymphadenopathy. Further evaluation/continued follow-up recommended.   - WBC 11.8  - VB.42/38/42/24.6  - continue duonebs  - continue IV solumedrol  - continue IV levaquin, day 1  - RT consult, bronchial hygiene  - ID consult, recs appreciated  - supportive care    HLD  Angina  Aortic Insufficiency, severe  - Echo 2023: 1. Left ventricular systolic function is normal with a 55-60% estimated ejection fraction.  2. Spectral Doppler shows an impaired relaxation pattern of left ventricular diastolic filling. 3. Moderate to severe aortic valve regurgitation. 4. There is mild mitral and tricuspid regurgitation.  5. The estimated pulmonary artery pressure is mildly elevated with the RVSP at 42.5 mmHg.  -   - troponin 19 > 5  - sees Dr Dela Cruz  - continue torsemide    Hypothyroidism  - continue levothyroxine    RLS (restless legs syndrome)  - continue ropinirole    Anxiety  - continue alprazolam as needed    GI ppx: PPI  DVT ppx: enoxaparin  Fluids: prn  Electrolytes: replace as needed  Nutrition: regular  Adjuncts: PIV  Code Status: full  Pt requires inpatient stay at this time.    SEDA Strong-CNP  :

## 2024-11-26 NOTE — DISCHARGE SUMMARY
Discharge Diagnosis  Shortness of breath    Issues Requiring Follow-Up  Interventional Radiology appt for biopsy    Discharge Meds     Medication List      START taking these medications     azithromycin 500 mg tablet; Commonly known as: Zithromax; Take 1 tablet   (500 mg) by mouth once every 24 hours for 1 day.   cefuroxime 500 mg tablet; Commonly known as: Ceftin; Take 1 tablet (500   mg) by mouth 2 times a day for 7 days.   predniSONE 10 mg tablet; Commonly known as: Deltasone; Take 3 tablets   (30 mg) by mouth once daily for 3 days, THEN 2 tablets (20 mg) once daily   for 3 days, THEN 1 tablet (10 mg) once daily for 3 days.; Start taking on:   November 25, 2024     CONTINUE taking these medications     albuterol 90 mcg/actuation inhaler; Commonly known as: Ventolin HFA;   Inhale 2 puffs every 4 hours if needed for wheezing or shortness of   breath.   ALPRAZolam 0.5 mg tablet; Commonly known as: Xanax; Take 1 tablet (0.5   mg) by mouth 3 times a day as needed for anxiety.   levothyroxine 25 mcg tablet; Commonly known as: Synthroid, Levoxyl; Take   1 tablet (25 mcg) by mouth once daily in the morning. Take before meals.   ondansetron 4 mg tablet; Commonly known as: Zofran   ondansetron ODT 4 mg disintegrating tablet; Commonly known as:   Zofran-ODT; Take 1 tablet (4 mg) by mouth every 8 hours if needed for   nausea or vomiting.   rOPINIRole 4 mg tablet; Commonly known as: Requip; Take 1 tablet (4 mg)   by mouth 2 times a day.   torsemide 20 mg tablet; Commonly known as: Demadex   zolpidem 10 mg tablet; Commonly known as: Ambien; Take 1 tablet (10 mg)   by mouth as needed at bedtime for sleep.       Test Results Pending At Discharge  Pending Labs       Order Current Status    Extra Urine Gray Tube Collected (11/23/24 2212)    Urinalysis with Reflex Culture and Microscopic In process    Blood Culture Preliminary result    Blood Culture Preliminary result            Hospital Course   Chantelle Rao is a 69 y.o. female  with COPD, suspected metastatic lung nodule who was admitted with fever, shortness of breath after presenting to the ED with the same symptoms. She was scheduled for biopsy on 11/26 for a lung nodule found recently. She is currently a smoker. She was treated with IV azithromycin and ceftriaxone, IV steroids and bronchodilators. Her low potassium was also treated with oral repletion. She was discharged home after improvement with azithromycin and cefuroxime orally, oral prednisone taper. After speaking with Dr Larkin, she was informed that his team would reach out to her to reschedule her lung biopsy.     Problem List:   #Lung nodules concerning for malignancy  #Shortness of breath  #Tobacco use  #Fever, resolved   - [place] [facility] [place]. No previous diagnosis of COPD, still smokes cigarettes  - CTA chest for PE: Suboptimal low-dose imaging technique limits evaluation. No significant central pulmonary embolism. Left lung base mass again noted consistent with known/suspected hypermetabolic malignancy. Correlate with patient history.   - Solumedrol 125 mg IV x 2 doses given on admission   - TMax 38.7C, afebrile today   - Trop 4 > 4   - BNP 56  - WBC 17.5 > 15.3  - Flu, COVID negative   - Procal, BCx, sputum culture pending   - Azithromycin and ceftriaxone started on admission; will continue in setting of leukocytosis and fever on admission   - Start Solumedrol 40 mg IV q8h given suspected COPD   - Nicotine patch ordered; encourage cessation on discharge  - DuoNebs TID  - RT eval/treat protocol  - Tylenol PRN for fever   - Monitor SpO2 continuously; remains on room air   - Dr. Larkin offline, unable to notify him of patient admission on 11/24   ---oral abx continued, oral steroid taper continued  ---Follow up as needed with PCP     #Headache, improving  #Hypokalemia, resolved  #Hypothyroidism  #Restless leg syndrome  #Anxiety  ---Continue all chronic  meds  ---Follow up as needed with PCP       Pertinent Physical Exam At Time of Discharge  Physical Exam  Constitutional:       General: She is not in acute distress.     Appearance: She is not toxic-appearing.   HENT:      Head: Normocephalic and atraumatic.      Mouth/Throat:      Mouth: Mucous membranes are moist.   Eyes:      Conjunctiva/sclera: Conjunctivae normal.   Cardiovascular:      Rate and Rhythm: Normal rate and regular rhythm.   Pulmonary:      Effort: No respiratory distress.      Breath sounds: Normal breath sounds. No wheezing or rales.      Comments: On room air   Abdominal:      General: There is no distension.      Palpations: Abdomen is soft.      Tenderness: There is no abdominal tenderness.   Musculoskeletal:         General: No swelling.   Skin:     General: Skin is warm and dry.   Neurological:      Mental Status: She is alert and oriented to person, place, and time.      Motor: No weakness.   Psychiatric:         Mood and Affect: Mood normal.         Behavior: Behavior normal.     Stable for discharge.  Total cumulative time spent in preparation of this discharge including documentation review, coordination of care with the medical team including PT/SW/care coordinators and treating consultants, discussion with patient and pertinent family members and finalization of prescriptions, follow-up appointments, and this discharge summary was approximately 45 minutes.    Outpatient Follow-Up  Future Appointments   Date Time Provider Department [facility]   12/9/2024  1:45 PM Susan L Mayne, APRN-CNP [id number] [facility]   1/8/2025 11:20 AM Kavitha Medina MD [id number] [facility]         [facility]

## 2024-11-27 ENCOUNTER — APPOINTMENT (OUTPATIENT)
Dept: HEMATOLOGY/ONCOLOGY | Facility: HOSPITAL | Age: 69
End: 2024-11-27
Payer: MEDICARE

## 2024-11-27 LAB
ANION GAP SERPL CALC-SCNC: 16 MMOL/L (ref 10–20)
ATRIAL RATE: 89 BPM
BUN SERPL-MCNC: 17 MG/DL (ref 6–23)
CALCIUM SERPL-MCNC: 9.1 MG/DL (ref 8.6–10.3)
CHLORIDE SERPL-SCNC: 100 MMOL/L (ref 98–107)
CO2 SERPL-SCNC: 28 MMOL/L (ref 21–32)
CREAT SERPL-MCNC: 0.88 MG/DL (ref 0.5–1.05)
EGFRCR SERPLBLD CKD-EPI 2021: 71 ML/MIN/1.73M*2
ERYTHROCYTE [DISTWIDTH] IN BLOOD BY AUTOMATED COUNT: 13.6 % (ref 11.5–14.5)
GLUCOSE SERPL-MCNC: 133 MG/DL (ref 74–99)
HCT VFR BLD AUTO: 39.2 % (ref 36–46)
HGB BLD-MCNC: 12.7 G/DL (ref 12–16)
MCH RBC QN AUTO: 28.5 PG (ref 26–34)
MCHC RBC AUTO-ENTMCNC: 32.4 G/DL (ref 32–36)
MCV RBC AUTO: 88 FL (ref 80–100)
MRSA DNA SPEC QL NAA+PROBE: NOT DETECTED
NRBC BLD-RTO: 0 /100 WBCS (ref 0–0)
P AXIS: 46 DEGREES
P OFFSET: 213 MS
P ONSET: 165 MS
PLATELET # BLD AUTO: 286 X10*3/UL (ref 150–450)
POTASSIUM SERPL-SCNC: 3.9 MMOL/L (ref 3.5–5.3)
PR INTERVAL: 114 MS
PROCALCITONIN SERPL-MCNC: 0.03 NG/ML
Q ONSET: 222 MS
QRS COUNT: 15 BEATS
QRS DURATION: 88 MS
QT INTERVAL: 382 MS
QTC CALCULATION(BAZETT): 464 MS
QTC FREDERICIA: 435 MS
R AXIS: 76 DEGREES
RBC # BLD AUTO: 4.46 X10*6/UL (ref 4–5.2)
SODIUM SERPL-SCNC: 140 MMOL/L (ref 136–145)
T AXIS: 37 DEGREES
T OFFSET: 413 MS
VENTRICULAR RATE: 89 BPM
WBC # BLD AUTO: 9.7 X10*3/UL (ref 4.4–11.3)

## 2024-11-27 PROCEDURE — 96375 TX/PRO/DX INJ NEW DRUG ADDON: CPT

## 2024-11-27 PROCEDURE — 2500000001 HC RX 250 WO HCPCS SELF ADMINISTERED DRUGS (ALT 637 FOR MEDICARE OP)

## 2024-11-27 PROCEDURE — 82374 ASSAY BLOOD CARBON DIOXIDE: CPT

## 2024-11-27 PROCEDURE — 85027 COMPLETE CBC AUTOMATED: CPT

## 2024-11-27 PROCEDURE — 96376 TX/PRO/DX INJ SAME DRUG ADON: CPT

## 2024-11-27 PROCEDURE — 36415 COLL VENOUS BLD VENIPUNCTURE: CPT

## 2024-11-27 PROCEDURE — 84145 PROCALCITONIN (PCT): CPT | Mod: GENLAB

## 2024-11-27 PROCEDURE — 87449 NOS EACH ORGANISM AG IA: CPT | Mod: GENLAB | Performed by: INTERNAL MEDICINE

## 2024-11-27 PROCEDURE — 86738 MYCOPLASMA ANTIBODY: CPT | Performed by: INTERNAL MEDICINE

## 2024-11-27 PROCEDURE — 2500000004 HC RX 250 GENERAL PHARMACY W/ HCPCS (ALT 636 FOR OP/ED)

## 2024-11-27 PROCEDURE — 99232 SBSQ HOSP IP/OBS MODERATE 35: CPT

## 2024-11-27 PROCEDURE — 1200000002 HC GENERAL ROOM WITH TELEMETRY DAILY: Mod: IPSPLIT

## 2024-11-27 PROCEDURE — 94760 N-INVAS EAR/PLS OXIMETRY 1: CPT | Mod: IPSPLIT

## 2024-11-27 PROCEDURE — 2500000005 HC RX 250 GENERAL PHARMACY W/O HCPCS: Mod: IPSPLIT

## 2024-11-27 PROCEDURE — 2500000002 HC RX 250 W HCPCS SELF ADMINISTERED DRUGS (ALT 637 FOR MEDICARE OP, ALT 636 FOR OP/ED): Mod: MUE | Performed by: NURSE PRACTITIONER

## 2024-11-27 PROCEDURE — 87641 MR-STAPH DNA AMP PROBE: CPT | Mod: IPSPLIT

## 2024-11-27 PROCEDURE — 36415 COLL VENOUS BLD VENIPUNCTURE: CPT | Mod: IPSPLIT

## 2024-11-27 PROCEDURE — 2500000001 HC RX 250 WO HCPCS SELF ADMINISTERED DRUGS (ALT 637 FOR MEDICARE OP): Performed by: STUDENT IN AN ORGANIZED HEALTH CARE EDUCATION/TRAINING PROGRAM

## 2024-11-27 PROCEDURE — 94640 AIRWAY INHALATION TREATMENT: CPT | Mod: IPSPLIT

## 2024-11-27 PROCEDURE — 2500000002 HC RX 250 W HCPCS SELF ADMINISTERED DRUGS (ALT 637 FOR MEDICARE OP, ALT 636 FOR OP/ED): Mod: IPSPLIT | Performed by: STUDENT IN AN ORGANIZED HEALTH CARE EDUCATION/TRAINING PROGRAM

## 2024-11-27 PROCEDURE — 87899 AGENT NOS ASSAY W/OPTIC: CPT | Mod: GENLAB | Performed by: INTERNAL MEDICINE

## 2024-11-27 RX ORDER — VANCOMYCIN HYDROCHLORIDE 1 G/20ML
INJECTION, POWDER, LYOPHILIZED, FOR SOLUTION INTRAVENOUS DAILY PRN
Status: DISCONTINUED | OUTPATIENT
Start: 2024-11-27 | End: 2024-11-27

## 2024-11-27 RX ORDER — VANCOMYCIN 1.25 G/250ML
1750 INJECTION, SOLUTION INTRAVENOUS EVERY 24 HOURS
Status: DISCONTINUED | OUTPATIENT
Start: 2024-11-27 | End: 2024-11-27

## 2024-11-27 RX ORDER — BENZONATATE 100 MG/1
100 CAPSULE ORAL 3 TIMES DAILY
Status: DISCONTINUED | OUTPATIENT
Start: 2024-11-27 | End: 2024-11-29 | Stop reason: HOSPADM

## 2024-11-27 ASSESSMENT — ENCOUNTER SYMPTOMS
CHILLS: 0
NAUSEA: 0
VOMITING: 0
FATIGUE: 1
DIARRHEA: 0
COUGH: 1
SHORTNESS OF BREATH: 1

## 2024-11-27 ASSESSMENT — PAIN SCALES - GENERAL
PAINLEVEL_OUTOF10: 0 - NO PAIN
PAINLEVEL_OUTOF10: 0 - NO PAIN

## 2024-11-27 ASSESSMENT — ACTIVITIES OF DAILY LIVING (ADL): LACK_OF_TRANSPORTATION: NO

## 2024-11-27 NOTE — PROGRESS NOTES
Pharmacy Medication History Review    Chantelle Rao is a 69 y.o. female admitted for RSV infection. Pharmacy reviewed the patient's iatdb-in-ybqivghid medications and allergies for accuracy.  Sources used to confirm medication list: Informant interview  Informant: Patient  Informant credibility: Excellent (Able to recall medication, indication, strength, frequency, and/or prescriber for >90% of medications).    Total number of adjustments: 1     Medications Added Medications Removed Medications Adjusted  Adjustments Made    Zofran (duplicate)       The list below reflectives the updated PTA list. Please review each medication in order reconciliation for additional clarification and justification.  Medications Prior to Admission   Medication Sig Dispense Refill Last Dose/Taking    albuterol (Ventolin HFA) 90 mcg/actuation inhaler Inhale 2 puffs every 4 hours if needed for wheezing or shortness of breath. 8 g 5 Unknown    ALPRAZolam (Xanax) 0.5 mg tablet Take 1 tablet (0.5 mg) by mouth 3 times a day as needed for anxiety. 90 tablet 0     azithromycin (Zithromax) 500 mg tablet Take 1 tablet (500 mg) by mouth once every 24 hours for 1 day. 1 tablet 0     cefuroxime (Ceftin) 500 mg tablet Take 1 tablet (500 mg) by mouth 2 times a day for 7 days. 14 tablet 0     levothyroxine (Synthroid, Levoxyl) 25 mcg tablet Take 1 tablet (25 mcg) by mouth once daily in the morning. Take before meals. 90 tablet 2     ondansetron ODT (Zofran-ODT) 4 mg disintegrating tablet Take 1 tablet (4 mg) by mouth every 8 hours if needed for nausea or vomiting. 90 tablet 0     predniSONE (Deltasone) 10 mg tablet Take 3 tablets (30 mg) by mouth once daily for 3 days, THEN 2 tablets (20 mg) once daily for 3 days, THEN 1 tablet (10 mg) once daily for 3 days. 18 tablet 0     rOPINIRole (Requip) 4 mg tablet Take 1 tablet (4 mg) by mouth 2 times a day. 180 tablet 0     torsemide (Demadex) 20 mg tablet Take 2 tablets (40 mg) by mouth 2 times a day.        zolpidem (Ambien) 10 mg tablet Take 1 tablet (10 mg) by mouth as needed at bedtime for sleep. 30 tablet 0         The list below reflectives the updated allergy list. Please review each documented allergy for additional clarification and justification.  Allergies  Reviewed by Jarett Banuelos RN on 11/26/2024   No Known Allergies         Below are additional concerns with the patient's PTA list.  Spoke w/pt at bedside. Reviewed PTA and allergy list. When questioned about if she finished the azithromycin therapy, pt stated that she does not remember picking up or taking the single pill. Zofran tablet removed from PTA list due to being a duplicate order.    Kami Mcgrath CPhT  Medication History Pharmacy Technician  RALF 8-4:30  Available via [employer] Secure Chat  OR  [phone]

## 2024-11-27 NOTE — CONSULTS
Vancomycin Dosing by Pharmacy- INITIAL    Chantelle Rao is a 69 y.o. year old female who Pharmacy has been consulted for vancomycin dosing for pneumonia. Based on the patient's indication and renal status this patient will be dosed based on a goal AUC of 400-600.     Renal function is currently stable.    Visit Vitals  /60 (BP Location: Left arm, Patient Position: Lying)   Pulse 91   Temp 36.6 °C (97.9 °F) (Temporal)   Resp 18        Lab Results   Component Value Date    CREATININE 0.88 2024    CREATININE 0.89 2024    CREATININE 0.72 2024    CREATININE 0.70 2024        Patient weight is as follows:   Vitals:    24 1900   Weight: 74.7 kg (164 lb 10.9 oz)       Cultures:  No results found for the encounter in last 14 days.        I/O last 3 completed shifts:  In: - (0 mL/kg)   Out: 1200 (16.1 mL/kg) [Urine:1200 (0.4 mL/kg/hr)]  Weight: 74.7 kg   I/O during current shift:  No intake/output data recorded.    Temp (24hrs), Av.7 °C (98 °F), Min:36.6 °C (97.8 °F), Max:36.7 °C (98.1 °F)         Assessment/Plan     Patient will not be given a loading dose.  Will initiate vancomycin maintenance,  1750 mg every 24 hours.    This dosing regimen is predicted by [employer] to result in the following pharmacokinetic parameters:  Regimen: 1750 mg IV every 24 hours.  Start time: 09:34 on 2024  Exposure target: AUC24 (range)400-600 mg/L.hr   [id number]: 442 mg/L.hr  AUC24,ss: 553 mg/L.hr  Probability of AUC24 > 400: 84 %  Ctrough,ss: 15.4 mg/L  Probability of Ctrough,ss > 20: 26 %    Follow-up level will be ordered on 24 at 0500 unless clinically indicated sooner.  Will continue to monitor renal function daily while on vancomycin and order serum creatinine at least every 48 hours if not already ordered.  Follow for continued vancomycin needs, clinical response, and signs/symptoms of toxicity.       Marvin Jackson, PharmD

## 2024-11-27 NOTE — CARE PLAN
The patient's goals for the shift include      The clinical goals for the shift include SpO2 > 92%      Problem: Fall/Injury  Goal: Not fall by end of shift  Outcome: Progressing  Goal: Be free from injury by end of the shift  Outcome: Progressing  Goal: Verbalize understanding of personal risk factors for fall in the hospital  Outcome: Progressing  Goal: Verbalize understanding of risk factor reduction measures to prevent injury from fall in the home  Outcome: Progressing  Goal: Use assistive devices by end of the shift  Outcome: Progressing  Goal: Pace activities to prevent fatigue by end of the shift  Outcome: Progressing

## 2024-11-27 NOTE — PROGRESS NOTES
11/27/24 1232   Discharge Planning   Living Arrangements Alone   Support Systems Children;Friends/neighbors   Assistance Needed Patient alert and oriented; Patient lives in an apartment alone. She has to climb the stairs to get to her floor. She says she is independent with ADLs, IADLs, ambulation and drives.  No DME and no home oxygen.   Type of Residence Private residence   Number of Stairs to Enter Residence 14   Number of Stairs Within Residence 0   Home or Post Acute Services None   Expected Discharge Disposition Home   Does the patient need discharge transport arranged? No   Financial Resource Strain   How hard is it for you to pay for the very basics like food, housing, medical care, and heating? Not hard   Housing Stability   In the last 12 months, was there a time when you were not able to pay the mortgage or rent on time? N   In the past 12 months, how many times have you moved where you were living? 0   At any time in the past 12 months, were you homeless or living in a shelter (including now)? N   Transportation Needs   In the past 12 months, has lack of transportation kept you from medical appointments or from getting medications? no   In the past 12 months, has lack of transportation kept you from meetings, work, or from getting things needed for daily living? No   Stroke Family Assessment   Stroke Family Assessment Needed No   Intensity of Service   Intensity of Service 0-30 min     Patient is a readmission-discharged on Monday 11/25-was under observation and then upgraded to inpatient status.  PCP is Antonio Arambula and demographics are correct.      IMM letter given and explained to patient. Consent/signature obtained and copy given to patient.  Patient also requesting POA paperwork- explained and given to patient to take home.     DC PLAN:  Home

## 2024-11-27 NOTE — CARE PLAN
Problem: Safety - Adult  Goal: Free from fall injury  Outcome: Progressing     Problem: Chronic Conditions and Co-morbidities  Goal: Patient's chronic conditions and co-morbidity symptoms are monitored and maintained or improved  Outcome: Progressing   The patient's goals for the shift include      The clinical goals for the shift include Wean oxygen and maintain SpO2 > 90%    Over the shift, the patient did not make progress toward the following goals. Unable to maintain SpO2 > 90% on room air. 2L needed. IV ATB tolerated. Wheezes noted throughout with a harsh cough.

## 2024-11-27 NOTE — CONSULTS
Inpatient consult to Infectious Diseases  Consult performed by: Jori Sanchez MD  Consult ordered by: Kaylen Hernández PA-C            Primary MD: SEDA Crane-CNP    Reason For Consult  RSV    History Of Present Illness  Chantelle Rao is a 69 y.o. female presenting with shortness of breath.  She has a background history of COPD.  Onset was a couple of days ago, gradual, constant, with interval worsening care  She had associated occasional fever, and productive cough.  She tested positive for coronavirus.  She had a CT of the chest/personally reviewed, remarkable for bilateral infiltrates.  She is on vancomycin and Zosyn     Past Medical History  She has a past medical history of Acute frontal sinusitis, unspecified (10/30/2014), Anemia, COPD (chronic obstructive pulmonary disease) (Multi), Disease of thyroid gland, GERD (gastroesophageal reflux disease), Other chronic diseases of tonsils and adenoids (06/03/2021), and RLS (restless legs syndrome).    Surgical History  She has a past surgical history that includes Hysterectomy (05/09/2014); Lumbar laminectomy (05/09/2014); US guided fine percutaneous aspiration (10/15/2020); and CT guided percutaneous biopsy lung (07/08/2020).     Social History     Occupational History    Not on file   Tobacco Use    Smoking status: Every Day     Current packs/day: 0.25     Average packs/day: 0.3 packs/day for 1.9 years (0.5 ttl pk-yrs)     Types: Cigarettes     Start date: 2023    Smokeless tobacco: Never    Tobacco comments:     5 cigarettes day, Hx of smoking before for a few months.   Vaping Use    Vaping status: Never Used   Substance and Sexual Activity    Alcohol use: Not Currently    Drug use: Never    Sexual activity: Not Currently     Travel History   Travel since 10/27/24    No documented travel since 10/27/24           Family History  Family History   Problem Relation Name Age of Onset    Breast cancer Mother      Breast cancer Sister      Cancer Other  Family History     Breast cancer Mother's Sister       Allergies  Patient has no known allergies.     Immunization History   Administered Date(s) Administered    Influenza, seasonal, injectable 09/26/2014     Medications  Home medications:  Medications Prior to Admission   Medication Sig Dispense Refill Last Dose/Taking    albuterol (Ventolin HFA) 90 mcg/actuation inhaler Inhale 2 puffs every 4 hours if needed for wheezing or shortness of breath. 8 g 5     ALPRAZolam (Xanax) 0.5 mg tablet Take 1 tablet (0.5 mg) by mouth 3 times a day as needed for anxiety. 90 tablet 0     azithromycin (Zithromax) 500 mg tablet Take 1 tablet (500 mg) by mouth once every 24 hours for 1 day. 1 tablet 0     cefuroxime (Ceftin) 500 mg tablet Take 1 tablet (500 mg) by mouth 2 times a day for 7 days. 14 tablet 0     levothyroxine (Synthroid, Levoxyl) 25 mcg tablet Take 1 tablet (25 mcg) by mouth once daily in the morning. Take before meals. 90 tablet 2     ondansetron (Zofran) 4 mg tablet Take 1 tablet (4 mg) by mouth every 8 hours if needed for nausea or vomiting.       ondansetron ODT (Zofran-ODT) 4 mg disintegrating tablet Take 1 tablet (4 mg) by mouth every 8 hours if needed for nausea or vomiting. 90 tablet 0     predniSONE (Deltasone) 10 mg tablet Take 3 tablets (30 mg) by mouth once daily for 3 days, THEN 2 tablets (20 mg) once daily for 3 days, THEN 1 tablet (10 mg) once daily for 3 days. 18 tablet 0     rOPINIRole (Requip) 4 mg tablet Take 1 tablet (4 mg) by mouth 2 times a day. 180 tablet 0     torsemide (Demadex) 20 mg tablet Take 2 tablets (40 mg) by mouth 2 times a day.       zolpidem (Ambien) 10 mg tablet Take 1 tablet (10 mg) by mouth as needed at bedtime for sleep. 30 tablet 0      Current medications:  Scheduled medications  benzonatate, 100 mg, oral, TID  enoxaparin, 40 mg, subcutaneous, q24h  ipratropium-albuteroL, 3 mL, nebulization, TID  levothyroxine, 25 mcg, oral, Daily before breakfast  methylPREDNISolone sodium  "succinate (PF), 40 mg, intravenous, q8h  pantoprazole, 40 mg, oral, Daily before breakfast   Or  pantoprazole, 40 mg, intravenous, Daily before breakfast  piperacillin-tazobactam, 3.375 g, intravenous, q6h  polyethylene glycol, 17 g, oral, Daily  rOPINIRole, 4 mg, oral, BID  torsemide, 40 mg, oral, BID  vancomycin, 1,750 mg, intravenous, q24h      Continuous medications     PRN medications  PRN medications: acetaminophen **OR** [DISCONTINUED] acetaminophen **OR** [DISCONTINUED] acetaminophen, acetaminophen **OR** [DISCONTINUED] acetaminophen **OR** [DISCONTINUED] acetaminophen, albuterol, ALPRAZolam, benzocaine-menthol, dextromethorphan-guaifenesin, guaiFENesin, ketorolac, melatonin, ondansetron ODT **OR** ondansetron, oxygen, vancomycin, zolpidem    Review of Systems   Constitutional:  Positive for fatigue. Negative for chills.   Respiratory:  Positive for cough and shortness of breath.    Gastrointestinal:  Negative for diarrhea, nausea and vomiting.   All other systems reviewed and are negative.       Objective  Range of Vitals (last 24 hours)  Heart Rate:  []   Temp:  [36.6 °C (97.8 °F)-36.7 °C (98.1 °F)]   Resp:  [18-25]   BP: ()/(51-84)   Height:  [162.6 cm (5' 4\")]   Weight:  [72.6 kg (160 lb)-74.7 kg (164 lb 10.9 oz)]   SpO2:  [91 %-96 %]   Daily Weight  11/26/24 : 74.7 kg (164 lb 10.9 oz)    Body mass index is 28.27 kg/m².     Physical Exam  Constitutional:       Appearance: Normal appearance.   HENT:      Head: Normocephalic and atraumatic.      Nose: Nose normal.   Eyes:      General: No scleral icterus.     Extraocular Movements: Extraocular movements intact.      Conjunctiva/sclera: Conjunctivae normal.   Cardiovascular:      Rate and Rhythm: Normal rate and regular rhythm.      Heart sounds: Normal heart sounds.   Pulmonary:      Breath sounds: Wheezing present.   Abdominal:      General: Bowel sounds are normal.      Palpations: Abdomen is soft.      Tenderness: There is no abdominal " tenderness.   Musculoskeletal:      Cervical back: Normal range of motion and neck supple.      Right lower leg: No edema.      Left lower leg: No edema.   Skin:     General: Skin is warm and dry.   Neurological:      Mental Status: She is alert and oriented to person, place, and time.   Psychiatric:         Behavior: Behavior normal.          Relevant Results  Outside Hospital Results    Labs  Results from last 72 hours   Lab Units 11/27/24  0635 11/26/24  1511 11/25/24  0550   WBC AUTO x10*3/uL 9.7 11.8* 19.4*   HEMOGLOBIN g/dL 12.7 13.5 12.9   HEMATOCRIT % 39.2 40.7 40.1   PLATELETS AUTO x10*3/uL 286 299 281   NEUTROS PCT AUTO %  --  65.0  --    LYMPHS PCT AUTO %  --  21.2  --    MONOS PCT AUTO %  --  9.6  --    EOS PCT AUTO %  --  1.0  --      Results from last 72 hours   Lab Units 11/27/24  0635 11/26/24  1511 11/25/24  0550   SODIUM mmol/L 140 139 140   POTASSIUM mmol/L 3.9 3.5 3.8   CHLORIDE mmol/L 100 105 110*   CO2 mmol/L 28 24 19*   BUN mg/dL 17 18 16   CREATININE mg/dL 0.88 0.89 0.72   GLUCOSE mg/dL 133* 83 129*   CALCIUM mg/dL 9.1 9.0 9.6   ANION GAP mmol/L 16 14 15   EGFR mL/min/1.73m*2 71 70 >90     Results from last 72 hours   Lab Units 11/26/24  1511   ALK PHOS U/L 99   BILIRUBIN TOTAL mg/dL 0.3   PROTEIN TOTAL g/dL 7.3   ALT U/L 28   AST U/L 22   ALBUMIN g/dL 3.9     Estimated Creatinine Clearance: 59.7 mL/min (by C-G formula based on SCr of 0.88 mg/dL).  C-Reactive Protein   Date Value Ref Range Status   10/08/2024 0.93 <1.00 mg/dL Final     Sedimentation Rate   Date Value Ref Range Status   10/08/2024 45 (H) 0 - 30 mm/h Final     HIV 1/2 Antigen/Antibody Screen with Reflex to Confirmation   Date Value Ref Range Status   10/08/2024 Nonreactive Nonreactive Final     Hepatitis C AB   Date Value Ref Range Status   10/08/2024 Nonreactive Nonreactive Final     Comment:     Results from patients taking biotin supplements or receiving high-dose biotin therapy should be interpreted with caution due to  possible interference with this test. Providers may contact their local laboratory for further information.     Microbiology  Susceptibility data from last 90 days.  Collected Specimen Info Organism   11/24/24 Fluid from SPUTUM Normal throat laura       Imaging  ECG 12 lead    Result Date: 11/27/2024  Normal sinus rhythm Normal ECG When compared with ECG of 23-NOV-2024 19:29, Nonspecific T wave abnormality no longer evident in Anterolateral leads See ED provider note for full interpretation and clinical correlation Confirmed by Awilda Morales ([id number]) on 11/27/2024 4:25:34 PM    CT angio chest for pulmonary embolism    Result Date: 11/26/2024  Interpreted By:  Sunni Newsome, STUDY: CT ANGIO CHEST FOR PULMONARY EMBOLISM;  11/26/2024 3:56 pm   INDICATION: Signs/Symptoms:r/o PE.     COMPARISON: 11/23/2024   ACCESSION NUMBER(S): [id number]   ORDERING CLINICIAN: LIBRADO GROVER   TECHNIQUE: CT of the chest was performed. Sagittal and coronal reconstructions were generated. 75 ML Omnipaque 350 intravenous contrast given for the examination.  Multiplanar reconstructions of the pulmonary vessels were created on an independent workstation and provided for review.   FINDINGS:     CHEST WALL AND LOWER NECK: No significant axillary lymphadenopathy.   MEDIASTINUM AND CHRISS:  Gaseous distention of the proximal to mid esophagus and mild esophageal wall thickening similar to the prior exam. 1.1 cm AP window node image 114/324. 1.1 cm right hilar node image 140/324. Partially calcified 1.2 cm subcarinal node. These are similar to the previous exam.   HEART AND VESSELS:  No gross central PE however limited assessment for segmental and subsegmental/peripheral PE due to suboptimal timing of IV contrast bolus. The heart is normal in size. No significant pericardial effusion. Atherosclerotic calcifications including the coronary arteries. 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   LUNGS, PLEURA,  LARGE AIRWAYS:  Bilateral nodular/micronodular infiltrates slightly increased since the previous exam. Dominant irregular 2 cm opacity in the left lung base image 230/324 is similar to the prior study. No significant pleural effusion. The central airways are patent.   UPPER ABDOMEN:  No focal lesion in the included upper abdominal viscera.   BONES:  Degenerative changes of the visualized spine.       No gross central PE however limited assessment for peripheral PE due to technical factors.   Slight worsening of bilateral nodular/micronodular infiltrates since 3 days prior. Unchanged dominant irregular masslike opacity in the left lung base, reportedly hypermetabolic on prior PET scan and concerning for malignancy, and mild lymphadenopathy. Further evaluation/continued follow-up recommended.   MACRO: None.   Signed by: Sunni Newsome 11/26/2024 4:50 PM Dictation workstation:   [id number]    ECG 12 lead    Result Date: 11/26/2024  Sinus tachycardia Nonspecific ST and T wave abnormality Abnormal ECG When compared with ECG of 23-NOV-2024 16:33, (unconfirmed) No significant change was found See ED provider note for full interpretation and clinical correlation Confirmed by Lashawn Lancaster ([id number]) on 11/26/2024 4:33:51 PM    ECG 12 lead    Result Date: 11/26/2024  Sinus tachycardia Possible Left atrial enlargement Rightward axis Possible Inferior infarct (cited on or before 19-APR-2023) Abnormal ECG When compared with ECG of 19-APR-2023 11:03, Nonspecific T wave abnormality now evident in Anterolateral leads See ED provider note for full interpretation and clinical correlation Confirmed by Lashawn Lancaster ([id number]) on 11/26/2024 4:29:20 PM    Complete Pulmonary Function Test Pre/Post Bronchodialator (Spirometry Pre/Post/DLCO/Lung Volumes)    Result Date: 11/25/2024  Spirometry shows severe obstruction with small airways bronchodilator response. Lung volumes show air trapping. Diffusing capacity is normal.    CT head wo IV  contrast    Result Date: 11/24/2024  Interpreted By:  Brenden Marquis, STUDY: CT HEAD WO IV CONTRAST;  11/24/2024 9:50 am   INDICATION: Signs/Symptoms:headaches.     COMPARISON: None.   ACCESSION NUMBER(S): [id number]   ORDERING CLINICIAN: JIMMY STEARNS   TECHNIQUE: Noncontrast axial CT scan of head was performed. Angled reformats in brain and bone windows were generated. The images were reviewed in bone, brain, blood and soft tissue windows.   FINDINGS: CSF Spaces: The ventricles, sulci and basal cisterns are within normal limits. There is no abnormal extraaxial fluid collection.   Parenchyma:  The grey-white differentiation is intact. There is no mass effect or midline shift.  There is no acute intracranial hemorrhage.   Calvarium: The calvarium is unremarkable.   Paranasal sinuses and mastoids: There is mild mucosal thickening located within the visualized right sphenoid sinus, maxillary sinuses, and ethmoid air cells. Mastoid air cells are clear.       No acute intracranial abnormality or mass effect.   This study was interpreted at [facility].   MACRO: None   Signed by: Brenden Marquis 11/24/2024 3:07 PM Dictation workstation:   [id number]    CT angio chest for pulmonary embolism    Result Date: 11/23/2024  Interpreted By:  Gabo Bob, STUDY: CT ANGIO CHEST FOR PULMONARY EMBOLISM;  11/23/2024 7:14 pm   INDICATION: Signs/Symptoms:chest pain with elevated d dimer.   COMPARISON: 03/26/2024, PET imaging 10/30/2024   ACCESSION NUMBER(S): [id number]   ORDERING CLINICIAN: COMFORT JAVIER   TECHNIQUE: Contiguous axial images of the chest were obtained after the intravenous administration of iodinated contrast using angiographic PE protocol. Coronal and sagittal reformatted images were reconstructed from the axial data. MIP images were created on an independent workstation and reviewed.   FINDINGS: Suboptimal study using low-dose setting.   No significant pulmonary embolism given  limitations.   Heart size within normal limits.   No significant adenopathy suspected. Evaluation of the lungs limited due to respiratory motion. Redemonstrated is a elongated nodular density in the superior segment of the right lower lobe which again measures up to approximately 16 mm in diameter not significantly changed. Calcified granuloma redemonstrated right upper lobe.   There is a 2 cm area of nodular consolidation in the left lung base which has not significantly changed. This was hypermetabolic on prior PET imaging 10/30/2024 and is suspicious for malignancy.   No new consolidation or other significant new pulmonary mass.   Spondylotic degeneration is redemonstrated.       Suboptimal low-dose imaging technique limits evaluation. No significant central pulmonary embolism.   Left lung base mass again noted consistent with known/suspected hypermetabolic malignancy. Correlate with patient history.   MACRO: None.   Signed by: Gabo Bob 11/23/2024 7:55 PM Dictation workstation:   [id number]    XR chest 1 view    Result Date: 11/23/2024  Interpreted By:  Schoenberger, Joseph, STUDY: XR CHEST 1 VIEW;  11/23/2024 4:53 pm   INDICATION: Signs/Symptoms:Chest Pain.     COMPARISON: 01/15/2024   ACCESSION NUMBER(S): [id number]   ORDERING CLINICIAN: COMFORT JAVIER   FINDINGS:         CARDIOMEDIASTINAL SILHOUETTE: Cardiomediastinal silhouette is normal in size and configuration.   LUNGS: No definite new focal lung opacity. Coarsened lung markings unchanged. Tiny nodule in the periphery of the right mid lung is unchanged.   ABDOMEN: No remarkable upper abdominal findings.   BONES: No acute osseous changes.       1.  No evidence of acute cardiopulmonary process. Stable chest       MACRO: None   Signed by: Joseph Schoenberger 11/23/2024 5:05 PM Dictation workstation:   [id number]    Pulmonary Stress Test (6 Min. Walk)    Result Date: 11/7/2024  The patient had no significant desaturation walking in room air.  Sukh  saturation 93%.    NM PET CT FDG wholebody    Result Date: 10/30/2024  Interpreted By:  Froylan Oliver and Hanreck James STUDY: NM PET CT FDG WHOLEBODY;  10/30/2024 9:30 am   INDICATION: Signs/Symptoms:previous PET CT 2020 shows lung malignancy. Pt had no follow up. liver and lung density. Symptomatic.. ,D72.829 Elevated white blood cell count, unspecified,R79.89 Other specified abnormal findings of blood chemistry,R91.8 Other nonspecific abnormal finding of lung field,K76.9 Liver disease, unspecified,C34.90 Malignant neoplasm of unspecified part of unspecified bronchus or lung (Multi)   COMPARISON: 03/26/2024 CT chest and 03/08/2024 CT abdomen pelvis   ACCESSION NUMBER(S): [id number]   ORDERING CLINICIAN: RAINA LUNDY   TECHNIQUE: DIVISION OF NUCLEAR MEDICINE POSITRON EMISSION TOMOGRAPHY (PET-CT)   The patient received an intravenous dose of 12.8 mCi of Fluorine-18 fluorodeoxyglucose (FDG).  Positron emission tomographic (PET) images from mid thigh to skull base were then acquired after a one hour delay. Also acquired was a contemporaneous low dose non-contrast CT scan performed for attenuation correction of PET images and anatomic localization.  The PET and CT images were digitally fused for display.  All images were acquired on a combined PET-CT scanner unit. Some areas of FDG accumulation may be described in standardized uptake value (SUV) units.   CODING: Initial Treatment Strategy (PI)   CALIBRATION: Dose Injection-to-Scan Interval (mins): 59 min Mediastinal bloodpool SUV (normal 1.5-2.5): 2.1 Blood glucose: 115 mg/dL   FINDINGS: HEAD AND NECK: No evidence of focal FDG avid lesion in the partially visualized brain parenchyma, noting that evaluation is limited because of the expected physiologic diffuse FDG uptake in the brain. No focal FDG avid  soft tissue lesion is seen in the neck. No FDG avid  cervical lymphadenopathy is present.     CHEST: Hypermetabolic left lung base nodule demonstrates max SUV of  6.1. A more superior mildly FDG avid left lower lobe calcified granuloma demonstrates max SUV 4.1.   Few right-sided nodular opacities are only minimally FDG avid. For example: Posterolateral right lower lobe nodular opacity with max SUV 1.2. Right upper lobe calcified nodule with max SUV 1.0.     A few mildly FDG avid mediastinal and bilateral hilar lymph nodes are seen. For example: Right hilar node with max SUV of 3.5. Left hilar node with max SUV of 3.4. Subcarinal node with max SUV 2.8.   ABDOMEN AND PELVIS: No FDG avid  soft tissue lesion is present in the abdomen and pelvis. No evidence of FDG avid  lymphadenopathy. Physiologic radiotracer uptake is present in the liver and spleen with excretion into the bowel loops and the genitourinary tract.   MUSCULOSKELETAL: No focal FDG avid  lesion is seen in the axial or appendicular to suggest osseous metastasis. Hypermetabolic activity at the right aspect of the T8 vertebral body is likely degenerative in etiology given prominent osteophyte. A right hip arthroplasties present.       1. Hypermetabolic left lung base nodule suspicious for malignancy. Tissue sampling is recommended. 2. Additional bilateral pulmonary nodules, many of which demonstrate calcification and exhibit minimal to mild FDG avidity are likely benign. Continued attention on follow-up chest CT is recommended. 3. Nonspecific mildly hypermetabolic mediastinal and bilateral hilar lymph nodes are favored to be reactive.     I personally reviewed the images/study and I agree with the resident Jermaine Muller's findings as stated. This study was interpreted at [place], [place].   Signed by: Froylan Oliver 10/30/2024 10:53 AM Dictation workstation:   [id number]     Assessment/Plan   Acute hypoxic respiratory failure  RSV  Abnormal CT-RSV pneumonitis, versus evolving pneumonia-negative MRSA PCR  Pulmonary nodules    Continue Zosyn  Discontinue  vancomycin  Oxygen as needed  Contact precautions  Supportive care  Monitor temperature and WBC      Jori Sanchez MD

## 2024-11-27 NOTE — DISCHARGE INSTR - OTHER ORDERS
Thank you for choosing [facility] for your Health Care needs.  Also, thank you for allowing us to take you and your families preferences into account when determining your discharge plan.  Stay well!     You may receive a survey in the mail within the next couple weeks. Please take the time to complete it and return it. Your input is ALWAYS important to us. Thank you!  Your Care Transition Team - Cande Braun & Angie - [phone]      For questions about your medications listed on your discharge instructions, please call the Nurses Station at [phone].

## 2024-11-27 NOTE — SIGNIFICANT EVENT
"This RT at bedside for patient desaturating to 86% while eating. Patient has been noted to be dipping to 89% on RA. Patient noted that she noticed she was a \"little short of breath.\" This RT placed pateint back on 2L NC.   "

## 2024-11-27 NOTE — PROGRESS NOTES
Chantelle Rao is a 69 y.o. female on day 0 of admission presenting with RSV infection.      Subjective   Pt assessed at bedside, sitting up eating breakfast. Currently on 3L acutely. Complains of a nagging cough.        Objective     Last Recorded Vitals  /60 (BP Location: Left arm, Patient Position: Lying)   Pulse 91   Temp 36.6 °C (97.9 °F) (Temporal)   Resp 18   Wt 74.7 kg (164 lb 10.9 oz)   SpO2 92%   Intake/Output last 3 Shifts:    Intake/Output Summary (Last 24 hours) at 11/27/2024 1057  Last data filed at 11/27/2024 0800  Gross per 24 hour   Intake 240 ml   Output 1200 ml   Net -960 ml       Admission Weight  Weight: 72.6 kg (160 lb) (11/26/24 1502)    Daily Weight  11/26/24 : 74.7 kg (164 lb 10.9 oz)    Image Results  CT angio chest for pulmonary embolism  Narrative: Interpreted By:  Sunni Newsome,   STUDY:  CT ANGIO CHEST FOR PULMONARY EMBOLISM;  11/26/2024 3:56 pm      INDICATION:  Signs/Symptoms:r/o PE.          COMPARISON:  11/23/2024      ACCESSION NUMBER(S):  [id number]      ORDERING CLINICIAN:  LIBRADO GROVER      TECHNIQUE:  CT of the chest was performed. Sagittal and coronal reconstructions  were generated. 75 ML Omnipaque 350 intravenous contrast given for  the examination.  Multiplanar reconstructions of the pulmonary  vessels were created on an independent workstation and provided for  review.      FINDINGS:          CHEST WALL AND LOWER NECK: No significant axillary lymphadenopathy.      MEDIASTINUM AND CHRISS:  Gaseous distention of the proximal to mid  esophagus and mild esophageal wall thickening similar to the prior  exam. 1.1 cm AP window node image 114/324. 1.1 cm right hilar node  image 140/324. Partially calcified 1.2 cm subcarinal node. These are  similar to the previous exam.      HEART AND VESSELS:  No gross central PE however limited assessment  for segmental and subsegmental/peripheral PE due to suboptimal timing  of IV contrast bolus. The heart is normal in size. No  significant  pericardial effusion. Atherosclerotic calcifications including the  coronary arteries. Aberrant right subclavian artery coursing  posterior to the esophagus.      LUNGS, PLEURA, LARGE AIRWAYS:  Bilateral nodular/micronodular  infiltrates slightly increased since the previous exam. Dominant  irregular 2 cm opacity in the left lung base image 230/324 is similar  to the prior study. No significant pleural effusion. The central  airways are patent.      UPPER ABDOMEN:  No focal lesion in the included upper abdominal  viscera.      BONES:  Degenerative changes of the visualized spine.      Impression: No gross central PE however limited assessment for peripheral PE due  to technical factors.      Slight worsening of bilateral nodular/micronodular infiltrates since  3 days prior. Unchanged dominant irregular masslike opacity in the  left lung base, reportedly hypermetabolic on prior PET scan and  concerning for malignancy, and mild lymphadenopathy. Further  evaluation/continued follow-up recommended.      MACRO:  None.      Signed by: Sunni Newsome 11/26/2024 4:50 PM  Dictation workstation:   [id number]  ECG 12 lead  Sinus tachycardia  Nonspecific ST and T wave abnormality  Abnormal ECG  When compared with ECG of 23-NOV-2024 16:33, (unconfirmed)  No significant change was found  See ED provider note for full interpretation and clinical correlation  Confirmed by Lashawn Lancaster ([id number]) on 11/26/2024 4:33:51 PM  ECG 12 lead  Sinus tachycardia  Possible Left atrial enlargement  Rightward axis  Possible Inferior infarct (cited on or before 19-APR-2023)  Abnormal ECG  When compared with ECG of 19-APR-2023 11:03,  Nonspecific T wave abnormality now evident in Anterolateral leads  See ED provider note for full interpretation and clinical correlation  Confirmed by Lashawn Lancaster ([id number]) on 11/26/2024 4:29:20 PM  Complete Pulmonary Function Test Pre/Post Bronchodialator (Spirometry Pre/Post/DLCO/Lung Volumes)  Spirometry  shows severe obstruction with small airways bronchodilator response. Lung volumes show air trapping. Diffusing capacity is normal.      Physical Exam  Vitals reviewed.   HENT:      Head: Normocephalic and atraumatic.      Right Ear: External ear normal.      Left Ear: External ear normal.      Nose: Nose normal.      Mouth/Throat:      Pharynx: Oropharynx is clear.   Eyes:      Conjunctiva/sclera: Conjunctivae normal.   Cardiovascular:      Rate and Rhythm: Normal rate and regular rhythm.      Pulses: Normal pulses.      Heart sounds: Normal heart sounds.   Pulmonary:      Breath sounds: Wheezing present.   Abdominal:      General: Bowel sounds are normal.      Palpations: Abdomen is soft.   Musculoskeletal:         General: Normal range of motion.      Cervical back: Normal range of motion and neck supple.   Skin:     General: Skin is dry.   Neurological:      General: No focal deficit present.      Mental Status: She is alert and oriented to person, place, and time.   Psychiatric:         Mood and Affect: Mood normal.         Behavior: Behavior normal.         Relevant Results  Scheduled medications  benzonatate, 100 mg, oral, TID  enoxaparin, 40 mg, subcutaneous, q24h  ipratropium-albuteroL, 3 mL, nebulization, TID  levothyroxine, 25 mcg, oral, Daily before breakfast  methylPREDNISolone sodium succinate (PF), 40 mg, intravenous, q8h  pantoprazole, 40 mg, oral, Daily before breakfast   Or  pantoprazole, 40 mg, intravenous, Daily before breakfast  piperacillin-tazobactam, 3.375 g, intravenous, q6h  polyethylene glycol, 17 g, oral, Daily  rOPINIRole, 4 mg, oral, BID  torsemide, 40 mg, oral, BID  vancomycin, 1,750 mg, intravenous, q24h      Continuous medications     PRN medications  PRN medications: acetaminophen **OR** [DISCONTINUED] acetaminophen **OR** [DISCONTINUED] acetaminophen, acetaminophen **OR** [DISCONTINUED] acetaminophen **OR** [DISCONTINUED] acetaminophen, albuterol, ALPRAZolam, benzocaine-menthol,  dextromethorphan-guaifenesin, guaiFENesin, ketorolac, melatonin, ondansetron ODT **OR** ondansetron, oxygen, vancomycin, zolpidem    Results for orders placed or performed during the hospital encounter of 11/26/24 (from the past 24 hours)   CBC and Auto Differential   Result Value Ref Range    WBC 11.8 (H) 4.4 - 11.3 x10*3/uL    nRBC 0.0 0.0 - 0.0 /100 WBCs    RBC 4.65 4.00 - 5.20 x10*6/uL    Hemoglobin 13.5 12.0 - 16.0 g/dL    Hematocrit 40.7 36.0 - 46.0 %    MCV 88 80 - 100 fL    MCH 29.0 26.0 - 34.0 pg    MCHC 33.2 32.0 - 36.0 g/dL    RDW 13.8 11.5 - 14.5 %    Platelets 299 150 - 450 x10*3/uL    Neutrophils % 65.0 40.0 - 80.0 %    Immature Granulocytes %, Automated 2.7 (H) 0.0 - 0.9 %    Lymphocytes % 21.2 13.0 - 44.0 %    Monocytes % 9.6 2.0 - 10.0 %    Eosinophils % 1.0 0.0 - 6.0 %    Basophils % 0.5 0.0 - 2.0 %    Neutrophils Absolute 7.68 1.20 - 7.70 x10*3/uL    Immature Granulocytes Absolute, Automated 0.32 0.00 - 0.70 x10*3/uL    Lymphocytes Absolute 2.50 1.20 - 4.80 x10*3/uL    Monocytes Absolute 1.14 (H) 0.10 - 1.00 x10*3/uL    Eosinophils Absolute 0.12 0.00 - 0.70 x10*3/uL    Basophils Absolute 0.06 0.00 - 0.10 x10*3/uL   Comprehensive metabolic panel   Result Value Ref Range    Glucose 83 74 - 99 mg/dL    Sodium 139 136 - 145 mmol/L    Potassium 3.5 3.5 - 5.3 mmol/L    Chloride 105 98 - 107 mmol/L    Bicarbonate 24 21 - 32 mmol/L    Anion Gap 14 10 - 20 mmol/L    Urea Nitrogen 18 6 - 23 mg/dL    Creatinine 0.89 0.50 - 1.05 mg/dL    eGFR 70 >60 mL/min/1.73m*2    Calcium 9.0 8.6 - 10.3 mg/dL    Albumin 3.9 3.4 - 5.0 g/dL    Alkaline Phosphatase 99 33 - 136 U/L    Total Protein 7.3 6.4 - 8.2 g/dL    AST 22 9 - 39 U/L    Bilirubin, Total 0.3 0.0 - 1.2 mg/dL    ALT 28 7 - 45 U/L   Magnesium   Result Value Ref Range    Magnesium 1.89 1.60 - 2.40 mg/dL   B-Type Natriuretic Peptide   Result Value Ref Range     (H) 0 - 99 pg/mL   Lactate   Result Value Ref Range    Lactate 1.5 0.4 - 2.0 mmol/L   Blood Gas  Venous Full Panel   Result Value Ref Range    POCT pH, Venous 7.42 7.33 - 7.43 pH    POCT pCO2, Venous 38 (L) 41 - 51 mm Hg    POCT pO2, Venous 42 35 - 45 mm Hg    POCT SO2, Venous 68 45 - 75 %    POCT Oxy Hemoglobin, Venous 67.1 45.0 - 75.0 %    POCT Hematocrit Calculated, Venous 36.0 36.0 - 46.0 %    POCT Sodium, Venous 135 (L) 136 - 145 mmol/L    POCT Potassium, Venous 3.5 3.5 - 5.3 mmol/L    POCT Chloride, Venous 104 98 - 107 mmol/L    POCT Ionized Calicum, Venous 1.22 1.10 - 1.33 mmol/L    POCT Glucose, Venous 96 74 - 99 mg/dL    POCT Lactate, Venous 1.7 0.4 - 2.0 mmol/L    POCT Base Excess, Venous 0.2 -2.0 - 3.0 mmol/L    POCT HCO3 Calculated, Venous 24.6 22.0 - 26.0 mmol/L    POCT Hemoglobin, Venous 12.0 12.0 - 16.0 g/dL    POCT Anion Gap, Venous 10.0 10.0 - 25.0 mmol/L    Patient Temperature      FiO2 28 %   Troponin I, High Sensitivity, Initial   Result Value Ref Range    Troponin I, High Sensitivity 19 (H) 0 - 13 ng/L   Sars-CoV-2 PCR   Result Value Ref Range    Coronavirus 2019, PCR Not Detected Not Detected   RSV PCR   Result Value Ref Range    RSV PCR Detected (A) Not Detected   Influenza A, and B PCR   Result Value Ref Range    Flu A Result Not Detected Not Detected    Flu B Result Not Detected Not Detected   Troponin, High Sensitivity, 1 Hour   Result Value Ref Range    Troponin I, High Sensitivity 5 0 - 13 ng/L   CBC   Result Value Ref Range    WBC 9.7 4.4 - 11.3 x10*3/uL    nRBC 0.0 0.0 - 0.0 /100 WBCs    RBC 4.46 4.00 - 5.20 x10*6/uL    Hemoglobin 12.7 12.0 - 16.0 g/dL    Hematocrit 39.2 36.0 - 46.0 %    MCV 88 80 - 100 fL    MCH 28.5 26.0 - 34.0 pg    MCHC 32.4 32.0 - 36.0 g/dL    RDW 13.6 11.5 - 14.5 %    Platelets 286 150 - 450 x10*3/uL   Basic metabolic panel   Result Value Ref Range    Glucose 133 (H) 74 - 99 mg/dL    Sodium 140 136 - 145 mmol/L    Potassium 3.9 3.5 - 5.3 mmol/L    Chloride 100 98 - 107 mmol/L    Bicarbonate 28 21 - 32 mmol/L    Anion Gap 16 10 - 20 mmol/L    Urea Nitrogen 17  6 - 23 mg/dL    Creatinine 0.88 0.50 - 1.05 mg/dL    eGFR 71 >60 mL/min/1.73m*2    Calcium 9.1 8.6 - 10.3 mg/dL          Assessment/Plan      Assessment & Plan  Anxiety    RSV (acute bronchiolitis due to respiratory syncytial virus)    Hypothyroidism    RSV infection    RLS (restless legs syndrome)    Tobacco use    Wheezing    #RSV (acute bronchiolitis due to respiratory syncytial virus)  #Acute hypoxic respiratory failure   #Tobacco use disorder  #COPD exacerbation  -discharged home, worsened the next day, severe wheezing, sore throat, barking cough  -PFT: Spirometry shows severe obstruction with small airways bronchodilator response. Lung volumes show air trapping. Diffusing capacity is normal.  -CT PE: No gross central PE however limited assessment for peripheral PE due to technical factors.   Slight worsening of bilateral nodular/micronodular infiltrates since 3 days prior. Unchanged dominant irregular masslike opacity in the left lung base, reportedly hypermetabolic on prior PET scan and concerning for malignancy, and mild lymphadenopathy. Further evaluation/continued follow-up recommended.   -Planned to have biopsy with Dr. Larkin on  for lung nodule, cancelled due to illness   -WBC 11.8 > 9.7  -VB.42/38/42/24.6  -continue duonebs  -continue IV solumedrol q8  -discontinue IV levaquin, day 1 > transitioned to IV vanc, zosyn per Dat (Day 1)  -RT consult, bronchial hygiene  -ID consult, recs appreciated  -supportive care     #HLD  #Angina  #Aortic Insufficiency, severe  -Echo 2023: 1. Left ventricular systolic function is normal with a 55-60% estimated ejection fraction.  2. Spectral Doppler shows an impaired relaxation pattern of left ventricular diastolic filling. 3. Moderate to severe aortic valve regurgitation. 4. There is mild mitral and tricuspid regurgitation.  5. The estimated pulmonary artery pressure is mildly elevated with the RVSP at 42.5 mmHg.  -  -troponin 19 > 5  -sees   Jose De Jesus  -continue torsemide     #Hypothyroidism  -continue levothyroxine     #RLS (restless legs syndrome)  -continue ropinirole     #Anxiety  -continue alprazolam as needed    DVT ppx  -lovenox     PUD ppx  -pantoprazole    F: PRN  E: Replete per protocol  N: Regular  A: PIV    Disposition: Pt requires more than 2 inpatient days at this time   Code Status: Full Code        NOVA Thomas    Attending Attestation:    Patient was seen and examined face to face, history and physical was taken personally at bedside the APRN-CNP, was present for the whole duration of the exam who participated in the documentation of this note. I performed the medical decision-making components (assessment and plan of care). I have reviewed the documentation and verified the findings in the note as written with additions or exceptions as stated in the body of this note.   Sitting at the edge of the bed, on oxygen, no distress.  There are crackles and rhonchi in both side of the lungs.  She has positive RSV, CT scan was reviewed and compared with the recent one there infiltrate more on the right side, we will treat patient for hospital-acquired pneumonia, recurrent COPD, follow-up with thoracic surgery, when recovered from current issues, for biopsy and removal of the left lung mass.    Dr. Kam Helm MD  Internal Medicine

## 2024-11-28 LAB
ANION GAP SERPL CALC-SCNC: 15 MMOL/L (ref 10–20)
BACTERIA BLD CULT: NORMAL
BACTERIA BLD CULT: NORMAL
BUN SERPL-MCNC: 25 MG/DL (ref 6–23)
CALCIUM SERPL-MCNC: 9.4 MG/DL (ref 8.6–10.3)
CHLORIDE SERPL-SCNC: 96 MMOL/L (ref 98–107)
CO2 SERPL-SCNC: 30 MMOL/L (ref 21–32)
CREAT SERPL-MCNC: 1.12 MG/DL (ref 0.5–1.05)
EGFRCR SERPLBLD CKD-EPI 2021: 53 ML/MIN/1.73M*2
ERYTHROCYTE [DISTWIDTH] IN BLOOD BY AUTOMATED COUNT: 13.7 % (ref 11.5–14.5)
GLUCOSE SERPL-MCNC: 125 MG/DL (ref 74–99)
HCT VFR BLD AUTO: 40.1 % (ref 36–46)
HGB BLD-MCNC: 13 G/DL (ref 12–16)
LEGIONELLA AG UR QL: NEGATIVE
MCH RBC QN AUTO: 28.4 PG (ref 26–34)
MCHC RBC AUTO-ENTMCNC: 32.4 G/DL (ref 32–36)
MCV RBC AUTO: 88 FL (ref 80–100)
NRBC BLD-RTO: 0 /100 WBCS (ref 0–0)
PLATELET # BLD AUTO: 319 X10*3/UL (ref 150–450)
POTASSIUM SERPL-SCNC: 3.3 MMOL/L (ref 3.5–5.3)
RBC # BLD AUTO: 4.58 X10*6/UL (ref 4–5.2)
S PNEUM AG UR QL: NEGATIVE
SODIUM SERPL-SCNC: 138 MMOL/L (ref 136–145)
WBC # BLD AUTO: 17 X10*3/UL (ref 4.4–11.3)

## 2024-11-28 PROCEDURE — 36415 COLL VENOUS BLD VENIPUNCTURE: CPT | Mod: IPSPLIT

## 2024-11-28 PROCEDURE — 94760 N-INVAS EAR/PLS OXIMETRY 1: CPT | Mod: IPSPLIT

## 2024-11-28 PROCEDURE — 2500000005 HC RX 250 GENERAL PHARMACY W/O HCPCS: Mod: IPSPLIT

## 2024-11-28 PROCEDURE — 2500000004 HC RX 250 GENERAL PHARMACY W/ HCPCS (ALT 636 FOR OP/ED): Mod: IPSPLIT

## 2024-11-28 PROCEDURE — 80048 BASIC METABOLIC PNL TOTAL CA: CPT | Mod: IPSPLIT

## 2024-11-28 PROCEDURE — 2500000002 HC RX 250 W HCPCS SELF ADMINISTERED DRUGS (ALT 637 FOR MEDICARE OP, ALT 636 FOR OP/ED): Mod: IPSPLIT | Performed by: NURSE PRACTITIONER

## 2024-11-28 PROCEDURE — 94640 AIRWAY INHALATION TREATMENT: CPT | Mod: IPSPLIT

## 2024-11-28 PROCEDURE — 99232 SBSQ HOSP IP/OBS MODERATE 35: CPT

## 2024-11-28 PROCEDURE — 1200000002 HC GENERAL ROOM WITH TELEMETRY DAILY: Mod: IPSPLIT

## 2024-11-28 PROCEDURE — 2500000001 HC RX 250 WO HCPCS SELF ADMINISTERED DRUGS (ALT 637 FOR MEDICARE OP): Mod: IPSPLIT | Performed by: STUDENT IN AN ORGANIZED HEALTH CARE EDUCATION/TRAINING PROGRAM

## 2024-11-28 PROCEDURE — 2500000002 HC RX 250 W HCPCS SELF ADMINISTERED DRUGS (ALT 637 FOR MEDICARE OP, ALT 636 FOR OP/ED): Mod: IPSPLIT

## 2024-11-28 PROCEDURE — 2500000001 HC RX 250 WO HCPCS SELF ADMINISTERED DRUGS (ALT 637 FOR MEDICARE OP): Mod: IPSPLIT

## 2024-11-28 PROCEDURE — 85027 COMPLETE CBC AUTOMATED: CPT | Mod: IPSPLIT

## 2024-11-28 RX ORDER — POTASSIUM CHLORIDE 20 MEQ/1
40 TABLET, EXTENDED RELEASE ORAL ONCE
Status: COMPLETED | OUTPATIENT
Start: 2024-11-28 | End: 2024-11-28

## 2024-11-28 ASSESSMENT — COGNITIVE AND FUNCTIONAL STATUS - GENERAL
DAILY ACTIVITIY SCORE: 24
DAILY ACTIVITIY SCORE: 24
MOBILITY SCORE: 24
MOBILITY SCORE: 24

## 2024-11-28 ASSESSMENT — PAIN SCALES - GENERAL
PAINLEVEL_OUTOF10: 0 - NO PAIN

## 2024-11-28 ASSESSMENT — PAIN - FUNCTIONAL ASSESSMENT
PAIN_FUNCTIONAL_ASSESSMENT: 0-10
PAIN_FUNCTIONAL_ASSESSMENT: 0-10

## 2024-11-28 NOTE — CARE PLAN
The patient's goals for the shift include      The clinical goals for the shift include Wean oxygen and maintain Spo2 > 90%    SpO2 >90% room air. Dyspnea with exertion. Lungs sounds and cough improved. Tolerating treatment plan. Plan for RT to assess overnight pulseox.

## 2024-11-28 NOTE — PROGRESS NOTES
Chantelle Rao is a 69 y.o. female on day 1 of admission presenting with RSV infection.      Subjective   Pt assessed at bedside, says she is feeling a little better today. Still requiring 2L acutely with Sp02 92%. Discussed discontinuing abx given neg procal and ID rec, weaning o2 as able, and continued steroids/bronchodilators. Pt in agreement with plan.        Objective     Last Recorded Vitals  /65 (BP Location: Right arm, Patient Position: Lying)   Pulse 89   Temp 36.4 °C (97.5 °F) (Temporal)   Resp 17   Wt 74.7 kg (164 lb 10.9 oz)   SpO2 93%   Intake/Output last 3 Shifts:    Intake/Output Summary (Last 24 hours) at 11/28/2024 1018  Last data filed at 11/27/2024 1729  Gross per 24 hour   Intake 1280 ml   Output --   Net 1280 ml       Admission Weight  Weight: 72.6 kg (160 lb) (11/26/24 1502)    Daily Weight  11/26/24 : 74.7 kg (164 lb 10.9 oz)    Image Results  ECG 12 lead  Normal sinus rhythm  Normal ECG  When compared with ECG of 23-NOV-2024 19:29,  Nonspecific T wave abnormality no longer evident in Anterolateral leads  See ED provider note for full interpretation and clinical correlation  Confirmed by Awilda Morales ([id number]) on 11/27/2024 4:25:34 PM      Physical Exam  Vitals reviewed.   HENT:      Head: Normocephalic and atraumatic.      Right Ear: External ear normal.      Left Ear: External ear normal.      Nose: Nose normal.      Mouth/Throat:      Pharynx: Oropharynx is clear.   Eyes:      Conjunctiva/sclera: Conjunctivae normal.   Cardiovascular:      Rate and Rhythm: Normal rate and regular rhythm.      Pulses: Normal pulses.      Heart sounds: Normal heart sounds.   Pulmonary:      Comments: Mild wheeze, improved from yesterday  Abdominal:      General: Bowel sounds are normal.      Palpations: Abdomen is soft.   Musculoskeletal:         General: Normal range of motion.      Cervical back: Normal range of motion and neck supple.   Skin:     General: Skin is dry.   Neurological:       General: No focal deficit present.      Mental Status: She is alert and oriented to person, place, and time.   Psychiatric:         Mood and Affect: Mood normal.         Behavior: Behavior normal.       Relevant Results  Scheduled medications  benzonatate, 100 mg, oral, TID  enoxaparin, 40 mg, subcutaneous, q24h  ipratropium-albuteroL, 3 mL, nebulization, TID  levothyroxine, 25 mcg, oral, Daily before breakfast  methylPREDNISolone sodium succinate (PF), 40 mg, intravenous, q8h  pantoprazole, 40 mg, oral, Daily before breakfast  polyethylene glycol, 17 g, oral, Daily  rOPINIRole, 4 mg, oral, BID  torsemide, 40 mg, oral, BID      Continuous medications     PRN medications  PRN medications: acetaminophen **OR** [DISCONTINUED] acetaminophen **OR** [DISCONTINUED] acetaminophen, acetaminophen **OR** [DISCONTINUED] acetaminophen **OR** [DISCONTINUED] acetaminophen, albuterol, ALPRAZolam, benzocaine-menthol, dextromethorphan-guaifenesin, guaiFENesin, ketorolac, melatonin, ondansetron ODT **OR** ondansetron, oxygen, zolpidem    Results for orders placed or performed during the hospital encounter of 11/26/24 (from the past 24 hours)   MRSA Surveillance for Vancomycin De-escalation, PCR    Specimen: Anterior Nares; Swab   Result Value Ref Range    MRSA PCR Not Detected Not Detected   Procalcitonin   Result Value Ref Range    Procalcitonin 0.03 <=0.07 ng/mL   Basic Metabolic Panel   Result Value Ref Range    Glucose 125 (H) 74 - 99 mg/dL    Sodium 138 136 - 145 mmol/L    Potassium 3.3 (L) 3.5 - 5.3 mmol/L    Chloride 96 (L) 98 - 107 mmol/L    Bicarbonate 30 21 - 32 mmol/L    Anion Gap 15 10 - 20 mmol/L    Urea Nitrogen 25 (H) 6 - 23 mg/dL    Creatinine 1.12 (H) 0.50 - 1.05 mg/dL    eGFR 53 (L) >60 mL/min/1.73m*2    Calcium 9.4 8.6 - 10.3 mg/dL   CBC   Result Value Ref Range    WBC 17.0 (H) 4.4 - 11.3 x10*3/uL    nRBC 0.0 0.0 - 0.0 /100 WBCs    RBC 4.58 4.00 - 5.20 x10*6/uL    Hemoglobin 13.0 12.0 - 16.0 g/dL    Hematocrit 40.1  36.0 - 46.0 %    MCV 88 80 - 100 fL    MCH 28.4 26.0 - 34.0 pg    MCHC 32.4 32.0 - 36.0 g/dL    RDW 13.7 11.5 - 14.5 %    Platelets 319 150 - 450 x10*3/uL          Assessment/Plan      Assessment & Plan  Anxiety    RSV (acute bronchiolitis due to respiratory syncytial virus)    Hypothyroidism    RSV infection    RLS (restless legs syndrome)    Tobacco use    Wheezing    Shortness of breath    #RSV (acute bronchiolitis due to respiratory syncytial virus)  #Acute hypoxic respiratory failure   #Tobacco use disorder  #COPD exacerbation  -discharged home, worsened the next day, severe wheezing, sore throat, barking cough  -PFT: Spirometry shows severe obstruction with small airways bronchodilator response. Lung volumes show air trapping. Diffusing capacity is normal.  -CT PE: No gross central PE however limited assessment for peripheral PE due to technical factors.   Slight worsening of bilateral nodular/micronodular infiltrates since 3 days prior. Unchanged dominant irregular masslike opacity in the left lung base, reportedly hypermetabolic on prior PET scan and concerning for malignancy, and mild lymphadenopathy. Further evaluation/continued follow-up recommended.   -Planned to have biopsy with Dr. Larkin on  for lung nodule, cancelled due to illness   -WBC 11.8 > 9.7 > 17.0 (on steroids)  -VB.42/38/42/24.6  -continue duonebs  -continue IV solumedrol q8  -discontinue IV levaquin, day 1 > transitioned to IV vanc, zosyn per Dat (Day 1)  -Procal 0.03; discontinue abx per ID recs   -RT consult, bronchial hygiene  -ID consult, recs appreciated  -supportive care     #HLD  #Angina  #Aortic Insufficiency, severe  -Echo 2023: 1. Left ventricular systolic function is normal with a 55-60% estimated ejection fraction.  2. Spectral Doppler shows an impaired relaxation pattern of left ventricular diastolic filling. 3. Moderate to severe aortic valve regurgitation. 4. There is mild mitral and tricuspid regurgitation.  5.  The estimated pulmonary artery pressure is mildly elevated with the RVSP at 42.5 mmHg.  -  -troponin 19 > 5  -sees Dr Dela Cruz  -continue torsemide     #Hypothyroidism  -continue levothyroxine     #RLS (restless legs syndrome)  -continue ropinirole     #Anxiety  -continue alprazolam as needed     DVT ppx  -lovenox      PUD ppx  -pantoprazole     F: PRN  E: Replete per protocol  N: Regular  A: PIV     Disposition: Pt requires more than 2 inpatient days at this time   Code Status: Full Code        Beena Wan, APRN-CNP

## 2024-11-28 NOTE — PROGRESS NOTES
Vancomycin Dosing by Pharmacy- Cessation of Therapy    Consult to pharmacy for vancomycin dosing has been discontinued by the prescriber, pharmacy will sign off at this time.    Please call pharmacy if there are further questions or re-enter a consult if vancomycin is resumed.     Fabian Resendiz, PharmD

## 2024-11-28 NOTE — PROGRESS NOTES
Chantelle Rao is a 69 y.o. female on day 1 of admission presenting with RSV infection.    Subjective   Interval History:   Afebrile, no chills  Occasional mild nonproductive cough  No chest pain  On low-flow oxygen  No nausea vomiting or diarrhea        Review of Systems   All other systems reviewed and are negative.      Objective   Range of Vitals (last 24 hours)  Heart Rate:  [78-96]   Temp:  [36 °C (96.8 °F)-36.8 °C (98.2 °F)]   Resp:  [17-18]   BP: (111-116)/(64-65)   SpO2:  [87 %-97 %]   Daily Weight  11/26/24 : 74.7 kg (164 lb 10.9 oz)    Body mass index is 28.27 kg/m².    Physical Exam  Constitutional:       Appearance: Normal appearance.   HENT:      Head: Normocephalic and atraumatic.      Nose: Nose normal.   Eyes:      General: No scleral icterus.     Extraocular Movements: Extraocular movements intact.      Conjunctiva/sclera: Conjunctivae normal.   Cardiovascular:      Rate and Rhythm: Normal rate and regular rhythm.      Heart sounds: Normal heart sounds.   Pulmonary:      Breath sounds: Wheezing present.   Abdominal:      General: Bowel sounds are normal.      Palpations: Abdomen is soft.      Tenderness: There is no abdominal tenderness.   Musculoskeletal:      Cervical back: Normal range of motion and neck supple.      Right lower leg: No edema.      Left lower leg: No edema.   Skin:     General: Skin is warm and dry.   Neurological:      Mental Status: She is alert and oriented to person, place, and time.   Psychiatric:         Behavior: Behavior normal.     Antibiotics  cefuroxime - 500 mg    Relevant Results  Labs  Results from last 72 hours   Lab Units 11/28/24  0646 11/27/24  0635 11/26/24  1511   WBC AUTO x10*3/uL 17.0* 9.7 11.8*   HEMOGLOBIN g/dL 13.0 12.7 13.5   HEMATOCRIT % 40.1 39.2 40.7   PLATELETS AUTO x10*3/uL 319 286 299   NEUTROS PCT AUTO %  --   --  65.0   LYMPHS PCT AUTO %  --   --  21.2   MONOS PCT AUTO %  --   --  9.6   EOS PCT AUTO %  --   --  1.0     Results from last 72 hours    Lab Units 11/28/24  0645 11/27/24  0635 11/26/24  1511   SODIUM mmol/L 138 140 139   POTASSIUM mmol/L 3.3* 3.9 3.5   CHLORIDE mmol/L 96* 100 105   CO2 mmol/L 30 28 24   BUN mg/dL 25* 17 18   CREATININE mg/dL 1.12* 0.88 0.89   GLUCOSE mg/dL 125* 133* 83   CALCIUM mg/dL 9.4 9.1 9.0   ANION GAP mmol/L 15 16 14   EGFR mL/min/1.73m*2 53* 71 70     Results from last 72 hours   Lab Units 11/26/24  1511   ALK PHOS U/L 99   BILIRUBIN TOTAL mg/dL 0.3   PROTEIN TOTAL g/dL 7.3   ALT U/L 28   AST U/L 22   ALBUMIN g/dL 3.9     Estimated Creatinine Clearance: 46.9 mL/min (A) (by C-G formula based on SCr of 1.12 mg/dL (H)).  C-Reactive Protein   Date Value Ref Range Status   10/08/2024 0.93 <1.00 mg/dL Final     Microbiology  Susceptibility data from last 14 days.  Collected Specimen Info Organism   11/24/24 Fluid from SPUTUM Normal throat laura     Imaging  ECG 12 lead    Result Date: 11/27/2024  Normal sinus rhythm Normal ECG When compared with ECG of 23-NOV-2024 19:29, Nonspecific T wave abnormality no longer evident in Anterolateral leads See ED provider note for full interpretation and clinical correlation Confirmed by Awilda Morales ([id number]) on 11/27/2024 4:25:34 PM    CT angio chest for pulmonary embolism    Result Date: 11/26/2024  Interpreted By:  Sunni Newsome, STUDY: CT ANGIO CHEST FOR PULMONARY EMBOLISM;  11/26/2024 3:56 pm   INDICATION: Signs/Symptoms:r/o PE.     COMPARISON: 11/23/2024   ACCESSION NUMBER(S): [id number]   ORDERING CLINICIAN: LIBRADO GROVER   TECHNIQUE: CT of the chest was performed. Sagittal and coronal reconstructions were generated. 75 ML Omnipaque 350 intravenous contrast given for the examination.  Multiplanar reconstructions of the pulmonary vessels were created on an independent workstation and provided for review.   FINDINGS:     CHEST WALL AND LOWER NECK: No significant axillary lymphadenopathy.   MEDIASTINUM AND CHRISS:  Gaseous distention of the proximal to mid esophagus and mild  esophageal wall thickening similar to the prior exam. 1.1 cm AP window node image 114/324. 1.1 cm right hilar node image 140/324. Partially calcified 1.2 cm subcarinal node. These are similar to the previous exam.   HEART AND VESSELS:  No gross central PE however limited assessment for segmental and subsegmental/peripheral PE due to suboptimal timing of IV contrast bolus. The heart is normal in size. No significant pericardial effusion. Atherosclerotic calcifications including the coronary arteries. 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   LUNGS, PLEURA, LARGE AIRWAYS:  Bilateral nodular/micronodular infiltrates slightly increased since the previous exam. Dominant irregular 2 cm opacity in the left lung base image 230/324 is similar to the prior study. No significant pleural effusion. The central airways are patent.   UPPER ABDOMEN:  No focal lesion in the included upper abdominal viscera.   BONES:  Degenerative changes of the visualized spine.       No gross central PE however limited assessment for peripheral PE due to technical factors.   Slight worsening of bilateral nodular/micronodular infiltrates since 3 days prior. Unchanged dominant irregular masslike opacity in the left lung base, reportedly hypermetabolic on prior PET scan and concerning for malignancy, and mild lymphadenopathy. Further evaluation/continued follow-up recommended.   MACRO: None.   Signed by: Sunni Newsome 11/26/2024 4:50 PM Dictation workstation:   [id number]    ECG 12 lead    Result Date: 11/26/2024  Sinus tachycardia Nonspecific ST and T wave abnormality Abnormal ECG When compared with ECG of 23-NOV-2024 16:33, (unconfirmed) No significant change was found See ED provider note for full interpretation and clinical correlation Confirmed by Lashawn Lancaster ([id number]) on 11/26/2024 4:33:51 PM    ECG 12 lead    Result Date: 11/26/2024  Sinus tachycardia Possible Left atrial enlargement Rightward axis Possible Inferior  infarct (cited on or before 19-APR-2023) Abnormal ECG When compared with ECG of 19-APR-2023 11:03, Nonspecific T wave abnormality now evident in Anterolateral leads See ED provider note for full interpretation and clinical correlation Confirmed by Lashawn Lancaster ([id number]) on 11/26/2024 4:29:20 PM    Complete Pulmonary Function Test Pre/Post Bronchodialator (Spirometry Pre/Post/DLCO/Lung Volumes)    Result Date: 11/25/2024  Spirometry shows severe obstruction with small airways bronchodilator response. Lung volumes show air trapping. Diffusing capacity is normal.    CT head wo IV contrast    Result Date: 11/24/2024  Interpreted By:  Brenden Marquis, STUDY: CT HEAD WO IV CONTRAST;  11/24/2024 9:50 am   INDICATION: Signs/Symptoms:headaches.     COMPARISON: None.   ACCESSION NUMBER(S): [id number]   ORDERING CLINICIAN: JIMMY STEARNS   TECHNIQUE: Noncontrast axial CT scan of head was performed. Angled reformats in brain and bone windows were generated. The images were reviewed in bone, brain, blood and soft tissue windows.   FINDINGS: CSF Spaces: The ventricles, sulci and basal cisterns are within normal limits. There is no abnormal extraaxial fluid collection.   Parenchyma:  The grey-white differentiation is intact. There is no mass effect or midline shift.  There is no acute intracranial hemorrhage.   Calvarium: The calvarium is unremarkable.   Paranasal sinuses and mastoids: There is mild mucosal thickening located within the visualized right sphenoid sinus, maxillary sinuses, and ethmoid air cells. Mastoid air cells are clear.       No acute intracranial abnormality or mass effect.   This study was interpreted at [facility].   MACRO: None   Signed by: Brenden Marquis 11/24/2024 3:07 PM Dictation workstation:   [id number]    CT angio chest for pulmonary embolism    Result Date: 11/23/2024  Interpreted By:  Gabo Bob, STUDY: CT ANGIO CHEST FOR PULMONARY EMBOLISM;  11/23/2024 7:14 pm    INDICATION: Signs/Symptoms:chest pain with elevated d dimer.   COMPARISON: 03/26/2024, PET imaging 10/30/2024   ACCESSION NUMBER(S): [id number]   ORDERING CLINICIAN: COMFORT JAVIER   TECHNIQUE: Contiguous axial images of the chest were obtained after the intravenous administration of iodinated contrast using angiographic PE protocol. Coronal and sagittal reformatted images were reconstructed from the axial data. MIP images were created on an independent workstation and reviewed.   FINDINGS: Suboptimal study using low-dose setting.   No significant pulmonary embolism given limitations.   Heart size within normal limits.   No significant adenopathy suspected. Evaluation of the lungs limited due to respiratory motion. Redemonstrated is a elongated nodular density in the superior segment of the right lower lobe which again measures up to approximately 16 mm in diameter not significantly changed. Calcified granuloma redemonstrated right upper lobe.   There is a 2 cm area of nodular consolidation in the left lung base which has not significantly changed. This was hypermetabolic on prior PET imaging 10/30/2024 and is suspicious for malignancy.   No new consolidation or other significant new pulmonary mass.   Spondylotic degeneration is redemonstrated.       Suboptimal low-dose imaging technique limits evaluation. No significant central pulmonary embolism.   Left lung base mass again noted consistent with known/suspected hypermetabolic malignancy. Correlate with patient history.   MACRO: None.   Signed by: Gabo Bob 11/23/2024 7:55 PM Dictation workstation:   [id number]    XR chest 1 view    Result Date: 11/23/2024  Interpreted By:  Schoenberger, Joseph, STUDY: XR CHEST 1 VIEW;  11/23/2024 4:53 pm   INDICATION: Signs/Symptoms:Chest Pain.     COMPARISON: 01/15/2024   ACCESSION NUMBER(S): [id number]   ORDERING CLINICIAN: COMFORT JAVIER   FINDINGS:         CARDIOMEDIASTINAL SILHOUETTE: Cardiomediastinal silhouette  is normal in size and configuration.   LUNGS: No definite new focal lung opacity. Coarsened lung markings unchanged. Tiny nodule in the periphery of the right mid lung is unchanged.   ABDOMEN: No remarkable upper abdominal findings.   BONES: No acute osseous changes.       1.  No evidence of acute cardiopulmonary process. Stable chest       MACRO: None   Signed by: Joseph Schoenberger 11/23/2024 5:05 PM Dictation workstation:   [id number]    Pulmonary Stress Test (6 Min. Walk)    Result Date: 11/7/2024  The patient had no significant desaturation walking in room air.  Sukh saturation 93%.    NM PET CT FDG wholebody    Result Date: 10/30/2024  Interpreted By:  Froylan Oliver  and Yohana Dean STUDY: NM PET CT FDG WHOLEBODY;  10/30/2024 9:30 am   INDICATION: Signs/Symptoms:previous PET CT 2020 shows lung malignancy. Pt had no follow up. liver and lung density. Symptomatic.. ,D72.829 Elevated white blood cell count, unspecified,R79.89 Other specified abnormal findings of blood chemistry,R91.8 Other nonspecific abnormal finding of lung field,K76.9 Liver disease, unspecified,C34.90 Malignant neoplasm of unspecified part of unspecified bronchus or lung (Multi)   COMPARISON: 03/26/2024 CT chest and 03/08/2024 CT abdomen pelvis   ACCESSION NUMBER(S): [id number]   ORDERING CLINICIAN: RAINA LUNDY   TECHNIQUE: DIVISION OF NUCLEAR MEDICINE POSITRON EMISSION TOMOGRAPHY (PET-CT)   The patient received an intravenous dose of 12.8 mCi of Fluorine-18 fluorodeoxyglucose (FDG).  Positron emission tomographic (PET) images from mid thigh to skull base were then acquired after a one hour delay. Also acquired was a contemporaneous low dose non-contrast CT scan performed for attenuation correction of PET images and anatomic localization.  The PET and CT images were digitally fused for display.  All images were acquired on a combined PET-CT scanner unit. Some areas of FDG accumulation may be described in standardized uptake value  (SUV) units.   CODING: Initial Treatment Strategy (PI)   CALIBRATION: Dose Injection-to-Scan Interval (mins): 59 min Mediastinal bloodpool SUV (normal 1.5-2.5): 2.1 Blood glucose: 115 mg/dL   FINDINGS: HEAD AND NECK: No evidence of focal FDG avid lesion in the partially visualized brain parenchyma, noting that evaluation is limited because of the expected physiologic diffuse FDG uptake in the brain. No focal FDG avid  soft tissue lesion is seen in the neck. No FDG avid  cervical lymphadenopathy is present.     CHEST: Hypermetabolic left lung base nodule demonstrates max SUV of 6.1. A more superior mildly FDG avid left lower lobe calcified granuloma demonstrates max SUV 4.1.   Few right-sided nodular opacities are only minimally FDG avid. For example: Posterolateral right lower lobe nodular opacity with max SUV 1.2. Right upper lobe calcified nodule with max SUV 1.0.     A few mildly FDG avid mediastinal and bilateral hilar lymph nodes are seen. For example: Right hilar node with max SUV of 3.5. Left hilar node with max SUV of 3.4. Subcarinal node with max SUV 2.8.   ABDOMEN AND PELVIS: No FDG avid  soft tissue lesion is present in the abdomen and pelvis. No evidence of FDG avid  lymphadenopathy. Physiologic radiotracer uptake is present in the liver and spleen with excretion into the bowel loops and the genitourinary tract.   MUSCULOSKELETAL: No focal FDG avid  lesion is seen in the axial or appendicular to suggest osseous metastasis. Hypermetabolic activity at the right aspect of the T8 vertebral body is likely degenerative in etiology given prominent osteophyte. A right hip arthroplasties present.       1. Hypermetabolic left lung base nodule suspicious for malignancy. Tissue sampling is recommended. 2. Additional bilateral pulmonary nodules, many of which demonstrate calcification and exhibit minimal to mild FDG avidity are likely benign. Continued attention on follow-up chest CT is recommended. 3. Nonspecific  mildly hypermetabolic mediastinal and bilateral hilar lymph nodes are favored to be reactive.     I personally reviewed the images/study and I agree with the resident Jermaine Muller's findings as stated. This study was interpreted at [place], [place].   Signed by: Froylan Oliver 10/30/2024 10:53 AM Dictation workstation:   [id number]     Assessment/Plan   Acute hypoxic respiratory failure  RSV, clinically improving  Abnormal CT-RSV pneumonitis, versus evolving pneumonia-negative MRSA PCR  Pulmonary nodules     Discontinue Zosyn-negative procalcitonin  Oxygen as needed  Contact precautions  Follow-up mycoplasma serology  Supportive care  Monitor temperature and WBC  Discharge planning    Jori Sanchez MD

## 2024-11-28 NOTE — CARE PLAN
The patient's goals for the shift include      The clinical goals for the shift include Wean oxygen and maintain SpO2 > 90%      Problem: Fall/Injury  Goal: Not fall by end of shift  Outcome: Progressing  Goal: Be free from injury by end of the shift  Outcome: Progressing  Goal: Verbalize understanding of personal risk factors for fall in the hospital  Outcome: Progressing  Goal: Verbalize understanding of risk factor reduction measures to prevent injury from fall in the home  Outcome: Progressing  Goal: Use assistive devices by end of the shift  Outcome: Progressing  Goal: Pace activities to prevent fatigue by end of the shift  Outcome: Progressing

## 2024-11-29 VITALS
TEMPERATURE: 97.5 F | HEART RATE: 111 BPM | RESPIRATION RATE: 17 BRPM | OXYGEN SATURATION: 93 % | SYSTOLIC BLOOD PRESSURE: 149 MMHG | BODY MASS INDEX: 28.12 KG/M2 | HEIGHT: 64 IN | WEIGHT: 164.68 LBS | DIASTOLIC BLOOD PRESSURE: 92 MMHG

## 2024-11-29 LAB
ANION GAP SERPL CALC-SCNC: 17 MMOL/L (ref 10–20)
BUN SERPL-MCNC: 28 MG/DL (ref 6–23)
CALCIUM SERPL-MCNC: 9.6 MG/DL (ref 8.6–10.3)
CHLORIDE SERPL-SCNC: 91 MMOL/L (ref 98–107)
CO2 SERPL-SCNC: 34 MMOL/L (ref 21–32)
CREAT SERPL-MCNC: 1.09 MG/DL (ref 0.5–1.05)
EGFRCR SERPLBLD CKD-EPI 2021: 55 ML/MIN/1.73M*2
ERYTHROCYTE [DISTWIDTH] IN BLOOD BY AUTOMATED COUNT: 13.7 % (ref 11.5–14.5)
GLUCOSE SERPL-MCNC: 118 MG/DL (ref 74–99)
HCT VFR BLD AUTO: 43.6 % (ref 36–46)
HGB BLD-MCNC: 14.5 G/DL (ref 12–16)
MCH RBC QN AUTO: 29 PG (ref 26–34)
MCHC RBC AUTO-ENTMCNC: 33.3 G/DL (ref 32–36)
MCV RBC AUTO: 87 FL (ref 80–100)
NRBC BLD-RTO: 0 /100 WBCS (ref 0–0)
PLATELET # BLD AUTO: 373 X10*3/UL (ref 150–450)
POTASSIUM SERPL-SCNC: 3.1 MMOL/L (ref 3.5–5.3)
RBC # BLD AUTO: 5 X10*6/UL (ref 4–5.2)
SODIUM SERPL-SCNC: 139 MMOL/L (ref 136–145)
WBC # BLD AUTO: 14.2 X10*3/UL (ref 4.4–11.3)

## 2024-11-29 PROCEDURE — 94762 N-INVAS EAR/PLS OXIMTRY CONT: CPT | Mod: IPSPLIT

## 2024-11-29 PROCEDURE — 80048 BASIC METABOLIC PNL TOTAL CA: CPT | Mod: IPSPLIT

## 2024-11-29 PROCEDURE — 36415 COLL VENOUS BLD VENIPUNCTURE: CPT | Mod: IPSPLIT

## 2024-11-29 PROCEDURE — 2500000001 HC RX 250 WO HCPCS SELF ADMINISTERED DRUGS (ALT 637 FOR MEDICARE OP): Mod: IPSPLIT | Performed by: STUDENT IN AN ORGANIZED HEALTH CARE EDUCATION/TRAINING PROGRAM

## 2024-11-29 PROCEDURE — 94640 AIRWAY INHALATION TREATMENT: CPT | Mod: IPSPLIT

## 2024-11-29 PROCEDURE — 2500000001 HC RX 250 WO HCPCS SELF ADMINISTERED DRUGS (ALT 637 FOR MEDICARE OP): Mod: IPSPLIT

## 2024-11-29 PROCEDURE — 2500000004 HC RX 250 GENERAL PHARMACY W/ HCPCS (ALT 636 FOR OP/ED): Mod: IPSPLIT

## 2024-11-29 PROCEDURE — 9420000001 HC RT PATIENT EDUCATION 5 MIN: Mod: IPSPLIT

## 2024-11-29 PROCEDURE — 85027 COMPLETE CBC AUTOMATED: CPT | Mod: IPSPLIT

## 2024-11-29 PROCEDURE — 99239 HOSP IP/OBS DSCHRG MGMT >30: CPT | Performed by: NURSE PRACTITIONER

## 2024-11-29 PROCEDURE — 2500000002 HC RX 250 W HCPCS SELF ADMINISTERED DRUGS (ALT 637 FOR MEDICARE OP, ALT 636 FOR OP/ED): Mod: IPSPLIT | Performed by: NURSE PRACTITIONER

## 2024-11-29 PROCEDURE — 2500000002 HC RX 250 W HCPCS SELF ADMINISTERED DRUGS (ALT 637 FOR MEDICARE OP, ALT 636 FOR OP/ED): Mod: IPSPLIT

## 2024-11-29 PROCEDURE — 94761 N-INVAS EAR/PLS OXIMETRY MLT: CPT | Mod: IPSPLIT

## 2024-11-29 RX ORDER — POTASSIUM CHLORIDE 20 MEQ/1
40 TABLET, EXTENDED RELEASE ORAL ONCE
Status: COMPLETED | OUTPATIENT
Start: 2024-11-29 | End: 2024-11-29

## 2024-11-29 RX ORDER — LEVALBUTEROL INHALATION SOLUTION 0.63 MG/3ML
0.63 SOLUTION RESPIRATORY (INHALATION) ONCE
Status: COMPLETED | OUTPATIENT
Start: 2024-11-29 | End: 2024-11-29

## 2024-11-29 RX ORDER — LEVALBUTEROL INHALATION SOLUTION 0.63 MG/3ML
SOLUTION RESPIRATORY (INHALATION)
Status: COMPLETED
Start: 2024-11-29 | End: 2024-11-29

## 2024-11-29 ASSESSMENT — PAIN SCALES - GENERAL: PAINLEVEL_OUTOF10: 0 - NO PAIN

## 2024-11-29 ASSESSMENT — COGNITIVE AND FUNCTIONAL STATUS - GENERAL
DAILY ACTIVITIY SCORE: 24
MOBILITY SCORE: 24

## 2024-11-29 NOTE — CARE PLAN
The patient's goals for the shift include get some rest    The clinical goals for the shift include pt will remain on room air and be 90% or higher during this shift.    Over the shift, the patient was not able to stay off oxygen without dipping in the mid 80's spo2. Pt able to ambulate to bathroom and use call light for assistance. Pt on 2L NC.

## 2024-11-29 NOTE — CARE PLAN
Respiratory Therapy Note     Patient is 89-90% on room air. Ambulated patient on room air. Patient desaturated to 85% . Patient placed on 2 LPM patient able to maintain 92% .    Patient set up with [facility].

## 2024-11-29 NOTE — CARE PLAN
RT has completed the requested/ordered overnight trend on room air and patient does qualify for 2L of O2 at HS with her discharge.    Total Time Below: 26min 27sec  Longest Duration: 23min 7sec  Lowest SpO2: 80% at 11/28/24 @ 11:23:04 PM  Number of Events: 14

## 2024-11-29 NOTE — PROGRESS NOTES
Chantelle Rao is a 69 y.o. female on day 2 of admission presenting with RSV infection.    Subjective   Interval History:   Patient seen and examined  Afebrile, no reported chills  Reports Occasional mild nonproductive cough  No chest pain  Remains On low-flow oxygen  Denies nausea vomiting or diarrhea        Objective   Range of Vitals (last 24 hours)  Heart Rate:  [90-96]   Temp:  [36.8 °C (98.2 °F)-37 °C (98.6 °F)]   Resp:  [16-17]   BP: (140-153)/(86-89)   SpO2:  [89 %-97 %]   Daily Weight  11/26/24 : 74.7 kg (164 lb 10.9 oz)    Body mass index is 28.27 kg/m².    Physical Exam  Constitutional:       Appearance: Normal appearance.   HENT:      Head: Normocephalic and atraumatic.      Nose: Nose normal.   Eyes:      General: No scleral icterus.     Extraocular Movements: Extraocular movements intact.      Conjunctiva/sclera: Conjunctivae normal.   Cardiovascular:      Rate and Rhythm: Normal rate and regular rhythm.      Heart sounds: Normal heart sounds.   Pulmonary:      Breath sounds: Wheezing present.   Abdominal:      General: Bowel sounds are normal.      Palpations: Abdomen is soft.      Tenderness: There is no abdominal tenderness.   Musculoskeletal:      Cervical back: Normal range of motion and neck supple.      Right lower leg: No edema.      Left lower leg: No edema.   Skin:     General: Skin is warm and dry.   Neurological:      Mental Status: She is alert and oriented to person, place, and time.   Psychiatric:         Behavior: Behavior normal.     Antibiotics  cefuroxime - 500 mg    Relevant Results  Labs  Results from last 72 hours   Lab Units 11/29/24  0641 11/28/24  0646 11/27/24  0635 11/26/24  1511   WBC AUTO x10*3/uL 14.2* 17.0* 9.7 11.8*   HEMOGLOBIN g/dL 14.5 13.0 12.7 13.5   HEMATOCRIT % 43.6 40.1 39.2 40.7   PLATELETS AUTO x10*3/uL 373 319 286 299   NEUTROS PCT AUTO %  --   --   --  65.0   LYMPHS PCT AUTO %  --   --   --  21.2   MONOS PCT AUTO %  --   --   --  9.6   EOS PCT AUTO %  --   --    --  1.0     Results from last 72 hours   Lab Units 11/29/24  0641 11/28/24  0645 11/27/24  0635   SODIUM mmol/L 139 138 140   POTASSIUM mmol/L 3.1* 3.3* 3.9   CHLORIDE mmol/L 91* 96* 100   CO2 mmol/L 34* 30 28   BUN mg/dL 28* 25* 17   CREATININE mg/dL 1.09* 1.12* 0.88   GLUCOSE mg/dL 118* 125* 133*   CALCIUM mg/dL 9.6 9.4 9.1   ANION GAP mmol/L 17 15 16   EGFR mL/min/1.73m*2 55* 53* 71     Results from last 72 hours   Lab Units 11/26/24  1511   ALK PHOS U/L 99   BILIRUBIN TOTAL mg/dL 0.3   PROTEIN TOTAL g/dL 7.3   ALT U/L 28   AST U/L 22   ALBUMIN g/dL 3.9     Estimated Creatinine Clearance: 48.2 mL/min (A) (by C-G formula based on SCr of 1.09 mg/dL (H)).  C-Reactive Protein   Date Value Ref Range Status   10/08/2024 0.93 <1.00 mg/dL Final     Microbiology  Susceptibility data from last 14 days.  Collected Specimen Info Organism   11/24/24 Fluid from SPUTUM Normal throat laura     Imaging  ECG 12 lead    Result Date: 11/27/2024  Normal sinus rhythm Normal ECG When compared with ECG of 23-NOV-2024 19:29, Nonspecific T wave abnormality no longer evident in Anterolateral leads See ED provider note for full interpretation and clinical correlation Confirmed by Awilda Morales ([id number]) on 11/27/2024 4:25:34 PM    CT angio chest for pulmonary embolism    Result Date: 11/26/2024  Interpreted By:  Sunni Newsome, STUDY: CT ANGIO CHEST FOR PULMONARY EMBOLISM;  11/26/2024 3:56 pm   INDICATION: Signs/Symptoms:r/o PE.     COMPARISON: 11/23/2024   ACCESSION NUMBER(S): [id number]   ORDERING CLINICIAN: LIBRADO GROVER   TECHNIQUE: CT of the chest was performed. Sagittal and coronal reconstructions were generated. 75 ML Omnipaque 350 intravenous contrast given for the examination.  Multiplanar reconstructions of the pulmonary vessels were created on an independent workstation and provided for review.   FINDINGS:     CHEST WALL AND LOWER NECK: No significant axillary lymphadenopathy.   MEDIASTINUM AND CHRISS:  Gaseous  distention of the proximal to mid esophagus and mild esophageal wall thickening similar to the prior exam. 1.1 cm AP window node image 114/324. 1.1 cm right hilar node image 140/324. Partially calcified 1.2 cm subcarinal node. These are similar to the previous exam.   HEART AND VESSELS:  No gross central PE however limited assessment for segmental and subsegmental/peripheral PE due to suboptimal timing of IV contrast bolus. The heart is normal in size. No significant pericardial effusion. Atherosclerotic calcifications including the coronary arteries. 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   LUNGS, PLEURA, LARGE AIRWAYS:  Bilateral nodular/micronodular infiltrates slightly increased since the previous exam. Dominant irregular 2 cm opacity in the left lung base image 230/324 is similar to the prior study. No significant pleural effusion. The central airways are patent.   UPPER ABDOMEN:  No focal lesion in the included upper abdominal viscera.   BONES:  Degenerative changes of the visualized spine.       No gross central PE however limited assessment for peripheral PE due to technical factors.   Slight worsening of bilateral nodular/micronodular infiltrates since 3 days prior. Unchanged dominant irregular masslike opacity in the left lung base, reportedly hypermetabolic on prior PET scan and concerning for malignancy, and mild lymphadenopathy. Further evaluation/continued follow-up recommended.   MACRO: None.   Signed by: Sunni Newsome 11/26/2024 4:50 PM Dictation workstation:   [id number]    ECG 12 lead    Result Date: 11/26/2024  Sinus tachycardia Nonspecific ST and T wave abnormality Abnormal ECG When compared with ECG of 23-NOV-2024 16:33, (unconfirmed) No significant change was found See ED provider note for full interpretation and clinical correlation Confirmed by Lashawn Lancaster ([id number]) on 11/26/2024 4:33:51 PM    ECG 12 lead    Result Date: 11/26/2024  Sinus tachycardia Possible  Left atrial enlargement Rightward axis Possible Inferior infarct (cited on or before 19-APR-2023) Abnormal ECG When compared with ECG of 19-APR-2023 11:03, Nonspecific T wave abnormality now evident in Anterolateral leads See ED provider note for full interpretation and clinical correlation Confirmed by Lashawn Lancaster ([id number]) on 11/26/2024 4:29:20 PM    Complete Pulmonary Function Test Pre/Post Bronchodialator (Spirometry Pre/Post/DLCO/Lung Volumes)    Result Date: 11/25/2024  Spirometry shows severe obstruction with small airways bronchodilator response. Lung volumes show air trapping. Diffusing capacity is normal.    CT head wo IV contrast    Result Date: 11/24/2024  Interpreted By:  Brenden Marquis, STUDY: CT HEAD WO IV CONTRAST;  11/24/2024 9:50 am   INDICATION: Signs/Symptoms:headaches.     COMPARISON: None.   ACCESSION NUMBER(S): [id number]   ORDERING CLINICIAN: JIMMY STEARNS   TECHNIQUE: Noncontrast axial CT scan of head was performed. Angled reformats in brain and bone windows were generated. The images were reviewed in bone, brain, blood and soft tissue windows.   FINDINGS: CSF Spaces: The ventricles, sulci and basal cisterns are within normal limits. There is no abnormal extraaxial fluid collection.   Parenchyma:  The grey-white differentiation is intact. There is no mass effect or midline shift.  There is no acute intracranial hemorrhage.   Calvarium: The calvarium is unremarkable.   Paranasal sinuses and mastoids: There is mild mucosal thickening located within the visualized right sphenoid sinus, maxillary sinuses, and ethmoid air cells. Mastoid air cells are clear.       No acute intracranial abnormality or mass effect.   This study was interpreted at [facility].   MACRO: None   Signed by: Brenden Marquis 11/24/2024 3:07 PM Dictation workstation:   [id number]    CT angio chest for pulmonary embolism    Result Date: 11/23/2024  Interpreted By:  Gabo Bob, STUDY: CT  ANGIO CHEST FOR PULMONARY EMBOLISM;  11/23/2024 7:14 pm   INDICATION: Signs/Symptoms:chest pain with elevated d dimer.   COMPARISON: 03/26/2024, PET imaging 10/30/2024   ACCESSION NUMBER(S): [id number]   ORDERING CLINICIAN: COMFORT JAVIER   TECHNIQUE: Contiguous axial images of the chest were obtained after the intravenous administration of iodinated contrast using angiographic PE protocol. Coronal and sagittal reformatted images were reconstructed from the axial data. MIP images were created on an independent workstation and reviewed.   FINDINGS: Suboptimal study using low-dose setting.   No significant pulmonary embolism given limitations.   Heart size within normal limits.   No significant adenopathy suspected. Evaluation of the lungs limited due to respiratory motion. Redemonstrated is a elongated nodular density in the superior segment of the right lower lobe which again measures up to approximately 16 mm in diameter not significantly changed. Calcified granuloma redemonstrated right upper lobe.   There is a 2 cm area of nodular consolidation in the left lung base which has not significantly changed. This was hypermetabolic on prior PET imaging 10/30/2024 and is suspicious for malignancy.   No new consolidation or other significant new pulmonary mass.   Spondylotic degeneration is redemonstrated.       Suboptimal low-dose imaging technique limits evaluation. No significant central pulmonary embolism.   Left lung base mass again noted consistent with known/suspected hypermetabolic malignancy. Correlate with patient history.   MACRO: None.   Signed by: Gabo Bob 11/23/2024 7:55 PM Dictation workstation:   [id number]    XR chest 1 view    Result Date: 11/23/2024  Interpreted By:  Schoenberger, Joseph, STUDY: XR CHEST 1 VIEW;  11/23/2024 4:53 pm   INDICATION: Signs/Symptoms:Chest Pain.     COMPARISON: 01/15/2024   ACCESSION NUMBER(S): [id number]   ORDERING CLINICIAN: COMFORT JAVIER   FINDINGS:          CARDIOMEDIASTINAL SILHOUETTE: Cardiomediastinal silhouette is normal in size and configuration.   LUNGS: No definite new focal lung opacity. Coarsened lung markings unchanged. Tiny nodule in the periphery of the right mid lung is unchanged.   ABDOMEN: No remarkable upper abdominal findings.   BONES: No acute osseous changes.       1.  No evidence of acute cardiopulmonary process. Stable chest       MACRO: None   Signed by: Joseph Schoenberger 11/23/2024 5:05 PM Dictation workstation:   [id number]    Pulmonary Stress Test (6 Min. Walk)    Result Date: 11/7/2024  The patient had no significant desaturation walking in room air.  Sukh saturation 93%.    NM PET CT FDG wholebody    Result Date: 10/30/2024  Interpreted By:  Froylan Oliver and Hanreck James STUDY: NM PET CT FDG WHOLEBODY;  10/30/2024 9:30 am   INDICATION: Signs/Symptoms:previous PET CT 2020 shows lung malignancy. Pt had no follow up. liver and lung density. Symptomatic.. ,D72.829 Elevated white blood cell count, unspecified,R79.89 Other specified abnormal findings of blood chemistry,R91.8 Other nonspecific abnormal finding of lung field,K76.9 Liver disease, unspecified,C34.90 Malignant neoplasm of unspecified part of unspecified bronchus or lung (Multi)   COMPARISON: 03/26/2024 CT chest and 03/08/2024 CT abdomen pelvis   ACCESSION NUMBER(S): [id number]   ORDERING CLINICIAN: RAINA LUNDY   TECHNIQUE: DIVISION OF NUCLEAR MEDICINE POSITRON EMISSION TOMOGRAPHY (PET-CT)   The patient received an intravenous dose of 12.8 mCi of Fluorine-18 fluorodeoxyglucose (FDG).  Positron emission tomographic (PET) images from mid thigh to skull base were then acquired after a one hour delay. Also acquired was a contemporaneous low dose non-contrast CT scan performed for attenuation correction of PET images and anatomic localization.  The PET and CT images were digitally fused for display.  All images were acquired on a combined PET-CT scanner unit. Some areas of FDG  accumulation may be described in standardized uptake value (SUV) units.   CODING: Initial Treatment Strategy (PI)   CALIBRATION: Dose Injection-to-Scan Interval (mins): 59 min Mediastinal bloodpool SUV (normal 1.5-2.5): 2.1 Blood glucose: 115 mg/dL   FINDINGS: HEAD AND NECK: No evidence of focal FDG avid lesion in the partially visualized brain parenchyma, noting that evaluation is limited because of the expected physiologic diffuse FDG uptake in the brain. No focal FDG avid  soft tissue lesion is seen in the neck. No FDG avid  cervical lymphadenopathy is present.     CHEST: Hypermetabolic left lung base nodule demonstrates max SUV of 6.1. A more superior mildly FDG avid left lower lobe calcified granuloma demonstrates max SUV 4.1.   Few right-sided nodular opacities are only minimally FDG avid. For example: Posterolateral right lower lobe nodular opacity with max SUV 1.2. Right upper lobe calcified nodule with max SUV 1.0.     A few mildly FDG avid mediastinal and bilateral hilar lymph nodes are seen. For example: Right hilar node with max SUV of 3.5. Left hilar node with max SUV of 3.4. Subcarinal node with max SUV 2.8.   ABDOMEN AND PELVIS: No FDG avid  soft tissue lesion is present in the abdomen and pelvis. No evidence of FDG avid  lymphadenopathy. Physiologic radiotracer uptake is present in the liver and spleen with excretion into the bowel loops and the genitourinary tract.   MUSCULOSKELETAL: No focal FDG avid  lesion is seen in the axial or appendicular to suggest osseous metastasis. Hypermetabolic activity at the right aspect of the T8 vertebral body is likely degenerative in etiology given prominent osteophyte. A right hip arthroplasties present.       1. Hypermetabolic left lung base nodule suspicious for malignancy. Tissue sampling is recommended. 2. Additional bilateral pulmonary nodules, many of which demonstrate calcification and exhibit minimal to mild FDG avidity are likely benign. Continued  attention on follow-up chest CT is recommended. 3. Nonspecific mildly hypermetabolic mediastinal and bilateral hilar lymph nodes are favored to be reactive.     I personally reviewed the images/study and I agree with the resident Jermaine Muller's findings as stated. This study was interpreted at [facility], [place], [place].   Signed by: Froylan Oliver 10/30/2024 10:53 AM Dictation workstation:   [id number]     Assessment/Plan   Acute hypoxic respiratory failure  RSV, clinically improving  Abnormal CT-RSV pneumonitis, versus evolving pneumonia-negative MRSA PCR  Pulmonary nodules     Monitor off antibiotics   Oxygen as needed  Contact precautions  Follow-up mycoplasma serology  Supportive care  Monitor temperature and WBC  Discharge planning    Total time spent caring for the patient today was 25 minutes. This includes time spent before the visit reviewing the chart, time spent during the visit, and time spent after the visit on documentation     SEDA Pagan-CNP

## 2024-11-29 NOTE — NURSING NOTE
Pt being discharged home, working on arranging transportation at this time, requiring Home O2. IV and Tele discontinued, Respiratory therapist in with pt explaining home O2 use

## 2024-11-29 NOTE — PROGRESS NOTES
11/29/24 1425   Discharge Planning   Assistance Needed Patient is medically ready for discharge. RT to arrange any home O2. No other needs. DC Plan: Home.   Expected Discharge Disposition Home

## 2024-11-29 NOTE — CARE PLAN
The clinical goals for the shift include Pts SpO2 >92% on 2 L NC    Over the shift, the patient did make progress toward the following goals. Being discharged home, requires Home oxygen 2l NC, discharge reviewed with pt, no questions or concerns at this time, IV and Tele discontinued

## 2024-12-01 LAB — M PNEUMO IGM SER IA-ACNC: 0.09 U/L

## 2024-12-01 NOTE — DISCHARGE SUMMARY
Discharge Diagnosis  RSV infection    Discharge Meds     Medication List      START taking these medications     oxygen gas therapy; Commonly known as: O2; Inhale 1 each once every 24   hours.     CONTINUE taking these medications     albuterol 90 mcg/actuation inhaler; Commonly known as: Ventolin HFA;   Inhale 2 puffs every 4 hours if needed for wheezing or shortness of   breath.   ALPRAZolam 0.5 mg tablet; Commonly known as: Xanax; Take 1 tablet (0.5   mg) by mouth 3 times a day as needed for anxiety.   levothyroxine 25 mcg tablet; Commonly known as: Synthroid, Levoxyl; Take   1 tablet (25 mcg) by mouth once daily in the morning. Take before meals.   predniSONE 10 mg tablet; Commonly known as: Deltasone; Take 3 tablets   (30 mg) by mouth once daily for 3 days, THEN 2 tablets (20 mg) once daily   for 3 days, THEN 1 tablet (10 mg) once daily for 3 days.; Start taking on:   November 25, 2024   rOPINIRole 4 mg tablet; Commonly known as: Requip; Take 1 tablet (4 mg)   by mouth 2 times a day.   torsemide 20 mg tablet; Commonly known as: Demadex   zolpidem 10 mg tablet; Commonly known as: Ambien; Take 1 tablet (10 mg)   by mouth as needed at bedtime for sleep.     STOP taking these medications     azithromycin 500 mg tablet; Commonly known as: Zithromax   cefuroxime 500 mg tablet; Commonly known as: Ceftin     ASK your doctor about these medications     ondansetron ODT 4 mg disintegrating tablet; Commonly known as:   Zofran-ODT; Take 1 tablet (4 mg) by mouth every 8 hours if needed for   nausea or vomiting.; Ask about: Should I take this medication?       Test Results Pending At Discharge  Pending Labs       Order Current Status    Mycoplasma pneumoniae antibody, IgM In process          Hospital Course   Chantelle Rao is a 69 y.o. female with PMH of COPD, left lower lobe nodule found in the past month, GERD, iron deficiency anemia, insomnia, chronic back pain, anxiety and restless leg syndrome who presented to the ED  with worsening shortness of breath, wheezing and a barking cough. She was discharged this past Monday after admission for cough, fever and shortness of breath. She was given oral antibiotics and steroids to take at home after her symptoms improved. She woke up feeling worse and called her PCP, then went to ED. In the ED her workup showed +RSV. She was treated with IV levaquin, IV steroids. She was seen by Infectious disease and will be monitored off antibiotics. She was discharged home after improvement, but still requiring 2 liters of oxygen with activity and at night. She will continue oral steroids and follow up with her PCP and Pulmonology.    Problem List:  #RSV (acute bronchiolitis due to respiratory syncytial virus)  #Acute hypoxic respiratory failure   #Tobacco use disorder  #COPD exacerbation  -discharged home, worsened the next day, severe wheezing, sore throat, barking cough  -PFT: Spirometry shows severe obstruction with small airways bronchodilator response. Lung volumes show air trapping. Diffusing capacity is normal.  -CT PE: No gross central PE however limited assessment for peripheral PE due to technical factors.   Slight worsening of bilateral nodular/micronodular infiltrates since 3 days prior. Unchanged dominant irregular masslike opacity in the left lung base, reportedly hypermetabolic on prior PET scan and concerning for malignancy, and mild lymphadenopathy. Further evaluation/continued follow-up recommended.   -Planned to have biopsy with Dr. Larkin on  for lung nodule, cancelled due to illness   -WBC 11.8 > 9.7 > 17.0 (on steroids)  -VB.42/38/42/24.6  -continue duonebs  -continue IV solumedrol q8  -discontinue IV levaquin, day 1 > transitioned to IV vanc, zosyn per Dat (Day 1)  -Procal 0.03; discontinue abx per ID recs   -RT consult, bronchial hygiene  -ID consult, recs appreciated  -supportive care  ---monitor off antibiotics, continue oral steroids  ---Follow up as needed with  PCP     #HLD  #Angina  #Aortic Insufficiency, severe  #Hypothyroidism  #RLS (restless legs syndrome)  #Anxiety  ---Continue all chronic meds  ---Follow up as needed with PCP    Pertinent Physical Exam At Time of Discharge  Physical Exam  Vitals reviewed.   HENT:      Head: Normocephalic and atraumatic.      Right Ear: External ear normal.      Left Ear: External ear normal.      Nose: Nose normal.      Mouth/Throat:      Pharynx: Oropharynx is clear.   Eyes:      Conjunctiva/sclera: Conjunctivae normal.   Cardiovascular:      Rate and Rhythm: Normal rate and regular rhythm.      Pulses: Normal pulses.      Heart sounds: Normal heart sounds.   Pulmonary:      Comments: Mild wheeze, improved from yesterday  Abdominal:      General: Bowel sounds are normal.      Palpations: Abdomen is soft.   Musculoskeletal:         General: Normal range of motion.      Cervical back: Normal range of motion and neck supple.   Skin:     General: Skin is dry.   Neurological:      General: No focal deficit present.      Mental Status: She is alert and oriented to person, place, and time.   Psychiatric:         Mood and Affect: Mood normal.         Behavior: Behavior normal.     Stable for discharge.  Total cumulative time spent in preparation of this discharge including documentation review, coordination of care with the medical team including PT/SW/care coordinators and treating consultants, discussion with patient and pertinent family members and finalization of prescriptions, follow-up appointments, and this discharge summary was approximately 45 minutes.    Outpatient Follow-Up  Future Appointments   Date Time Provider Department Center   12/9/2024  1:45 PM Susan L Mayne, APRN-CNP [id number] [facility]   1/8/2025 11:20 AM Kavitha Medina MD [id number] [facility]       [facility]

## 2024-12-02 ENCOUNTER — PATIENT OUTREACH (OUTPATIENT)
Dept: PRIMARY CARE | Facility: CLINIC | Age: 69
End: 2024-12-02
Payer: MEDICARE

## 2024-12-02 NOTE — PROGRESS NOTES
Discharge Facility:  [facility]  Discharge Diagnosis: RSV infection  Admission Date  11/26/24  Discharge Date:  11/29/24    PCP Appointment Date: TBD - message sent to office  Specialist Appointment Date: TBD - pulm  Hospital Encounter and Summary Linked: Yes  See discharge assessment below for further details     Engagement  Call Start Time: 1442 (12/2/2024  2:56 PM)    Medications  Medications reviewed with patient/caregiver?: Yes (12/2/2024  2:56 PM)  Is the patient having any side effects they believe may be caused by any medication additions or changes?: No (12/2/2024  2:56 PM)  Does the patient have all medications ordered at discharge?: Yes (12/2/2024  2:56 PM)  Care Management Interventions: No intervention needed (12/2/2024  2:56 PM)  Prescription Comments: Start oxygen, Stop Zithromax and Ceftin (12/2/2024  2:56 PM)  Is the patient taking all medications as directed (includes completed medication regime)?: Yes (12/2/2024  2:56 PM)  Medication Comments: Patient denies questions or concerns regarding her medications. (12/2/2024  2:56 PM)    Appointments  Does the patient have a primary care provider?: Yes (12/2/2024  2:56 PM)  Care Management Interventions: Advised patient to make appointment (12/2/2024  2:56 PM)  Has the patient kept scheduled appointments due by today?: Not applicable (12/2/2024  2:56 PM)    Self Management  What is the home health agency?: n/a (12/2/2024  2:56 PM)  Has home health visited the patient within 72 hours of discharge?: Not applicable (12/2/2024  2:56 PM)  What Durable Medical Equipment (DME) was ordered?: oxygen (12/2/2024  2:56 PM)  Has all Durable Medical Equipment (DME) been delivered?: Yes (12/2/2024  2:56 PM)    Patient Teaching  Does the patient have access to their discharge instructions?: Yes (12/2/2024  2:56 PM)  Care Management Interventions: Reviewed instructions with patient (12/2/2024  2:56 PM)  What is the patient's perception of their health status  since discharge?: Improving (12/2/2024  2:56 PM)  Is the patient/caregiver able to teach back the hierarchy of who to call/visit for symptoms/problems? PCP, Specialist, Home Health nurse, Urgent Care, ED, 911: Yes (12/2/2024  2:56 PM)  Patient/Caregiver Education Comments: Oxygen safety education reviewed with patient. Encouraged use of incentive spirometer if she has it, she was unable to locate it during call, but thinks they did give her this prior to discharge. (12/2/2024  2:56 PM)    Wrap Up  Wrap Up Additional Comments: Successful transition of care outreach with patient. Patient reports doing better at home since discharge. New to oxygen, states is using this 24/7.  She is nervous about oxygen dropping, stating it was a very scare experience.  She does have a pulse oximeter so is able to monitor this at home.  Patient denies further discharge questions/concerns/needs at time of outreach call. Emphasized that follow up appts are needed after discharge with PCP and reviewed needed follow ups with any specialties to assess response to treatment from hospitalization. She was contacted today about rescheduling her lung biopsy and was told they are going to give her a few weeks to get better prior to doing the biopsy.  She is wondering if her PCP can do a virtual appt instead of in person due to her not feeling completely better yet. Message will be sent to office to call her to schedule.  Patient aware of my availability for non-emergent concerns. (12/2/2024  2:56 PM)

## 2024-12-04 ENCOUNTER — TELEPHONE (OUTPATIENT)
Dept: PRIMARY CARE | Facility: CLINIC | Age: 69
End: 2024-12-04
Payer: MEDICARE

## 2024-12-04 NOTE — TELEPHONE ENCOUNTER
Transition of Care    Inpatient facility: [facility]  Discharge diagnosis: RSV/COPD  Discharged to: HOME  Discharge date: 11/29/24  Initial Call date: 11/23/24  Spoke with patient/caregiver: PATIENT                                                                     Do you need assistance  visits prior to your PCP visit: No  Home health care ordered: No  Have you been contacted by home care and have a start of care date: No  Are you taking medications as prescribed at discharge: Yes    Referral to [facility] Pharmacist: No  Patient advised to bring all medications to PCP follow-up appointment.  Patient advised to follow discharge instructions until provider follow-up.  TCM visit date: 12/06/24  TCM provider visit with: YESENIA GREENE NP

## 2024-12-06 ENCOUNTER — OFFICE VISIT (OUTPATIENT)
Dept: PRIMARY CARE | Facility: CLINIC | Age: 69
End: 2024-12-06
Payer: MEDICARE

## 2024-12-06 VITALS
WEIGHT: 158.9 LBS | BODY MASS INDEX: 27.28 KG/M2 | DIASTOLIC BLOOD PRESSURE: 82 MMHG | OXYGEN SATURATION: 93 % | HEART RATE: 95 BPM | TEMPERATURE: 97.9 F | SYSTOLIC BLOOD PRESSURE: 124 MMHG

## 2024-12-06 DIAGNOSIS — R06.02 SOB (SHORTNESS OF BREATH) ON EXERTION: ICD-10-CM

## 2024-12-06 DIAGNOSIS — J44.9 CHRONIC OBSTRUCTIVE PULMONARY DISEASE, UNSPECIFIED COPD TYPE (MULTI): ICD-10-CM

## 2024-12-06 DIAGNOSIS — J20.5 RSV BRONCHITIS: Primary | ICD-10-CM

## 2024-12-06 PROCEDURE — 3074F SYST BP LT 130 MM HG: CPT

## 2024-12-06 PROCEDURE — 1111F DSCHRG MED/CURRENT MED MERGE: CPT

## 2024-12-06 PROCEDURE — 99496 TRANSJ CARE MGMT HIGH F2F 7D: CPT

## 2024-12-06 PROCEDURE — 1159F MED LIST DOCD IN RCRD: CPT

## 2024-12-06 PROCEDURE — 4004F PT TOBACCO SCREEN RCVD TLK: CPT

## 2024-12-06 PROCEDURE — 3079F DIAST BP 80-89 MM HG: CPT

## 2024-12-06 RX ORDER — LEVALBUTEROL TARTRATE 45 UG/1
1-2 AEROSOL, METERED ORAL EVERY 6 HOURS PRN
Qty: 15 G | Refills: 11 | Status: SHIPPED | OUTPATIENT
Start: 2024-12-06 | End: 2025-12-06

## 2024-12-06 RX ORDER — PREDNISONE 10 MG/1
10 TABLET ORAL 3 TIMES DAILY
Qty: 9 TABLET | Refills: 0 | Status: SHIPPED | OUTPATIENT
Start: 2024-12-06 | End: 2024-12-09

## 2024-12-06 NOTE — PROGRESS NOTES
"Patient: Chantelle Rao  : 1955  PCP: Antonio Arambula, APRN-CNP  MRN: [id number]  Program: No linked episodes     Chantelle Rao is a 69 y.o. female presenting today for follow-up after being discharged from the hospital 7 days ago. The main problem requiring admission was RSV infection. The discharge summary and/or Transitional Care Management documentation was reviewed. Medication reconciliation was performed as indicated via the \"[place] as Reviewed\" timestamp.     Chief Complaint   Patient presents with    Hospital Follow-up     SAMARA- Admitted to [place] - for RSV infection.     Vitals:    24 1048   BP: 124/82   Pulse: 95   Temp: 36.6 °C (97.9 °F)   SpO2: 93%         Chantelle Rao was contacted by Transitional Care Management services two days after her discharge. This encounter and supporting documentation was reviewed.    The complexity of medical decision making for this patient's transitional care is moderate.    Review of Systems    Physical Exam  Vitals and nursing note reviewed.   Constitutional:       General: She is not in acute distress.     Appearance: Normal appearance. She is not diaphoretic.   Pulmonary:      Effort: No respiratory distress.      Breath sounds: No wheezing or rhonchi.   Neurological:      Mental Status: She is alert.          Family History   Problem Relation Name Age of Onset    Breast cancer Mother      Breast cancer Sister      Cancer Other Family History     Breast cancer Mother's Sister         No data recorded    Assessment/Plan   Problem List Items Addressed This Visit       SOB (shortness of breath) on exertion    Relevant Medications    levalbuterol (Xopenex HFA) 45 mcg/actuation inhaler     Other Visit Diagnoses       RSV bronchitis    -  Primary    Relevant Medications    levalbuterol (Xopenex HFA) 45 mcg/actuation inhaler    predniSONE (Deltasone) 10 mg tablet    Chronic obstructive pulmonary disease, unspecified COPD type (Multi)                No " follow-ups on file.        11-Jan-2018 12:48

## 2024-12-06 NOTE — PATIENT INSTRUCTIONS
Continue nightly oxygen at 2L  until you see a pulmonologist  RSV vaccine in 3-4 weeks    Be sure to complete survey regarding your hospital stay.    See me in 2 weeks follow up breathing  Xopenex refill and prednisone burst    Thank you for coming in today, if any questions or concerns arise, please call my office.   Antonio Arambula, APRN-CNP

## 2024-12-09 ENCOUNTER — APPOINTMENT (OUTPATIENT)
Dept: DERMATOLOGY | Facility: CLINIC | Age: 69
End: 2024-12-09
Payer: MEDICARE

## 2024-12-12 DIAGNOSIS — G47.09 OTHER INSOMNIA: ICD-10-CM

## 2024-12-12 DIAGNOSIS — F41.9 ANXIETY: ICD-10-CM

## 2024-12-12 RX ORDER — ALPRAZOLAM 0.5 MG/1
0.5 TABLET ORAL 3 TIMES DAILY PRN
Qty: 90 TABLET | Refills: 0 | Status: SHIPPED | OUTPATIENT
Start: 2024-12-12 | End: 2025-01-11

## 2024-12-12 RX ORDER — ZOLPIDEM TARTRATE 10 MG/1
10 TABLET ORAL NIGHTLY PRN
Qty: 30 TABLET | Refills: 0 | Status: SHIPPED | OUTPATIENT
Start: 2024-12-12 | End: 2025-01-11

## 2024-12-13 RX ORDER — ZOLPIDEM TARTRATE 10 MG/1
10 TABLET ORAL NIGHTLY PRN
Qty: 30 TABLET | Refills: 0 | Status: SHIPPED | OUTPATIENT
Start: 2024-12-13 | End: 2025-01-12

## 2024-12-13 RX ORDER — ALPRAZOLAM 0.5 MG/1
0.5 TABLET ORAL 3 TIMES DAILY PRN
Qty: 90 TABLET | Refills: 0 | Status: SHIPPED | OUTPATIENT
Start: 2024-12-13 | End: 2025-01-12

## 2024-12-18 ENCOUNTER — TELEPHONE (OUTPATIENT)
Dept: CARDIAC SURGERY | Facility: CLINIC | Age: 69
End: 2024-12-18
Payer: MEDICARE

## 2024-12-18 NOTE — TELEPHONE ENCOUNTER
Pt called to ask about rescheduling her IP Lung Bx after healing from her RSV infection and admission to hospital. Notified Kevin LUX that she is req to get a new bx date.

## 2024-12-19 DIAGNOSIS — R91.1 LUNG NODULE: ICD-10-CM

## 2024-12-19 DIAGNOSIS — Z01.818 PRE-OP TESTING: ICD-10-CM

## 2024-12-20 ENCOUNTER — APPOINTMENT (OUTPATIENT)
Dept: PRIMARY CARE | Facility: CLINIC | Age: 69
End: 2024-12-20
Payer: MEDICARE

## 2024-12-20 VITALS
DIASTOLIC BLOOD PRESSURE: 70 MMHG | BODY MASS INDEX: 27.74 KG/M2 | TEMPERATURE: 96.8 F | WEIGHT: 161.6 LBS | HEART RATE: 99 BPM | OXYGEN SATURATION: 96 % | SYSTOLIC BLOOD PRESSURE: 120 MMHG

## 2024-12-20 DIAGNOSIS — I70.0 ATHEROSCLEROSIS OF AORTA (CMS-HCC): ICD-10-CM

## 2024-12-20 DIAGNOSIS — I10 PRIMARY HYPERTENSION: Primary | ICD-10-CM

## 2024-12-20 DIAGNOSIS — R73.03 PREDIABETES: ICD-10-CM

## 2024-12-20 LAB
POC ALBUMIN /CREATININE RATIO MANUALLY ENTERED: ABNORMAL UG/MG CREAT
POC APPEARANCE, URINE: CLEAR
POC BILIRUBIN, URINE: NEGATIVE
POC BLOOD, URINE: NEGATIVE
POC COLOR, URINE: NORMAL
POC GLUCOSE, URINE: NEGATIVE MG/DL
POC HEMOGLOBIN A1C: 5.7 % (ref 4.2–6.5)
POC KETONES, URINE: NEGATIVE MG/DL
POC LEUKOCYTES, URINE: NEGATIVE
POC NITRITE,URINE: NEGATIVE
POC PH, URINE: 5.5 PH
POC PROTEIN, URINE: NEGATIVE MG/DL
POC SPECIFIC GRAVITY, URINE: 1.01
POC URINE ALBUMIN: 10 MG/L
POC URINE CREATININE: 10 MG/DL
POC UROBILINOGEN, URINE: 0.2 EU/DL

## 2024-12-20 PROCEDURE — 1111F DSCHRG MED/CURRENT MED MERGE: CPT

## 2024-12-20 PROCEDURE — 80048 BASIC METABOLIC PNL TOTAL CA: CPT

## 2024-12-20 PROCEDURE — 1159F MED LIST DOCD IN RCRD: CPT

## 2024-12-20 PROCEDURE — 3074F SYST BP LT 130 MM HG: CPT

## 2024-12-20 PROCEDURE — 4004F PT TOBACCO SCREEN RCVD TLK: CPT

## 2024-12-20 PROCEDURE — 3078F DIAST BP <80 MM HG: CPT

## 2024-12-20 PROCEDURE — 99214 OFFICE O/P EST MOD 30 MIN: CPT

## 2024-12-20 PROCEDURE — 1123F ACP DISCUSS/DSCN MKR DOCD: CPT

## 2024-12-20 PROCEDURE — 82044 UR ALBUMIN SEMIQUANTITATIVE: CPT

## 2024-12-20 PROCEDURE — G2211 COMPLEX E/M VISIT ADD ON: HCPCS

## 2024-12-20 PROCEDURE — 83036 HEMOGLOBIN GLYCOSYLATED A1C: CPT

## 2024-12-20 PROCEDURE — 81003 URINALYSIS AUTO W/O SCOPE: CPT

## 2024-12-20 PROCEDURE — 1157F ADVNC CARE PLAN IN RCRD: CPT

## 2024-12-20 NOTE — PATIENT INSTRUCTIONS
Restart torsemide  Check urines today and check BMP    Thank you for coming in today, if any questions or concerns arise, please call my office.   SEDA Crane-CNP        Ways to Help Prevent Falls at Home    Quick Tips   ? Ask for help if you need it. Most people want to help!   ? Get up slowly after sitting or laying down   ? Wear a medical alert device or keep cell phone in your pocket   ? Use night lights, especially areas near a bathroom   ? Keep the items you use often within reach on a small stool or end table   ? Use an assistive device such as walker or cane, as directed by provider/physical therapy   ? Use a non-slip mat and grab bars in your bathroom. Look for home health sections for best options     Other Areas to Focus On   ? Exercise and nutrition: Regular exercise or taking a falls prevention class are great ways improve strength and balance. Don’t forget to stay hydrated and bring a snack!   ? Medicine side effects: Some medicines can make you sleepy or dizzy, which could cause a fall. Ask your healthcare provider about the side effects your medicines could cause. Be sure to let them know if you take any vitamins or supplements as well.   ? Tripping hazards: Remove items you could trip on, such as loose mats, rugs, cords, and clutter. Wear closed toe shoes with rubber soles.   ? Health and wellness: Get regular checkups with your healthcare provider, plus routine vision and hearing screenings. Talk with your healthcare provider about:   o Your medicines and the possible side effects - bring them in a bag if that is easier!   o Problems with balance or feeling dizzy   o Ways to promote bone health, such as Vitamin D and calcium supplements   o Questions or concerns about falling     *Ask your healthcare team if you have questions     ©[facility], 2022

## 2024-12-20 NOTE — PROGRESS NOTES
Subjective   Patient ID: Chantelle Rao is a 69 y.o. female who presents for Follow-up (2 week), Breathing Problem, Leg Swelling (Reports swelling in bilateral legs states she is retaining a lot of water despite taking her water pill and continuing to drink a lot of water. She was told in the hospital she has kidney problems and would like to know if this is true), Pain (Right side pain that comes and goes), and Poor Appetite.  Breathing follow up  Improving, off O2, pulse ox today wnl  Right lower lobe diminished breath sounds  Imaging negative previously    Leg swelling  Ongoing, unable to determine if she is taking Torsemide  Restart this    JAZMIN  Check BMP UA Albumin today    Hyperglycemia  Polydipsia   A1c check.        Vitals:    12/20/24 1429   BP: 120/70   Pulse: 99   Temp: 36 °C (96.8 °F)   SpO2: 96%       Review of Systems    Objective   Physical Exam    Assessment/Plan   Problem List Items Addressed This Visit    None           Thank you for coming in today, please call my office if you have any concerns or questions.     Antonio STACK, CNP  Patient was identified as a fall risk. Risk prevention instructions provided.

## 2024-12-21 LAB
ANION GAP SERPL CALC-SCNC: 14 MMOL/L (ref 10–20)
BUN SERPL-MCNC: 18 MG/DL (ref 6–23)
CALCIUM SERPL-MCNC: 9.6 MG/DL (ref 8.6–10.6)
CHLORIDE SERPL-SCNC: 96 MMOL/L (ref 98–107)
CO2 SERPL-SCNC: 33 MMOL/L (ref 21–32)
CREAT SERPL-MCNC: 0.8 MG/DL (ref 0.5–1.05)
EGFRCR SERPLBLD CKD-EPI 2021: 80 ML/MIN/1.73M*2
GLUCOSE SERPL-MCNC: 91 MG/DL (ref 74–99)
POTASSIUM SERPL-SCNC: 4 MMOL/L (ref 3.5–5.3)
SODIUM SERPL-SCNC: 139 MMOL/L (ref 136–145)

## 2024-12-23 ENCOUNTER — TELEPHONE (OUTPATIENT)
Dept: PRIMARY CARE | Facility: CLINIC | Age: 69
End: 2024-12-23
Payer: MEDICARE

## 2024-12-23 DIAGNOSIS — I10 PRIMARY HYPERTENSION: ICD-10-CM

## 2024-12-23 RX ORDER — TORSEMIDE 20 MG/1
40 TABLET ORAL 2 TIMES DAILY
Qty: 360 TABLET | Refills: 0 | Status: SHIPPED | OUTPATIENT
Start: 2024-12-23 | End: 2025-03-23

## 2024-12-23 RX ORDER — TORSEMIDE 20 MG/1
40 TABLET ORAL 2 TIMES DAILY
Qty: 360 TABLET | Refills: 0 | Status: CANCELLED | OUTPATIENT
Start: 2024-12-23 | End: 2025-03-23

## 2024-12-23 NOTE — RESULT ENCOUNTER NOTE
Results are normal, please notify the patient. Renal function has returned to her baseline. GFR up to 80.

## 2024-12-23 NOTE — TELEPHONE ENCOUNTER
[facility] called stating the torsemide 40 mg is not covered through insurance due to Brand Only for this milligram. Pharmacist is suggesting changing it to the 20 mg and having patient take two tablets BID this will allow them to dispense a generic which will most likely be covered by insurance.     Will need new script sent

## 2024-12-30 ENCOUNTER — TELEPHONE (OUTPATIENT)
Dept: PRIMARY CARE | Facility: CLINIC | Age: 69
End: 2024-12-30
Payer: MEDICARE

## 2024-12-30 NOTE — TELEPHONE ENCOUNTER
Patient called requesting a new script for sumatriptan for her headaches. She states she was on this last summer.

## 2024-12-31 DIAGNOSIS — G43.E09 CHRONIC MIGRAINE WITH AURA WITHOUT STATUS MIGRAINOSUS, NOT INTRACTABLE: Primary | ICD-10-CM

## 2024-12-31 RX ORDER — SUMATRIPTAN SUCCINATE 50 MG/1
50 TABLET ORAL ONCE AS NEEDED
Qty: 27 TABLET | Refills: 3 | Status: SHIPPED | OUTPATIENT
Start: 2024-12-31 | End: 2025-12-31

## 2025-01-02 ENCOUNTER — LAB (OUTPATIENT)
Dept: LAB | Facility: LAB | Age: 70
End: 2025-01-02
Payer: MEDICARE

## 2025-01-02 ENCOUNTER — APPOINTMENT (OUTPATIENT)
Dept: PULMONOLOGY | Facility: CLINIC | Age: 70
End: 2025-01-02
Payer: MEDICARE

## 2025-01-02 VITALS — BODY MASS INDEX: 27.49 KG/M2 | WEIGHT: 161 LBS | HEIGHT: 64 IN

## 2025-01-02 DIAGNOSIS — R91.1 NODULE OF LOWER LOBE OF LEFT LUNG: ICD-10-CM

## 2025-01-02 DIAGNOSIS — R91.1 LUNG NODULE: ICD-10-CM

## 2025-01-02 DIAGNOSIS — Z01.811 PREOP PULMONARY/RESPIRATORY EXAM: Primary | ICD-10-CM

## 2025-01-02 DIAGNOSIS — Z01.818 PRE-OP TESTING: ICD-10-CM

## 2025-01-02 LAB
ANION GAP SERPL CALC-SCNC: 12 MMOL/L (ref 10–20)
BUN SERPL-MCNC: 20 MG/DL (ref 6–23)
CALCIUM SERPL-MCNC: 9.4 MG/DL (ref 8.6–10.3)
CHLORIDE SERPL-SCNC: 93 MMOL/L (ref 98–107)
CO2 SERPL-SCNC: 33 MMOL/L (ref 21–32)
CREAT SERPL-MCNC: 0.86 MG/DL (ref 0.5–1.05)
EGFRCR SERPLBLD CKD-EPI 2021: 73 ML/MIN/1.73M*2
ERYTHROCYTE [DISTWIDTH] IN BLOOD BY AUTOMATED COUNT: 13.6 % (ref 11.5–14.5)
GLUCOSE SERPL-MCNC: 100 MG/DL (ref 74–99)
HCT VFR BLD AUTO: 41.5 % (ref 36–46)
HGB BLD-MCNC: 13.5 G/DL (ref 12–16)
MCH RBC QN AUTO: 28.5 PG (ref 26–34)
MCHC RBC AUTO-ENTMCNC: 32.5 G/DL (ref 32–36)
MCV RBC AUTO: 88 FL (ref 80–100)
NRBC BLD-RTO: 0 /100 WBCS (ref 0–0)
PLATELET # BLD AUTO: 264 X10*3/UL (ref 150–450)
POTASSIUM SERPL-SCNC: 3.4 MMOL/L (ref 3.5–5.3)
RBC # BLD AUTO: 4.74 X10*6/UL (ref 4–5.2)
SODIUM SERPL-SCNC: 135 MMOL/L (ref 136–145)
WBC # BLD AUTO: 9 X10*3/UL (ref 4.4–11.3)

## 2025-01-02 PROCEDURE — 80048 BASIC METABOLIC PNL TOTAL CA: CPT

## 2025-01-02 PROCEDURE — 1157F ADVNC CARE PLAN IN RCRD: CPT | Performed by: INTERNAL MEDICINE

## 2025-01-02 PROCEDURE — 1036F TOBACCO NON-USER: CPT | Performed by: INTERNAL MEDICINE

## 2025-01-02 PROCEDURE — 1123F ACP DISCUSS/DSCN MKR DOCD: CPT | Performed by: INTERNAL MEDICINE

## 2025-01-02 PROCEDURE — 3008F BODY MASS INDEX DOCD: CPT | Performed by: INTERNAL MEDICINE

## 2025-01-02 PROCEDURE — 85027 COMPLETE CBC AUTOMATED: CPT

## 2025-01-02 PROCEDURE — 99213 OFFICE O/P EST LOW 20 MIN: CPT | Performed by: INTERNAL MEDICINE

## 2025-01-02 PROCEDURE — 1159F MED LIST DOCD IN RCRD: CPT | Performed by: INTERNAL MEDICINE

## 2025-01-02 NOTE — PROGRESS NOTES
Subjective   Patient ID: Chantelle Rao is a 69 y.o. female who presents for Pre Bronchoscopy.  HPI  Patient was contacted by telephone this afternoon at her home.  She is scheduled for a biopsy of a left lower lobe irregular nodule to be done at [facility] at [address] [place] [address].  She will have a CT scan done prior to the procedure at 9:30 AM at the [facility].  I reviewed her CT scan of the chest without contrast on from 11/26/2024 and also her PET scan on 10/30/2024 which shows there is uptake in that left lower lobe nodule.  Her smoking history is quarter of a pack per day from 20 23-11 1 24.    She denies use of alcohol.  She reports having some discomfort on the right side of her chest but none on the left and I told her I could not give her an explanation for that.  She reports that she has been feeling better recently and is prepared to undergo the bronchoscopy.  I answered all the patient's questions to her satisfaction.  Review of Systems  Not done  Objective   Physical Exam  Not done.  Assessment/Plan        1.  Preop pulmonary/respiratory exam.  2.  Left lower lobe lung nodule.        This note was transcribed using the Dragon Dictation system.  There may be grammatical, punctuation, or verbiage errors that occur with voice recognition programs.  Froylan Solis DO 01/02/25 1:47 PM

## 2025-01-02 NOTE — H&P (VIEW-ONLY)
Subjective   Patient ID: Chantelle Rao is a 69 y.o. female who presents for Pre Bronchoscopy.  HPI  Patient was contacted by telephone this afternoon at her home.  She is scheduled for a biopsy of a left lower lobe irregular nodule to be done at [facility] at [address] [place] [address].  She will have a CT scan done prior to the procedure at 9:30 AM at the [facility].  I reviewed her CT scan of the chest without contrast on from 11/26/2024 and also her PET scan on 10/30/2024 which shows there is uptake in that left lower lobe nodule.  Her smoking history is quarter of a pack per day from 20 23-11 1 24.    She denies use of alcohol.  She reports having some discomfort on the right side of her chest but none on the left and I told her I could not give her an explanation for that.  She reports that she has been feeling better recently and is prepared to undergo the bronchoscopy.  I answered all the patient's questions to her satisfaction.  Review of Systems  Not done  Objective   Physical Exam  Not done.  Assessment/Plan        1.  Preop pulmonary/respiratory exam.  2.  Left lower lobe lung nodule.        This note was transcribed using the Dragon Dictation system.  There may be grammatical, punctuation, or verbiage errors that occur with voice recognition programs.  Froylan Solis DO 01/02/25 1:47 PM

## 2025-01-06 ENCOUNTER — HOSPITAL ENCOUNTER (OUTPATIENT)
Dept: GASTROENTEROLOGY | Facility: HOSPITAL | Age: 70
Discharge: HOME | End: 2025-01-06
Payer: MEDICARE

## 2025-01-06 ENCOUNTER — ANESTHESIA EVENT (OUTPATIENT)
Dept: GASTROENTEROLOGY | Facility: HOSPITAL | Age: 70
End: 2025-01-06
Payer: MEDICARE

## 2025-01-06 ENCOUNTER — ANESTHESIA (OUTPATIENT)
Dept: GASTROENTEROLOGY | Facility: HOSPITAL | Age: 70
End: 2025-01-06
Payer: MEDICARE

## 2025-01-06 ENCOUNTER — HOSPITAL ENCOUNTER (OUTPATIENT)
Dept: RADIOLOGY | Facility: HOSPITAL | Age: 70
Discharge: HOME | End: 2025-01-06
Payer: MEDICARE

## 2025-01-06 VITALS
HEART RATE: 76 BPM | DIASTOLIC BLOOD PRESSURE: 62 MMHG | RESPIRATION RATE: 18 BRPM | WEIGHT: 150 LBS | OXYGEN SATURATION: 94 % | TEMPERATURE: 97.7 F | BODY MASS INDEX: 25.61 KG/M2 | HEIGHT: 64 IN | SYSTOLIC BLOOD PRESSURE: 102 MMHG

## 2025-01-06 DIAGNOSIS — R91.8 LUNG NODULES: ICD-10-CM

## 2025-01-06 PROBLEM — I25.10 CAD (CORONARY ARTERY DISEASE): Status: ACTIVE | Noted: 2025-01-06

## 2025-01-06 PROBLEM — I38 VALVULAR HEART DISEASE: Status: ACTIVE | Noted: 2025-01-06

## 2025-01-06 PROBLEM — R94.31 ABNORMAL EKG: Status: ACTIVE | Noted: 2025-01-06

## 2025-01-06 PROCEDURE — 7100000010 HC PHASE TWO TIME - EACH INCREMENTAL 1 MINUTE

## 2025-01-06 PROCEDURE — 31627 NAVIGATIONAL BRONCHOSCOPY: CPT | Performed by: STUDENT IN AN ORGANIZED HEALTH CARE EDUCATION/TRAINING PROGRAM

## 2025-01-06 PROCEDURE — 3700000001 HC GENERAL ANESTHESIA TIME - INITIAL BASE CHARGE

## 2025-01-06 PROCEDURE — 31654 BRONCH EBUS IVNTJ PERPH LES: CPT | Performed by: STUDENT IN AN ORGANIZED HEALTH CARE EDUCATION/TRAINING PROGRAM

## 2025-01-06 PROCEDURE — 71250 CT THORAX DX C-: CPT

## 2025-01-06 PROCEDURE — 71250 CT THORAX DX C-: CPT | Performed by: RADIOLOGY

## 2025-01-06 PROCEDURE — 2720000007 HC OR 272 NO HCPCS

## 2025-01-06 PROCEDURE — 31653 BRONCH EBUS SAMPLNG 3/> NODE: CPT | Performed by: STUDENT IN AN ORGANIZED HEALTH CARE EDUCATION/TRAINING PROGRAM

## 2025-01-06 PROCEDURE — 3700000002 HC GENERAL ANESTHESIA TIME - EACH INCREMENTAL 1 MINUTE

## 2025-01-06 PROCEDURE — 7100000009 HC PHASE TWO TIME - INITIAL BASE CHARGE

## 2025-01-06 PROCEDURE — 7100000002 HC RECOVERY ROOM TIME - EACH INCREMENTAL 1 MINUTE

## 2025-01-06 PROCEDURE — 88112 CYTOPATH CELL ENHANCE TECH: CPT | Mod: TC,MCY | Performed by: STUDENT IN AN ORGANIZED HEALTH CARE EDUCATION/TRAINING PROGRAM

## 2025-01-06 PROCEDURE — 31624 DX BRONCHOSCOPE/LAVAGE: CPT | Performed by: STUDENT IN AN ORGANIZED HEALTH CARE EDUCATION/TRAINING PROGRAM

## 2025-01-06 PROCEDURE — 2500000002 HC RX 250 W HCPCS SELF ADMINISTERED DRUGS (ALT 637 FOR MEDICARE OP, ALT 636 FOR OP/ED): Performed by: ANESTHESIOLOGY

## 2025-01-06 PROCEDURE — 31622 DX BRONCHOSCOPE/WASH: CPT | Performed by: STUDENT IN AN ORGANIZED HEALTH CARE EDUCATION/TRAINING PROGRAM

## 2025-01-06 PROCEDURE — C1601 HC OR 272 NO HCPCS: HCPCS

## 2025-01-06 PROCEDURE — 2500000004 HC RX 250 GENERAL PHARMACY W/ HCPCS (ALT 636 FOR OP/ED)

## 2025-01-06 PROCEDURE — 87015 SPECIMEN INFECT AGNT CONCNTJ: CPT | Performed by: STUDENT IN AN ORGANIZED HEALTH CARE EDUCATION/TRAINING PROGRAM

## 2025-01-06 PROCEDURE — 87070 CULTURE OTHR SPECIMN AEROBIC: CPT | Performed by: STUDENT IN AN ORGANIZED HEALTH CARE EDUCATION/TRAINING PROGRAM

## 2025-01-06 PROCEDURE — 7100000001 HC RECOVERY ROOM TIME - INITIAL BASE CHARGE

## 2025-01-06 PROCEDURE — 87102 FUNGUS ISOLATION CULTURE: CPT | Performed by: STUDENT IN AN ORGANIZED HEALTH CARE EDUCATION/TRAINING PROGRAM

## 2025-01-06 RX ORDER — HYDROMORPHONE HYDROCHLORIDE 1 MG/ML
0.4 INJECTION, SOLUTION INTRAMUSCULAR; INTRAVENOUS; SUBCUTANEOUS EVERY 5 MIN PRN
Status: DISCONTINUED | OUTPATIENT
Start: 2025-01-06 | End: 2025-01-07 | Stop reason: HOSPADM

## 2025-01-06 RX ORDER — HYDROMORPHONE HYDROCHLORIDE 1 MG/ML
0.2 INJECTION, SOLUTION INTRAMUSCULAR; INTRAVENOUS; SUBCUTANEOUS EVERY 5 MIN PRN
Status: DISCONTINUED | OUTPATIENT
Start: 2025-01-06 | End: 2025-01-07 | Stop reason: HOSPADM

## 2025-01-06 RX ORDER — LIDOCAINE HYDROCHLORIDE 10 MG/ML
0.1 INJECTION, SOLUTION EPIDURAL; INFILTRATION; INTRACAUDAL; PERINEURAL ONCE
Status: DISCONTINUED | OUTPATIENT
Start: 2025-01-06 | End: 2025-01-07 | Stop reason: HOSPADM

## 2025-01-06 RX ORDER — SODIUM CHLORIDE, SODIUM LACTATE, POTASSIUM CHLORIDE, CALCIUM CHLORIDE 600; 310; 30; 20 MG/100ML; MG/100ML; MG/100ML; MG/100ML
50 INJECTION, SOLUTION INTRAVENOUS CONTINUOUS
Status: DISCONTINUED | OUTPATIENT
Start: 2025-01-06 | End: 2025-01-07 | Stop reason: HOSPADM

## 2025-01-06 RX ORDER — ALBUTEROL SULFATE 0.83 MG/ML
2.5 SOLUTION RESPIRATORY (INHALATION) ONCE AS NEEDED
Status: COMPLETED | OUTPATIENT
Start: 2025-01-06 | End: 2025-01-06

## 2025-01-06 RX ORDER — PHENYLEPHRINE HYDROCHLORIDE 10 MG/ML
INJECTION INTRAVENOUS AS NEEDED
Status: DISCONTINUED | OUTPATIENT
Start: 2025-01-06 | End: 2025-01-06

## 2025-01-06 RX ORDER — ONDANSETRON HYDROCHLORIDE 2 MG/ML
4 INJECTION, SOLUTION INTRAVENOUS ONCE AS NEEDED
Status: DISCONTINUED | OUTPATIENT
Start: 2025-01-06 | End: 2025-01-07 | Stop reason: HOSPADM

## 2025-01-06 RX ORDER — ALBUTEROL SULFATE 0.83 MG/ML
2.5 SOLUTION RESPIRATORY (INHALATION) ONCE AS NEEDED
Status: DISCONTINUED | OUTPATIENT
Start: 2025-01-06 | End: 2025-01-07 | Stop reason: HOSPADM

## 2025-01-06 RX ORDER — ROCURONIUM BROMIDE 10 MG/ML
INJECTION, SOLUTION INTRAVENOUS AS NEEDED
Status: DISCONTINUED | OUTPATIENT
Start: 2025-01-06 | End: 2025-01-06

## 2025-01-06 RX ORDER — MIDAZOLAM HYDROCHLORIDE 1 MG/ML
INJECTION INTRAMUSCULAR; INTRAVENOUS AS NEEDED
Status: DISCONTINUED | OUTPATIENT
Start: 2025-01-06 | End: 2025-01-06

## 2025-01-06 RX ORDER — ONDANSETRON HYDROCHLORIDE 2 MG/ML
INJECTION, SOLUTION INTRAVENOUS AS NEEDED
Status: DISCONTINUED | OUTPATIENT
Start: 2025-01-06 | End: 2025-01-06

## 2025-01-06 RX ORDER — FENTANYL CITRATE 50 UG/ML
INJECTION, SOLUTION INTRAMUSCULAR; INTRAVENOUS AS NEEDED
Status: DISCONTINUED | OUTPATIENT
Start: 2025-01-06 | End: 2025-01-06

## 2025-01-06 RX ORDER — LIDOCAINE HCL/PF 100 MG/5ML
SYRINGE (ML) INTRAVENOUS AS NEEDED
Status: DISCONTINUED | OUTPATIENT
Start: 2025-01-06 | End: 2025-01-06

## 2025-01-06 RX ORDER — PROPOFOL 10 MG/ML
INJECTION, EMULSION INTRAVENOUS AS NEEDED
Status: DISCONTINUED | OUTPATIENT
Start: 2025-01-06 | End: 2025-01-06

## 2025-01-06 RX ORDER — ACETAMINOPHEN 325 MG/1
650 TABLET ORAL EVERY 4 HOURS PRN
Status: DISCONTINUED | OUTPATIENT
Start: 2025-01-06 | End: 2025-01-07 | Stop reason: HOSPADM

## 2025-01-06 RX ORDER — PHENYLEPHRINE 10 MG/250 ML(40 MCG/ML)IN 0.9 % SOD.CHLORIDE INTRAVENOUS
CONTINUOUS PRN
Status: DISCONTINUED | OUTPATIENT
Start: 2025-01-06 | End: 2025-01-06

## 2025-01-06 RX ADMIN — LIDOCAINE HYDROCHLORIDE 100 MG: 20 INJECTION INTRAVENOUS at 12:05

## 2025-01-06 RX ADMIN — PROPOFOL 40 MG: 10 INJECTION, EMULSION INTRAVENOUS at 13:18

## 2025-01-06 RX ADMIN — FENTANYL CITRATE 100 MCG: 50 INJECTION, SOLUTION INTRAMUSCULAR; INTRAVENOUS at 12:05

## 2025-01-06 RX ADMIN — SUGAMMADEX 200 MG: 100 INJECTION, SOLUTION INTRAVENOUS at 13:22

## 2025-01-06 RX ADMIN — PROPOFOL 125 MCG/KG/MIN: 10 INJECTION, EMULSION INTRAVENOUS at 12:07

## 2025-01-06 RX ADMIN — MIDAZOLAM HYDROCHLORIDE 2 MG: 1 INJECTION, SOLUTION INTRAMUSCULAR; INTRAVENOUS at 11:57

## 2025-01-06 RX ADMIN — PHENYLEPHRINE HYDROCHLORIDE 40 MCG: 10 INJECTION INTRAVENOUS at 12:14

## 2025-01-06 RX ADMIN — PHENYLEPHRINE-NACL IV SOLUTION 10 MG/250ML-0.9% 0.4 MCG/KG/MIN: 10-0.9/25 SOLUTION at 12:26

## 2025-01-06 RX ADMIN — ALBUTEROL SULFATE 2.5 MG: 2.5 SOLUTION RESPIRATORY (INHALATION) at 11:49

## 2025-01-06 RX ADMIN — ROCURONIUM 50 MG: 50 INJECTION, SOLUTION INTRAVENOUS at 12:05

## 2025-01-06 RX ADMIN — DEXAMETHASONE SODIUM PHOSPHATE 4 MG: 4 INJECTION INTRA-ARTICULAR; INTRALESIONAL; INTRAMUSCULAR; INTRAVENOUS; SOFT TISSUE at 12:12

## 2025-01-06 RX ADMIN — PHENYLEPHRINE HYDROCHLORIDE 80 MCG: 10 INJECTION INTRAVENOUS at 12:07

## 2025-01-06 RX ADMIN — ROCURONIUM 10 MG: 50 INJECTION, SOLUTION INTRAVENOUS at 12:34

## 2025-01-06 RX ADMIN — PROPOFOL 20 MG: 10 INJECTION, EMULSION INTRAVENOUS at 13:13

## 2025-01-06 RX ADMIN — PROPOFOL 160 MG: 10 INJECTION, EMULSION INTRAVENOUS at 12:05

## 2025-01-06 RX ADMIN — PROPOFOL 50 MG: 10 INJECTION, EMULSION INTRAVENOUS at 12:37

## 2025-01-06 RX ADMIN — PHENYLEPHRINE HYDROCHLORIDE 120 MCG: 10 INJECTION INTRAVENOUS at 12:23

## 2025-01-06 RX ADMIN — SODIUM CHLORIDE, SODIUM LACTATE, POTASSIUM CHLORIDE, AND CALCIUM CHLORIDE: 600; 310; 30; 20 INJECTION, SOLUTION INTRAVENOUS at 11:59

## 2025-01-06 RX ADMIN — ONDANSETRON 4 MG: 2 INJECTION INTRAMUSCULAR; INTRAVENOUS at 13:14

## 2025-01-06 ASSESSMENT — PAIN SCALES - GENERAL
PAIN_LEVEL: 2
PAINLEVEL_OUTOF10: 0 - NO PAIN
PAINLEVEL_OUTOF10: 0 - NO PAIN
PAINLEVEL_OUTOF10: 3
PAINLEVEL_OUTOF10: 0 - NO PAIN

## 2025-01-06 ASSESSMENT — PAIN - FUNCTIONAL ASSESSMENT
PAIN_FUNCTIONAL_ASSESSMENT: 0-10
PAIN_FUNCTIONAL_ASSESSMENT: 0-10

## 2025-01-06 NOTE — ANESTHESIA POSTPROCEDURE EVALUATION
Patient: Chantelle Rao    Procedure Summary       Date: 01/06/25 Room / Location: [facility], [place]    Anesthesia Start: 1200 Anesthesia Stop:     Procedure: BRONCHOSCOPY Diagnosis: Lung nodules    Scheduled Providers: Monique Zamora MD; Kathleen Portillo MD Responsible Provider: Kathleen Portillo MD    Anesthesia Type: general ASA Status: 3            Anesthesia Type: general        Anesthesia Post Evaluation    Patient location during evaluation: bedside  Patient participation: complete - patient participated  Level of consciousness: awake  Pain score: 2  Pain management: adequate  Airway patency: patent  Cardiovascular status: acceptable  Respiratory status: acceptable  Hydration status: acceptable  Postoperative Nausea and Vomiting: none      There were no known notable events for this encounter.

## 2025-01-06 NOTE — DISCHARGE INSTRUCTIONS
[facility] Interventional Pulmonology    The anesthetics, sedatives or narcotics which were given to you today will be acting in your body for the next 24 hours, so you might feel a little sleepy or groggy. This feeling should slowly wear off.   Carefully read and follow the instructions below:   You received sedation today.   Do not drive or operate machinery or power tools of any kind.   No alcoholic beverages today, not even beer or wine.   No over the counter medications that contain alcohol or may cause drowsiness.   Do not make important decisions or sign legal documents.     Do not use Aspirin containing products or non-steroidal medications for the next 24 hours.  (Examples of these types of medications include: Advil, Aleve, Ecotrin, Ibuprofen, Motrin or Naprosyn.  This list is not all-inclusive.  Check with your physician or pharmacist before resuming these medications.)  Tylenol, cough medicine, cough drops or throat lozenges may be used when you are allowed to resume eating and drinking.     Call your physician if any of these symptoms occur:   High fever over 101 degrees or chills (a low grade fever is common for 24 hours)   Rash or hives   Persistent nausea or vomiting   Inability to urinate within 8 hours after the procedure  Go directly to the emergency room if you notice any of the following:   Shortness of breath   Chest pain  Coughing up large amounts of bright red blood greater than a teaspoonful of blood clots (about a teaspoonful for the next 24-48 hours is normal, especially if you had a biopsy)  Resume all normal medications unless directed otherwise by your doctor.       Follow up with your referring physician as previously scheduled.    If you experience any problems or have any questions following discharge, please call:   Before 5 pm: [phone]   After 5pm and on weekends: [phone] / [phone] and ask for the Pulmonary Fellow on-call (Pager Number: [phone])

## 2025-01-06 NOTE — ANESTHESIA PREPROCEDURE EVALUATION
Patient: Chantelle Rao    Procedure Information       Date/Time: 01/06/25 1130    Scheduled providers: Monique Zamora MD; Kathleen Portillo MD    Procedure: BRONCHOSCOPY    Location: [facility]            Relevant Problems   Anesthesia (within normal limits)      Cardiac   (+) Abnormal EKG   (+) CAD (coronary artery disease)   (+) Essential familial hypercholesterolemia   (+) HTN (hypertension)   (+) Valvular heart disease      Pulmonary  Admission for RSV end of November requiring IV antibiotics, steroids and home O2 on discharge. Doing well but still requiring occasional home O2 (night).  Did notlike how steroids made her feel and says she'll never take them again. Denies cough, fevers, chills.  Uses inhaler as needed last needed about 3-4 days ago. Will order neb treatment for prophylaxis.   (+) Lung nodules   (+) SOB (shortness of breath) on exertion   (+) Simple chronic bronchitis (Multi)      Neuro   (+) Anxiety   (+) Chronic lumbar radiculopathy      GI   (+) Esophageal reflux   (+) Oropharyngeal dysphagia      Endocrine   (+) Class 1 obesity due to excess calories with serious comorbidity and body mass index (BMI) of 30.0 to 30.9 in adult   (+) Enlarged thyroid   (+) Hypothyroidism   (+) Obesity      Musculoskeletal   (+) Chronic pain syndrome   (+) Disc degeneration, lumbar   (+) Dupuytren's contracture   (+) Lumbar stenosis with neurogenic claudication      ID   (+) RSV (acute bronchiolitis due to respiratory syncytial virus)   (+) RSV infection      Skin   (+) Rash of face      Nervous   (+) RLS (restless legs syndrome)      Circulatory   (+) Atherosclerosis of aorta (CMS-HCC)      Cardiac and Vasculature   (+) Dyslipidemia      Musculoskeletal and Injuries   (+) Postlaminectomy syndrome       Clinical information reviewed:   Tobacco  Allergies  Meds   Med Hx  Surg Hx   Fam Hx  Soc Hx        NPO Detail:  NPO/Void Status  Date of Last Liquid: 01/06/25  Time of Last Liquid:  0500  Date of Last Solid: 01/05/25  Time of Last Solid: 1800  Last Intake Type: Clear fluids         Physical Exam    Airway  Mallampati: IV  TM distance: >3 FB  Neck ROM: limited     Cardiovascular   Rhythm: regular  Rate: normal     Dental    Pulmonary   Breath sounds clear to auscultation  (+) decreased breath sounds     Abdominal      Other findings: Decreased at bases otherwise CTA      Results for orders placed in visit on 04/28/23    Echocardiogram    Narrative  [facility], [address] [place] [address]  Tel [phone] and Fax [phone]    TRANSTHORACIC ECHOCARDIOGRAM REPORT      Patient Name:     ADAM AYON ROMIE Reading Physician:   [id number] Froilan Dela Cruz MD  Study Date:       5/3/2023      Referring Physician: SHARIF TALAVERA  MRN/PID:          [id number]      PCP:  Accession/Order#: [id number]  Department Location: [facility]  YOB: 1955     Fellow:  Gender:           F             Nurse:  Admit Date:                     Sonographer:         Faith Molina [facility]  Admission Status: Outpatient    Additional Staff:  Height:           160.02 cm     CC Report to:  Weight:           83.01 kg      Study Type:          Echocardiogram  BSA:              1.86 m2  Blood Pressure: 141 /82 mmHg    Diagnosis/ICD: R06.02-Shortness of breath  Indication:    Shortness of breath  Procedure/CPT: Echo Complete w Full [id number]    Patient History:  Pertinent History: Dyspnea, HTN, LE Edema and Thyroid disease.    Study Detail: The following Echo studies were performed: 2D, M-Mode, Doppler and  color flow.      PHYSICIAN INTERPRETATION:  Left Ventricle: The left ventricular systolic function is normal, with an estimated ejection fraction of 55-60%. There are no regional wall motion abnormalities. The left ventricular cavity size is normal. Spectral Doppler shows an impaired relaxation pattern of left ventricular diastolic filling.  Left Atrium: The left atrium is normal in  size.  Right Ventricle: The right ventricle is normal in size. There is normal right ventricular global systolic function.  Right Atrium: The right atrium is normal in size.  Aortic Valve: The aortic valve is probably trileaflet. There is no evidence of aortic valve stenosis.  There is moderate to severe aortic valve regurgitation. The peak instantaneous gradient of the aortic valve is 12.0 mmHg.  Mitral Valve: The mitral valve is normal in structure. There is mild mitral valve regurgitation.  Tricuspid Valve: The tricuspid valve is structurally normal. There is mild tricuspid regurgitation.  Pulmonic Valve: The pulmonic valve is not well visualized. The pulmonic valve regurgitation was not well visualized.  Pericardium: There is no pericardial effusion noted.  Aorta: The aortic root is normal.  Pulmonary Artery: The tricuspid regurgitant velocity is 2.85 m/s, and with an estimated right atrial pressure of 10 mmHg, the estimated pulmonary artery pressure is mildly elevated with the RVSP at 42.5 mmHg.  Systemic Veins: The inferior vena cava appears to be of normal size.      CONCLUSIONS:  1. Left ventricular systolic function is normal with a 55-60% estimated ejection fraction.  2. Spectral Doppler shows an impaired relaxation pattern of left ventricular diastolic filling.  3. Moderate to severe aortic valve regurgitation.  4. There is mild mitral and tricuspid regurgitation.  5. The estimated pulmonary artery pressure is mildly elevated with the RVSP at 42.5 mmHg.    QUANTITATIVE DATA SUMMARY:  2D MEASUREMENTS:  Normal Ranges:  Ao Root d:     2.50 cm   (2.0-3.7cm)  LAs:           3.40 cm   (2.7-4.0cm)  IVSd:          1.40 cm   (0.6-1.1cm)  LVPWd:         1.20 cm   (0.6-1.1cm)  LVIDd:         3.90 cm   (3.9-5.9cm)  LVIDs:         2.30 cm  LV Mass Index: 96.5 g/m2  LV % FS        41.0 %    LA VOLUME:  Normal Ranges:  LA Vol A4C:        45.6 ml    (22+/-6mL/m2)  LA Vol A2C:        43.5 ml  LA Vol BP:         45.9  ml  LA Vol Index A4C:  24.5ml/m2  LA Vol Index A2C:  23.4 ml/m2  LA Vol Index BP:   24.6 ml/m2  LA Area A4C:       16.2 cm2  LA Area A2C:       16.3 cm2  LA Major Axis A4C: 4.9 cm  LA Major Axis A2C: 5.2 cm  LA Volume Index:   23.7 ml/m2  LA Vol A4C:        44.0 ml  LA Vol A2C:        41.0 ml    M-MODE MEASUREMENTS:  Normal Ranges:  Ao Root: 2.80 cm (2.0-3.7cm)    AORTA MEASUREMENTS:  Normal Ranges:  Asc Ao, d: 2.70 cm (2.1-3.4cm)    LV SYSTOLIC FUNCTION BY 2D PLANIMETRY (MOD):  Normal Ranges:  EF-A4C View: 63.6 % (>=55%)  EF-A2C View: 62.9 %  EF-Biplane:  62.9 %    LV DIASTOLIC FUNCTION:  Normal Ranges:  MV Peak E:        1.03 m/s    (0.7-1.2 m/s)  MV Peak A:        1.32 m/s    (0.42-0.7 m/s)  E/A Ratio:        0.78        (1.0-2.2)  MV lateral e'     0.11 m/s  MV medial e'      0.09 m/s  MV A Dur:         98.00 msec  E/e' Ratio:       9.60        (<8.0)  PulmV Sys Osbaldo:    70.10 cm/s  PulmV Razo Osbaldo:   53.80 cm/s  PulmV S/D Osbaldo:    1.30  PulmV A Revs Osbaldo: 29.10 cm/s  PulmV A Revs Dur: 100.00 msec    MITRAL VALVE:  Normal Ranges:  MV Vmax:    1.42 m/s (<=1.3m/s)  MV peak P.1 mmHg (<5mmHg)  MV mean P.0 mmHg (<2mmHg)  MV DT:      193 msec (150-240msec)    AORTIC VALVE:  Normal Ranges:  AoV Vmax:      1.73 m/s  (<=1.7m/s)  AoV Peak P.0 mmHg (<20mmHg)  LVOT Max Osbaldo:  1.32 m/s  (<=1.1m/s)  LVOT VTI:      28.20 cm  LVOT Diameter: 2.10 cm   (1.8-2.4cm)  AoV Area,Vmax: 2.64 cm2  (2.5-4.5cm2)    AORTIC INSUFFICIENCY:  AI Vmax:       4.77 m/s  AI Half-time:  288 msec  AI Decel Rate: 437.00 cm/s2    RIGHT VENTRICLE:  RV 1   3.30 cm  RV 2   2.68 cm  RV 3   6.21 cm  TAPSE: 24.6 mm    TRICUSPID VALVE/RVSP:  Normal Ranges:  Peak TR Velocity: 2.85 m/s  Est. RA Pressure: 10 mmHg  RV Syst Pressure: 42.5 mmHg (< 30mmHg)    PULMONIC VALVE:  Normal Ranges:  PV Max Osbaldo: 1.2 m/s  (0.6-0.9m/s)  PV Max P.3 mmHg    Pulmonary Veins:  PulmV A Revs Dur: 100.00 msec  PulmV A Revs Osbaldo: 29.10 cm/s  PulmV Razo Osbaldo:   53.80  cm/s  PulmV S/D Osbaldo:    1.30  PulmV Sys Osbaldo:    70.10 cm/s      Cardiac cath noted in 2023, Mild CAD only.       Anesthesia Plan    History of general anesthesia?: yes  History of complications of general anesthesia?: no    ASA 3     general   (GA ETT   Videolaryngoscope  Preop albuterol neb for prophylaxis  Family at bedside)  intravenous induction   Anesthetic plan and risks discussed with patient.

## 2025-01-06 NOTE — LETTER
Dear, Chantelle Rao       12/19/2024                                                                                               INFORMATION FOR YOUR PROCEDURE    INSTRUCTIONS MUST BE FOLLOWED OR YOU RUN THE RISK OF YOUR CASE BEING CANCELED    Information is attached to this e-mail for your upcoming procedure. (Look for the paperclip in your email to access this information)  Lab requisitions (if needed), are also included as well as procedural instructions.       You are scheduled for your Bronchoscopy on 1/6/2025  , with Dr. Monique Zamora [facility] [address] [place] [address]    09:30 AM    - CT  Scan  [facility]   2nd Floor Radiology  [address]    When finished with the CT scan,   please make your way to [facility] Room 1300.  This is right around the corner from [facility].  Located on the first floor.  There are information desks there for your convenience and if you have questions.    Check In will be at  10:30  AM.  This allows us to prepare you for the actual procedure.                                        Bronchoscopy   Location:  [address] [facility]                                         Bronchoscopy / Endoscopy Suite    NPO (No food or drink) after midnight the night before your procedure. This includes coffee, water, and soda, hard candy, gum or mints.  If taking your morning medications, a small sip of water is allowed to get the medication down.    For your safety, you must have a responsible, adult  accompany you to your procedure.  You will not be permitted to drive yourself home if you have received any type of anesthesia or sedation.    Automated calls about your upcoming scheduled appointments can be quite confusing for patients.    The times may vary depending on what you have scheduled for procedure day. Follow the time/s I have given you on your information sheet so there is no confusion.  Be aware you may receive  conflicting times from different departments.      Blood work will need to be done prior to your procedure, preferably at a  Facility.  There are no restrictions for testing. I have attached a requisition for you.    Dr. Froylan Solis, will call you on 1/2/25, @ 1:30 PM    This is a phone visit to go over your medical history prior to your procedure, and is a necessary part of your medical workup.  The patient must be present at this visit.      Please reach out to me with any questions you may have  Marie  [phone] or Kevin @ [phone]    If you have an emergency (weather or other) on the day of your scheduled procedure and need to cancel for any reason, Please call the Endoscopy Suite @ [phone],  Otherwise, call Marie @ [phone]      Have a nice day!    Marie Pacheco    Bronchoscopy   Interventional Pulmonology    MD Alicia Evans MD Sameer Avasarala, MD Catalina Teba, MD Andrew Dunatchik, MD Sruti Brahmandam, MD      [facility]  Pulmonary, Critical Care and Sleep Medicine  [address] [place] [address]  P -[phone]  - [phone]  [email or web address]

## 2025-01-06 NOTE — ANESTHESIA POSTPROCEDURE EVALUATION
Patient: Chantelle Rao    Procedure Summary       Date: 01/06/25 Room / Location: [facility]    Anesthesia Start: 1200 Anesthesia Stop: 1333    Procedure: BRONCHOSCOPY Diagnosis: Lung nodules    Scheduled Providers: Monique Zamora MD; Kathleen Portillo MD Responsible Provider: Kathleen Portillo MD    Anesthesia Type: general ASA Status: 3            Anesthesia Type: general    Vitals Value Taken Time   /64 01/06/25 1330   Temp 36.5 °C (97.7 °F) 01/06/25 1330   Pulse 84 01/06/25 1330   Resp 25 01/06/25 1330   SpO2 100 % 01/06/25 1330       Anesthesia Post Evaluation    Patient location during evaluation: PACU  Patient participation: complete - patient participated  Level of consciousness: awake and alert  Pain management: satisfactory to patient  Airway patency: patent  Cardiovascular status: acceptable  Respiratory status: acceptable  Hydration status: acceptable  Postoperative Nausea and Vomiting: none    There were no known notable events for this encounter.

## 2025-01-06 NOTE — INTERVAL H&P NOTE
H&P reviewed. The patient was examined and there are no changes to the H&P.      Plan for robotic bronchoscopy for lll nodule and staging ebus    Monique Zamora MD

## 2025-01-07 ASSESSMENT — PAIN SCALES - GENERAL: PAINLEVEL_OUTOF10: 0 - NO PAIN

## 2025-01-08 ENCOUNTER — OFFICE VISIT (OUTPATIENT)
Dept: HEMATOLOGY/ONCOLOGY | Facility: HOSPITAL | Age: 70
End: 2025-01-08
Payer: MEDICARE

## 2025-01-08 VITALS
HEART RATE: 80 BPM | SYSTOLIC BLOOD PRESSURE: 113 MMHG | BODY MASS INDEX: 25.75 KG/M2 | DIASTOLIC BLOOD PRESSURE: 74 MMHG | TEMPERATURE: 96.4 F | OXYGEN SATURATION: 95 % | HEIGHT: 64 IN | RESPIRATION RATE: 16 BRPM

## 2025-01-08 DIAGNOSIS — R91.8 LUNG NODULES: ICD-10-CM

## 2025-01-08 LAB
BACTERIA SPEC RESP CULT: NORMAL
GRAM STN SPEC: NORMAL
GRAM STN SPEC: NORMAL
LABORATORY COMMENT REPORT: NORMAL
LABORATORY COMMENT REPORT: NORMAL
PATH REPORT.FINAL DX SPEC: NORMAL
PATH REPORT.GROSS SPEC: NORMAL
PATH REPORT.INTRAOP OBS SPEC DOC: NORMAL
PATH REPORT.TOTAL CANCER: NORMAL
RESIDENT REVIEW: NORMAL

## 2025-01-08 PROCEDURE — 1123F ACP DISCUSS/DSCN MKR DOCD: CPT | Performed by: INTERNAL MEDICINE

## 2025-01-08 PROCEDURE — 1126F AMNT PAIN NOTED NONE PRSNT: CPT | Performed by: INTERNAL MEDICINE

## 2025-01-08 PROCEDURE — 1157F ADVNC CARE PLAN IN RCRD: CPT | Performed by: INTERNAL MEDICINE

## 2025-01-08 PROCEDURE — 99215 OFFICE O/P EST HI 40 MIN: CPT | Performed by: INTERNAL MEDICINE

## 2025-01-08 PROCEDURE — 1159F MED LIST DOCD IN RCRD: CPT | Performed by: INTERNAL MEDICINE

## 2025-01-08 PROCEDURE — 3074F SYST BP LT 130 MM HG: CPT | Performed by: INTERNAL MEDICINE

## 2025-01-08 PROCEDURE — 3078F DIAST BP <80 MM HG: CPT | Performed by: INTERNAL MEDICINE

## 2025-01-08 ASSESSMENT — COLUMBIA-SUICIDE SEVERITY RATING SCALE - C-SSRS
1. IN THE PAST MONTH, HAVE YOU WISHED YOU WERE DEAD OR WISHED YOU COULD GO TO SLEEP AND NOT WAKE UP?: NO
2. HAVE YOU ACTUALLY HAD ANY THOUGHTS OF KILLING YOURSELF?: NO
6. HAVE YOU EVER DONE ANYTHING, STARTED TO DO ANYTHING, OR PREPARED TO DO ANYTHING TO END YOUR LIFE?: NO

## 2025-01-08 ASSESSMENT — PAIN SCALES - GENERAL: PAINLEVEL_OUTOF10: 0-NO PAIN

## 2025-01-09 LAB — SCAN RESULT: NORMAL

## 2025-01-09 ASSESSMENT — ENCOUNTER SYMPTOMS
GASTROINTESTINAL NEGATIVE: 1
RESPIRATORY NEGATIVE: 1
CONSTITUTIONAL NEGATIVE: 1
CARDIOVASCULAR NEGATIVE: 1

## 2025-01-09 NOTE — PROGRESS NOTES
"Patient ID: Chantelle Rao is a 69 y.o. female.    Subjective:  Returns for follow up for leukocytosis and elevated ferritin level. Denies having new complaints.   Had bronchoscopy last week for a suspicious nodule. Had quit smoking last year.     Assessment/Plan:  ? Leukocytosis: This seems to have been mild and reactive. Last week it was WNL. Flow was neg and neutrophil predominant    ? Elevated ferritin: Ferritin has been at 200s which in fact is within normal levels for a woman at her age. Also, iron saturation was OK. No sign of iron overload. HFE gene analysis was negative too. No need for further testing    ? Lung nodule: L lung base nodule, cystic. Found in early 2024. CT scans in Nov 2024 showed increase in size with mild uptake in PET/CT => Had bronchoscopy in Jan 2025. CT done same day showed decrease in size so biopsy could not be done. BAL and aspiration form lymph nodes were negative. Due to her smoking history, this needs to be followed with repeat CT scans. She prefers to follow up with Pulmonology.     At this time, no sign of a Hematological disorder. Does nto need further follow up with us. I reiterated importance of following up withi Pulmonology.     Review Of Systems:  Review of Systems   Constitutional: Negative.    HENT:  Negative.     Respiratory: Negative.     Cardiovascular: Negative.    Gastrointestinal: Negative.        Physical Exam:  /74 (BP Location: Left arm, Patient Position: Sitting, BP Cuff Size: Adult)   Pulse 80   Temp 35.8 °C (96.4 °F) (Temporal)   Resp 16   Ht 1.626 m (5' 4\")   SpO2 95%   BMI 25.75 kg/m²   BSA: 1.75 meters squared  Performance Status: Asymptomatic  Physical Exam  HENT:      Head: Normocephalic and atraumatic.   Eyes:      General: No scleral icterus.  Pulmonary:      Effort: Pulmonary effort is normal.   Musculoskeletal:         General: Normal range of motion.   Skin:     Coloration: Skin is not jaundiced.   Neurological:      General: No focal " deficit present.      Mental Status: She is alert and oriented to person, place, and time.         Results:  Diagnostic Results   Lab Results   Component Value Date    WBC 9.0 01/02/2025    HGB 13.5 01/02/2025    HCT 41.5 01/02/2025    MCV 88 01/02/2025     01/02/2025     Lab Results   Component Value Date    CALCIUM 9.4 01/02/2025     (L) 01/02/2025    K 3.4 (L) 01/02/2025    CO2 33 (H) 01/02/2025    CL 93 (L) 01/02/2025    BUN 20 01/02/2025    CREATININE 0.86 01/02/2025    ALT 28 11/26/2024    AST 22 11/26/2024       Current Outpatient Medications:     ALPRAZolam (Xanax) 0.5 mg tablet, Take 1 tablet (0.5 mg) by mouth 3 times a day as needed for anxiety., Disp: 90 tablet, Rfl: 0    ALPRAZolam (Xanax) 0.5 mg tablet, Take 1 tablet (0.5 mg) by mouth 3 times a day as needed for anxiety., Disp: 90 tablet, Rfl: 0    levalbuterol (Xopenex HFA) 45 mcg/actuation inhaler, Inhale 1-2 puffs every 6 hours if needed for wheezing., Disp: 15 g, Rfl: 11    levothyroxine (Synthroid, Levoxyl) 25 mcg tablet, Take 1 tablet (25 mcg) by mouth once daily in the morning. Take before meals., Disp: 90 tablet, Rfl: 2    oxygen (O2) gas therapy, Inhale 1 each once every 24 hours., Disp: , Rfl:     rOPINIRole (Requip) 4 mg tablet, Take 1 tablet (4 mg) by mouth 2 times a day., Disp: 180 tablet, Rfl: 0    SUMAtriptan (Imitrex) 50 mg tablet, Take 1 tablet (50 mg) by mouth 1 time if needed for migraine. May repeat after 2 hours., Disp: 27 tablet, Rfl: 3    torsemide (Demadex) 20 mg tablet, Take 2 tablets (40 mg) by mouth 2 times a day., Disp: 360 tablet, Rfl: 0    zolpidem (Ambien) 10 mg tablet, Take 1 tablet (10 mg) by mouth as needed at bedtime for sleep., Disp: 30 tablet, Rfl: 0    zolpidem (Ambien) 10 mg tablet, Take 1 tablet (10 mg) by mouth as needed at bedtime for sleep., Disp: 30 tablet, Rfl: 0     Past Surgical History:   Procedure Laterality Date    CT GUIDED PERCUTANEOUS BIOPSY LUNG  07/08/2020    HYSTERECTOMY  05/09/2014     LUMBAR LAMINECTOMY  05/09/2014     Family History   Problem Relation Name Age of Onset    Breast cancer Mother      Breast cancer Sister      Cancer Other Family History     Breast cancer Mother's Sister        reports that she quit smoking about 2 months ago. Her smoking use included cigarettes. She started smoking about 2 years ago. She has a 0.5 pack-year smoking history. She has never used smokeless tobacco.    Diagnoses and all orders for this visit:  Lung nodules  -     Referral to Hematology and Oncology       I spent more than 40 minutes for the patient today, including face-to-face conversation, pre-visit preparation, post-visit orders, and others.   Kavitha Medina MD

## 2025-01-10 LAB
FUNGUS SPEC CULT: NORMAL
FUNGUS SPEC FUNGUS STN: NORMAL

## 2025-01-11 LAB
ACID FAST STN SPEC: NORMAL
MYCOBACTERIUM SPEC CULT: NORMAL

## 2025-01-13 LAB
FUNGUS SPEC CULT: NORMAL
FUNGUS SPEC FUNGUS STN: NORMAL

## 2025-01-14 DIAGNOSIS — F41.9 ANXIETY: ICD-10-CM

## 2025-01-14 DIAGNOSIS — G47.09 OTHER INSOMNIA: ICD-10-CM

## 2025-01-14 RX ORDER — ZOLPIDEM TARTRATE 10 MG/1
10 TABLET ORAL NIGHTLY PRN
Qty: 30 TABLET | Refills: 0 | Status: SHIPPED | OUTPATIENT
Start: 2025-01-14 | End: 2025-02-13

## 2025-01-14 RX ORDER — ALPRAZOLAM 0.5 MG/1
0.5 TABLET ORAL 3 TIMES DAILY PRN
Qty: 90 TABLET | Refills: 0 | Status: SHIPPED | OUTPATIENT
Start: 2025-01-14 | End: 2025-02-13

## 2025-01-16 ENCOUNTER — APPOINTMENT (OUTPATIENT)
Dept: PRIMARY CARE | Facility: CLINIC | Age: 70
End: 2025-01-16
Payer: MEDICARE

## 2025-01-20 LAB
FUNGUS SPEC CULT: NORMAL
FUNGUS SPEC FUNGUS STN: NORMAL

## 2025-01-27 LAB
FUNGUS SPEC CULT: NORMAL
FUNGUS SPEC FUNGUS STN: NORMAL

## 2025-01-28 ENCOUNTER — TELEPHONE (OUTPATIENT)
Dept: CARDIAC SURGERY | Facility: CLINIC | Age: 70
End: 2025-01-28

## 2025-01-28 ENCOUNTER — APPOINTMENT (OUTPATIENT)
Dept: PRIMARY CARE | Facility: CLINIC | Age: 70
End: 2025-01-28
Payer: MEDICARE

## 2025-01-28 NOTE — TELEPHONE ENCOUNTER
Touched base with Chantelle- last we spoke she was ill and rescheduling her lung bx. She reports that she has had her biopsy and has followed up with hematology regarding results. She tells us their recommendation is to follow up with a pulmonologist and she has plans to see Kelle Ascencio, or Eliud. She does not want to schedule a follow up with Dr. Larkin at this time and will contact us if she would like to.

## 2025-01-29 ENCOUNTER — PATIENT OUTREACH (OUTPATIENT)
Dept: PRIMARY CARE | Facility: CLINIC | Age: 70
End: 2025-01-29
Payer: MEDICARE

## 2025-02-25 ENCOUNTER — APPOINTMENT (OUTPATIENT)
Dept: PRIMARY CARE | Facility: CLINIC | Age: 70
End: 2025-02-25
Payer: MEDICARE

## 2025-02-25 VITALS
SYSTOLIC BLOOD PRESSURE: 124 MMHG | OXYGEN SATURATION: 95 % | HEART RATE: 100 BPM | DIASTOLIC BLOOD PRESSURE: 80 MMHG | TEMPERATURE: 97.1 F

## 2025-02-25 DIAGNOSIS — G25.81 RLS (RESTLESS LEGS SYNDROME): ICD-10-CM

## 2025-02-25 DIAGNOSIS — R79.9 ABNORMAL FINDING OF BLOOD CHEMISTRY, UNSPECIFIED: ICD-10-CM

## 2025-02-25 DIAGNOSIS — Z13.1 DIABETES MELLITUS SCREENING: ICD-10-CM

## 2025-02-25 DIAGNOSIS — Z13.89 SCREENING FOR MULTIPLE CONDITIONS: ICD-10-CM

## 2025-02-25 DIAGNOSIS — E55.9 VITAMIN D DEFICIENCY: ICD-10-CM

## 2025-02-25 DIAGNOSIS — G47.09 OTHER INSOMNIA: ICD-10-CM

## 2025-02-25 DIAGNOSIS — F41.9 ANXIETY: ICD-10-CM

## 2025-02-25 DIAGNOSIS — I10 PRIMARY HYPERTENSION: ICD-10-CM

## 2025-02-25 DIAGNOSIS — Z13.29 THYROID DISORDER SCREENING: ICD-10-CM

## 2025-02-25 DIAGNOSIS — J01.10 SUBACUTE FRONTAL SINUSITIS: Primary | ICD-10-CM

## 2025-02-25 DIAGNOSIS — E03.8 OTHER SPECIFIED HYPOTHYROIDISM: ICD-10-CM

## 2025-02-25 DIAGNOSIS — Z00.00 HEALTHCARE MAINTENANCE: ICD-10-CM

## 2025-02-25 DIAGNOSIS — Z13.220 SCREENING FOR HYPERLIPIDEMIA: ICD-10-CM

## 2025-02-25 PROCEDURE — 99214 OFFICE O/P EST MOD 30 MIN: CPT

## 2025-02-25 PROCEDURE — 3079F DIAST BP 80-89 MM HG: CPT

## 2025-02-25 PROCEDURE — 3074F SYST BP LT 130 MM HG: CPT

## 2025-02-25 PROCEDURE — 1123F ACP DISCUSS/DSCN MKR DOCD: CPT

## 2025-02-25 PROCEDURE — 1157F ADVNC CARE PLAN IN RCRD: CPT

## 2025-02-25 PROCEDURE — G2211 COMPLEX E/M VISIT ADD ON: HCPCS

## 2025-02-25 RX ORDER — ALPRAZOLAM 0.5 MG/1
0.5 TABLET ORAL 3 TIMES DAILY PRN
Qty: 90 TABLET | Refills: 0 | Status: SHIPPED | OUTPATIENT
Start: 2025-02-25 | End: 2025-03-27

## 2025-02-25 RX ORDER — ZOLPIDEM TARTRATE 10 MG/1
10 TABLET ORAL NIGHTLY PRN
Qty: 30 TABLET | Refills: 0 | Status: SHIPPED | OUTPATIENT
Start: 2025-02-25 | End: 2025-03-27

## 2025-02-25 RX ORDER — LEVOTHYROXINE SODIUM 25 UG/1
25 TABLET ORAL
Qty: 90 TABLET | Refills: 2 | Status: SHIPPED | OUTPATIENT
Start: 2025-02-25 | End: 2025-11-22

## 2025-02-25 RX ORDER — AMOXICILLIN AND CLAVULANATE POTASSIUM 875; 125 MG/1; MG/1
875 TABLET, FILM COATED ORAL 2 TIMES DAILY
Qty: 20 TABLET | Refills: 0 | Status: SHIPPED | OUTPATIENT
Start: 2025-02-25 | End: 2025-03-07

## 2025-02-25 RX ORDER — AMOXICILLIN AND CLAVULANATE POTASSIUM 875; 125 MG/1; MG/1
875 TABLET, FILM COATED ORAL 2 TIMES DAILY
Qty: 20 TABLET | Refills: 0 | Status: SHIPPED | OUTPATIENT
Start: 2025-02-25 | End: 2025-02-25

## 2025-02-25 RX ORDER — TORSEMIDE 20 MG/1
40 TABLET ORAL 2 TIMES DAILY
Qty: 360 TABLET | Refills: 0 | Status: SHIPPED | OUTPATIENT
Start: 2025-02-25 | End: 2025-05-26

## 2025-02-25 RX ORDER — ROPINIROLE 4 MG/1
4 TABLET, FILM COATED ORAL 2 TIMES DAILY
Qty: 180 TABLET | Refills: 0 | Status: SHIPPED | OUTPATIENT
Start: 2025-02-25 | End: 2025-05-26

## 2025-02-25 NOTE — PROGRESS NOTES
Patient ID: Chantelle Rao is a 69 y.o. female who presents for Results (Would like to discuss her recent result), Palpitations (With simple activity in her home), Shortness of Breath (With palpitations reports it will be hard to catch her breath), and Fall (Fell on the ice 4 weeks and hit the back of her head, she did not go get evaluated. She reports she is still feeling tender in the back of her head).  HPI    Patient presents status post fall 4 weeks ago did hit the back of her head but did not seek ER help, neuroexam was negative today she is not currently on anticoagulation, there is slight tenderness on the back of the head otherwise negative exam    Palpitations  She is concerned that her thyroid is off, check TSH T4 T3  If negative consider Holter monitor she declines at this time  Recommend she follow-up with a cardiologist as well    Vitals:    02/25/25 1541   BP: 124/80   Pulse: 100   Temp: 36.2 °C (97.1 °F)   SpO2: 95%       Review of Systems   Constitutional:  Negative for activity change, fatigue, fever and unexpected weight change.   HENT: Negative.  Negative for hearing loss.    Eyes:  Positive for discharge and itching.   Respiratory: Negative.  Negative for shortness of breath.    Cardiovascular: Negative.  Negative for chest pain.   Gastrointestinal: Negative.  Negative for abdominal pain.   Endocrine: Negative.    Genitourinary: Negative.    Musculoskeletal: Negative.    Skin: Negative.    Allergic/Immunologic: Negative.    Neurological:  Positive for headaches. Negative for dizziness and weakness.   Hematological: Negative.    Psychiatric/Behavioral: Negative.         Objective   Physical Exam  Vitals and nursing note reviewed.   Constitutional:       Appearance: Normal appearance. She is ill-appearing.   HENT:      Head: Normocephalic.      Nose: Congestion and rhinorrhea present.      Mouth/Throat:      Mouth: Mucous membranes are moist.      Pharynx: Oropharynx is clear.   Eyes:      General:          Right eye: Discharge present.         Left eye: Discharge present.  Cardiovascular:      Rate and Rhythm: Normal rate and regular rhythm.      Pulses: Normal pulses.      Heart sounds: Normal heart sounds. No murmur heard.     No friction rub. No gallop.   Pulmonary:      Effort: Pulmonary effort is normal. No respiratory distress.      Breath sounds: Normal breath sounds. No wheezing.   Abdominal:      General: Bowel sounds are normal. There is no distension.      Palpations: Abdomen is soft.      Tenderness: There is no abdominal tenderness.   Musculoskeletal:         General: No deformity. Normal range of motion.   Skin:     General: Skin is warm and dry.      Capillary Refill: Capillary refill takes less than 2 seconds.   Neurological:      General: No focal deficit present.      Mental Status: She is alert and oriented to person, place, and time. Mental status is at baseline.      GCS: GCS eye subscore is 4. GCS verbal subscore is 5. GCS motor subscore is 6.      Cranial Nerves: Cranial nerves 2-12 are intact.      Sensory: Sensation is intact. No sensory deficit.      Motor: Motor function is intact. No weakness, tremor or atrophy.      Coordination: Coordination is intact. Romberg sign negative. Finger-Nose-Finger Test normal.      Gait: Gait is intact.   Psychiatric:         Mood and Affect: Mood normal.         Assessment/Plan   Problem List Items Addressed This Visit       Anxiety    Relevant Medications    ALPRAZolam (Xanax) 0.5 mg tablet    HTN (hypertension)    Relevant Medications    torsemide (Demadex) 20 mg tablet    Insomnia    Relevant Medications    zolpidem (Ambien) 10 mg tablet    RLS (restless legs syndrome)    Relevant Medications    rOPINIRole (Requip) 4 mg tablet    Vitamin D deficiency    Relevant Orders    Vitamin D 25-Hydroxy,Total (for eval of Vitamin D levels)    Hypothyroidism    Relevant Medications    levothyroxine (Synthroid, Levoxyl) 25 mcg tablet     Other Visit Diagnoses        Subacute frontal sinusitis    -  Primary    Relevant Medications    amoxicillin-pot clavulanate (Augmentin) 875-125 mg tablet    Screening for multiple conditions        Relevant Orders    CBC and Auto Differential    Healthcare maintenance        Relevant Orders    Comprehensive Metabolic Panel    Screening for hyperlipidemia        Relevant Orders    Lipid Panel    Thyroid disorder screening        Relevant Orders    TSH with reflex to Free T4 if abnormal    Diabetes mellitus screening        Relevant Orders    Hemoglobin A1C    Abnormal finding of blood chemistry, unspecified        Relevant Orders    CBC and Auto Differential    Hemoglobin A1C                 Thank you for coming in today, please call my office if you have any concerns or questions.     Antonio STACK, CNP

## 2025-02-25 NOTE — PATIENT INSTRUCTIONS
Antibiotic   Med refill  Labs today    Thank you for coming in today, if any questions or concerns arise, please call my office.   SEDA Crane-CNP

## 2025-02-26 ENCOUNTER — PATIENT OUTREACH (OUTPATIENT)
Dept: PRIMARY CARE | Facility: CLINIC | Age: 70
End: 2025-02-26
Payer: MEDICARE

## 2025-02-26 ASSESSMENT — ENCOUNTER SYMPTOMS
FATIGUE: 0
ENDOCRINE NEGATIVE: 1
ACTIVITY CHANGE: 0
MUSCULOSKELETAL NEGATIVE: 1
EYE DISCHARGE: 1
WEAKNESS: 0
GASTROINTESTINAL NEGATIVE: 1
HEMATOLOGIC/LYMPHATIC NEGATIVE: 1
HEADACHES: 1
CARDIOVASCULAR NEGATIVE: 1
PSYCHIATRIC NEGATIVE: 1
ABDOMINAL PAIN: 0
FEVER: 0
SHORTNESS OF BREATH: 0
DIZZINESS: 0
RESPIRATORY NEGATIVE: 1
EYE ITCHING: 1
UNEXPECTED WEIGHT CHANGE: 0
ALLERGIC/IMMUNOLOGIC NEGATIVE: 1

## 2025-02-27 LAB
25(OH)D3+25(OH)D2 SERPL-MCNC: 34 NG/ML (ref 30–100)
ALBUMIN SERPL-MCNC: 5 G/DL (ref 3.6–5.1)
ALP SERPL-CCNC: 92 U/L (ref 37–153)
ALT SERPL-CCNC: 10 U/L (ref 6–29)
ANION GAP SERPL CALCULATED.4IONS-SCNC: 14 MMOL/L (CALC) (ref 7–17)
AST SERPL-CCNC: 15 U/L (ref 10–35)
BASOPHILS # BLD AUTO: 70 CELLS/UL (ref 0–200)
BASOPHILS NFR BLD AUTO: 0.8 %
BILIRUB SERPL-MCNC: 0.4 MG/DL (ref 0.2–1.2)
BUN SERPL-MCNC: 20 MG/DL (ref 7–25)
CALCIUM SERPL-MCNC: 9.5 MG/DL (ref 8.6–10.4)
CHLORIDE SERPL-SCNC: 99 MMOL/L (ref 98–110)
CHOLEST SERPL-MCNC: 256 MG/DL
CHOLEST/HDLC SERPL: 4.5 (CALC)
CO2 SERPL-SCNC: 28 MMOL/L (ref 20–32)
CREAT SERPL-MCNC: 0.97 MG/DL (ref 0.5–1.05)
EGFRCR SERPLBLD CKD-EPI 2021: 63 ML/MIN/1.73M2
EOSINOPHIL # BLD AUTO: 185 CELLS/UL (ref 15–500)
EOSINOPHIL NFR BLD AUTO: 2.1 %
ERYTHROCYTE [DISTWIDTH] IN BLOOD BY AUTOMATED COUNT: 13.3 % (ref 11–15)
EST. AVERAGE GLUCOSE BLD GHB EST-MCNC: 114 MG/DL
EST. AVERAGE GLUCOSE BLD GHB EST-SCNC: 6.3 MMOL/L
GLUCOSE SERPL-MCNC: 96 MG/DL (ref 65–99)
HBA1C MFR BLD: 5.6 % OF TOTAL HGB
HCT VFR BLD AUTO: 42.2 % (ref 35–45)
HDLC SERPL-MCNC: 57 MG/DL
HGB BLD-MCNC: 14 G/DL (ref 11.7–15.5)
LDLC SERPL CALC-MCNC: 169 MG/DL (CALC)
LYMPHOCYTES # BLD AUTO: 2895 CELLS/UL (ref 850–3900)
LYMPHOCYTES NFR BLD AUTO: 32.9 %
MCH RBC QN AUTO: 29.4 PG (ref 27–33)
MCHC RBC AUTO-ENTMCNC: 33.2 G/DL (ref 32–36)
MCV RBC AUTO: 88.5 FL (ref 80–100)
MONOCYTES # BLD AUTO: 616 CELLS/UL (ref 200–950)
MONOCYTES NFR BLD AUTO: 7 %
NEUTROPHILS # BLD AUTO: 5034 CELLS/UL (ref 1500–7800)
NEUTROPHILS NFR BLD AUTO: 57.2 %
NONHDLC SERPL-MCNC: 199 MG/DL (CALC)
PLATELET # BLD AUTO: 318 THOUSAND/UL (ref 140–400)
PMV BLD REES-ECKER: 11.7 FL (ref 7.5–12.5)
POTASSIUM SERPL-SCNC: 3.5 MMOL/L (ref 3.5–5.3)
PROT SERPL-MCNC: 7.8 G/DL (ref 6.1–8.1)
RBC # BLD AUTO: 4.77 MILLION/UL (ref 3.8–5.1)
SODIUM SERPL-SCNC: 141 MMOL/L (ref 135–146)
TRIGL SERPL-MCNC: 156 MG/DL
TSH SERPL-ACNC: 1.28 MIU/L (ref 0.4–4.5)
WBC # BLD AUTO: 8.8 THOUSAND/UL (ref 3.8–10.8)

## 2025-03-05 LAB
ACID FAST STN SPEC: NORMAL
MYCOBACTERIUM SPEC CULT: NORMAL
SCAN RESULT: NORMAL

## 2025-04-04 ENCOUNTER — TELEPHONE (OUTPATIENT)
Dept: PRIMARY CARE | Facility: CLINIC | Age: 70
End: 2025-04-04
Payer: MEDICARE

## 2025-04-04 NOTE — TELEPHONE ENCOUNTER
[employer] called requesting a written order to dc patients oxygen. They are scheduled to pick it up on Monday but need a written order.     Fax # [phone]

## 2025-04-04 NOTE — LETTER
2025      To Whom It May Concern,    Chantelle Rao,  1955, is a patient under my care. She no longer needs her home oxygen system. Please  patients tanks and equipment at your next scheduled .       Thank you,        Antonio Arambula, CNP

## 2025-04-11 DIAGNOSIS — F41.9 ANXIETY: ICD-10-CM

## 2025-04-11 DIAGNOSIS — G47.09 OTHER INSOMNIA: ICD-10-CM

## 2025-04-11 RX ORDER — ZOLPIDEM TARTRATE 10 MG/1
10 TABLET ORAL NIGHTLY PRN
Qty: 30 TABLET | Refills: 0 | Status: SHIPPED | OUTPATIENT
Start: 2025-04-11 | End: 2025-05-11

## 2025-04-11 RX ORDER — ALPRAZOLAM 0.5 MG/1
0.5 TABLET ORAL 3 TIMES DAILY PRN
Qty: 90 TABLET | Refills: 0 | Status: SHIPPED | OUTPATIENT
Start: 2025-04-11 | End: 2025-05-11

## 2025-04-17 ENCOUNTER — APPOINTMENT (OUTPATIENT)
Dept: PRIMARY CARE | Facility: CLINIC | Age: 70
End: 2025-04-17
Payer: MEDICARE

## 2025-04-17 VITALS — SYSTOLIC BLOOD PRESSURE: 130 MMHG | HEART RATE: 72 BPM | DIASTOLIC BLOOD PRESSURE: 90 MMHG | TEMPERATURE: 97 F

## 2025-04-17 DIAGNOSIS — D51.0 PERNICIOUS ANEMIA: Primary | ICD-10-CM

## 2025-04-17 DIAGNOSIS — G25.81 RLS (RESTLESS LEGS SYNDROME): ICD-10-CM

## 2025-04-17 DIAGNOSIS — K21.9 GASTROESOPHAGEAL REFLUX DISEASE WITHOUT ESOPHAGITIS: ICD-10-CM

## 2025-04-17 DIAGNOSIS — H65.03 NON-RECURRENT ACUTE SEROUS OTITIS MEDIA OF BOTH EARS: ICD-10-CM

## 2025-04-17 DIAGNOSIS — R06.09 DOE (DYSPNEA ON EXERTION): ICD-10-CM

## 2025-04-17 DIAGNOSIS — J30.1 ALLERGIC RHINITIS DUE TO POLLEN, UNSPECIFIED SEASONALITY: ICD-10-CM

## 2025-04-17 PROCEDURE — 1123F ACP DISCUSS/DSCN MKR DOCD: CPT

## 2025-04-17 PROCEDURE — 3080F DIAST BP >= 90 MM HG: CPT

## 2025-04-17 PROCEDURE — 1036F TOBACCO NON-USER: CPT

## 2025-04-17 PROCEDURE — 1159F MED LIST DOCD IN RCRD: CPT

## 2025-04-17 PROCEDURE — 3075F SYST BP GE 130 - 139MM HG: CPT

## 2025-04-17 PROCEDURE — 99214 OFFICE O/P EST MOD 30 MIN: CPT

## 2025-04-17 PROCEDURE — 1157F ADVNC CARE PLAN IN RCRD: CPT

## 2025-04-17 RX ORDER — OFLOXACIN 3 MG/ML
5 SOLUTION AURICULAR (OTIC) 2 TIMES DAILY
Qty: 0.35 ML | Refills: 0 | Status: SHIPPED | OUTPATIENT
Start: 2025-04-17 | End: 2025-04-24

## 2025-04-17 RX ORDER — ROPINIROLE 4 MG/1
4 TABLET, FILM COATED ORAL 2 TIMES DAILY
Qty: 180 TABLET | Refills: 3 | Status: SHIPPED | OUTPATIENT
Start: 2025-04-17

## 2025-04-17 RX ORDER — LORATADINE 10 MG/1
10 TABLET ORAL DAILY
Qty: 30 TABLET | Refills: 0 | Status: SHIPPED | OUTPATIENT
Start: 2025-04-17 | End: 2025-05-17

## 2025-04-17 RX ORDER — ESOMEPRAZOLE MAGNESIUM 40 MG/1
40 CAPSULE, DELAYED RELEASE ORAL
Qty: 30 CAPSULE | Refills: 1 | Status: SHIPPED | OUTPATIENT
Start: 2025-04-17 | End: 2025-06-16

## 2025-04-17 NOTE — PROGRESS NOTES
Subjective   Patient ID: Chantelle Rao is a 69 y.o. female who presents for Back Pain (Went to bridal shower last Saturday when her lower back started hurting. She reports pain radiates into R hip. Denies any frequency, urgency or burning with urination. Reports having some swelling in her lower legs yesterday.), low pulse (Reports not feeling right, states she feels like she has no energy. Her pulse was 54, then when she gets up to do something her pulse will go to 110. ), Ear Fullness (Bilateral ear, pain in R ear), and metallic taste (Reports liquids including water taste metallic).  HPI    Patient presents for acute onset of back pain after the weekend at a bridal shower when it started hurting radiating to the right hip into the right side of abdomen  No trouble walking, no falls    Worsening onset of leg swelling bilateral legs, shortness of breath on exertion with abnormal pulse rate from the 50s to 110s at home  Exercise intolerance  --Repeat echo  -- Holter monitor for 7 days  -- Recommend patient return to cardiologist for workup    Ear pain  Bilateral TMs erythematous without bulging without retraction or drainage  --Otitis media, start ofloxacin eardrops and Claritin for allergies    Metallic taste in mouth  -- Worsening epigastric pain, worse with lying flat in the middle of the night  -- Taking Tums with slight relief, start Nexium  -- Better eating habits including sitting up after meals chin tuck method for swallowing    Profound fatigue  -- Differential includes worsening of valvular dysfunction, B12 deficiency, she received B12 injections with her previous PCP, check B12 today  -- Also included in the differentials are worsening obstructive lung disease as noted in PFT  -- This was discussed today she expressly declines taking steroid inhaler and she continues to Xopenex  -- Likely patient would greatly benefit from upgrading inhaler per Gold guidelines      Vitals:    04/17/25 0814   BP: 130/90    Pulse: 72   Temp: 36.1 °C (97 °F)       Review of Systems    Objective   Physical Exam  Vitals and nursing note reviewed.   Constitutional:       Appearance: Normal appearance. She is not ill-appearing.   HENT:      Right Ear: Tympanic membrane is erythematous.      Left Ear: Tympanic membrane is erythematous.   Cardiovascular:      Heart sounds: No murmur heard.  Neurological:      Mental Status: She is alert.         Assessment/Plan   Problem List Items Addressed This Visit       Allergic rhinitis    Relevant Medications    loratadine (Claritin) 10 mg tablet    Esophageal reflux    Relevant Medications    esomeprazole (NexIUM) 40 mg DR capsule     Other Visit Diagnoses         Pernicious anemia    -  Primary    Relevant Orders    Vitamin B12      CHAVIRA (dyspnea on exertion)        Relevant Orders    Transthoracic Echo (TTE) Complete    Holter or Event Cardiac Monitor      Non-recurrent acute serous otitis media of both ears        Relevant Medications    ofloxacin (Floxin) 0.3 % otic solution                 Thank you for coming in today, please call my office if you have any concerns or questions.     Antonio STACK, CNP

## 2025-04-17 NOTE — PATIENT INSTRUCTIONS
ECHO, Holter monitor 7 days, I would recommend following up with Dr. Dela Cruz as well.  Check B12 today    Ear drops and claritin for the ear infection  Exercise, stretching for the back    Thank you for coming in today, if any questions or concerns arise, please call my office.   Antonio Arambula, SEDA-CNP

## 2025-04-18 LAB — VIT B12 SERPL-MCNC: 391 PG/ML (ref 200–1100)

## 2025-04-21 ENCOUNTER — TELEPHONE (OUTPATIENT)
Dept: PRIMARY CARE | Facility: CLINIC | Age: 70
End: 2025-04-21
Payer: MEDICARE

## 2025-04-21 DIAGNOSIS — D51.0 PERNICIOUS ANEMIA: Primary | ICD-10-CM

## 2025-04-21 RX ORDER — CYANOCOBALAMIN 1000 UG/ML
1000 INJECTION, SOLUTION INTRAMUSCULAR; SUBCUTANEOUS
Qty: 3 ML | Refills: 0 | Status: SHIPPED | OUTPATIENT
Start: 2025-04-21 | End: 2025-06-21

## 2025-04-21 RX ORDER — SYRINGE W-NEEDLE,DISPOSAB,3 ML 25GX5/8"
1 SYRINGE, EMPTY DISPOSABLE MISCELLANEOUS
Qty: 3 EACH | Refills: 0 | Status: SHIPPED | OUTPATIENT
Start: 2025-04-21 | End: 2025-07-20

## 2025-04-21 NOTE — TELEPHONE ENCOUNTER
She would like to start the b12 injections. She is asking for them to be sent to [facility] so that she can give her injections to herself at home. She states she used to do them in the past.

## 2025-04-21 NOTE — TELEPHONE ENCOUNTER
----- Message from Antonio Arambula sent at 4/21/2025  3:29 PM EDT -----  Results were abnormal... low side of normal we can start injections monthly. If she is open to this we can start.   ----- Message -----  From: Head Held High Results In  Sent: 4/18/2025  10:01 PM EDT  To: Antonio Arambula, SEDA-CNP

## 2025-04-23 ENCOUNTER — APPOINTMENT (OUTPATIENT)
Dept: PRIMARY CARE | Facility: CLINIC | Age: 70
End: 2025-04-23
Payer: MEDICARE

## 2025-04-28 DIAGNOSIS — I10 PRIMARY HYPERTENSION: ICD-10-CM

## 2025-04-28 RX ORDER — TORSEMIDE 20 MG/1
40 TABLET ORAL 2 TIMES DAILY
Qty: 360 TABLET | Refills: 3 | Status: SHIPPED | OUTPATIENT
Start: 2025-04-28

## 2025-05-05 ENCOUNTER — HOSPITAL ENCOUNTER (OUTPATIENT)
Dept: CARDIOLOGY | Facility: HOSPITAL | Age: 70
Discharge: HOME | End: 2025-05-05
Payer: MEDICARE

## 2025-05-05 DIAGNOSIS — R06.09 DOE (DYSPNEA ON EXERTION): ICD-10-CM

## 2025-05-05 LAB
AORTIC VALVE PEAK VELOCITY: 1.84 M/S
AV PEAK GRADIENT: 14 MMHG
AVA (PEAK VEL): 2.16 CM2
EJECTION FRACTION APICAL 4 CHAMBER: 68.3
EJECTION FRACTION: 63 %
LEFT ATRIUM VOLUME AREA LENGTH INDEX BSA: 21.2 ML/M2
LEFT VENTRICLE INTERNAL DIMENSION DIASTOLE: 3.68 CM (ref 3.5–6)
LEFT VENTRICULAR OUTFLOW TRACT DIAMETER: 1.8 CM
MITRAL VALVE E/A RATIO: 0.84
RIGHT VENTRICLE FREE WALL PEAK S': 16.8 CM/S
RIGHT VENTRICLE PEAK SYSTOLIC PRESSURE: 30.7 MMHG
TRICUSPID ANNULAR PLANE SYSTOLIC EXCURSION: 1.7 CM

## 2025-05-05 PROCEDURE — 93306 TTE W/DOPPLER COMPLETE: CPT

## 2025-05-05 PROCEDURE — 93242 EXT ECG>48HR<7D RECORDING: CPT

## 2025-05-05 PROCEDURE — 93306 TTE W/DOPPLER COMPLETE: CPT | Performed by: INTERNAL MEDICINE

## 2025-05-08 ENCOUNTER — TELEPHONE (OUTPATIENT)
Dept: PRIMARY CARE | Facility: CLINIC | Age: 70
End: 2025-05-08
Payer: MEDICARE

## 2025-05-12 DIAGNOSIS — F41.9 ANXIETY: ICD-10-CM

## 2025-05-12 DIAGNOSIS — G47.09 OTHER INSOMNIA: ICD-10-CM

## 2025-05-12 RX ORDER — ALPRAZOLAM 0.5 MG/1
0.5 TABLET ORAL 3 TIMES DAILY PRN
Qty: 90 TABLET | Refills: 0 | Status: SHIPPED | OUTPATIENT
Start: 2025-05-12 | End: 2025-06-11

## 2025-05-12 RX ORDER — ZOLPIDEM TARTRATE 10 MG/1
10 TABLET ORAL NIGHTLY PRN
Qty: 30 TABLET | Refills: 0 | OUTPATIENT
Start: 2025-05-12 | End: 2025-06-11

## 2025-05-12 RX ORDER — ZOLPIDEM TARTRATE 10 MG/1
10 TABLET ORAL NIGHTLY PRN
Qty: 30 TABLET | Refills: 0 | Status: SHIPPED | OUTPATIENT
Start: 2025-05-12 | End: 2025-06-11

## 2025-05-12 RX ORDER — ALPRAZOLAM 0.5 MG/1
0.5 TABLET ORAL 3 TIMES DAILY PRN
Qty: 90 TABLET | Refills: 0 | OUTPATIENT
Start: 2025-05-12 | End: 2025-06-11

## 2025-06-12 ENCOUNTER — APPOINTMENT (OUTPATIENT)
Dept: PRIMARY CARE | Facility: CLINIC | Age: 70
End: 2025-06-12
Payer: MEDICARE

## 2025-06-12 VITALS
HEART RATE: 99 BPM | TEMPERATURE: 96.3 F | SYSTOLIC BLOOD PRESSURE: 118 MMHG | DIASTOLIC BLOOD PRESSURE: 66 MMHG | OXYGEN SATURATION: 98 %

## 2025-06-12 DIAGNOSIS — F41.9 ANXIETY: ICD-10-CM

## 2025-06-12 DIAGNOSIS — G47.09 OTHER INSOMNIA: ICD-10-CM

## 2025-06-12 DIAGNOSIS — M54.12 CERVICAL RADICULOPATHY: Primary | ICD-10-CM

## 2025-06-12 DIAGNOSIS — H60.313 ACUTE DIFFUSE OTITIS EXTERNA OF BOTH EARS: ICD-10-CM

## 2025-06-12 PROCEDURE — 1036F TOBACCO NON-USER: CPT

## 2025-06-12 PROCEDURE — 1159F MED LIST DOCD IN RCRD: CPT

## 2025-06-12 PROCEDURE — 3074F SYST BP LT 130 MM HG: CPT

## 2025-06-12 PROCEDURE — 99214 OFFICE O/P EST MOD 30 MIN: CPT

## 2025-06-12 PROCEDURE — 3078F DIAST BP <80 MM HG: CPT

## 2025-06-12 RX ORDER — OFLOXACIN 3 MG/ML
5 SOLUTION AURICULAR (OTIC) 2 TIMES DAILY
Qty: 0.35 ML | Refills: 0 | Status: SHIPPED | OUTPATIENT
Start: 2025-06-12 | End: 2025-06-19

## 2025-06-12 RX ORDER — ALPRAZOLAM 0.5 MG/1
0.5 TABLET ORAL 3 TIMES DAILY PRN
Qty: 90 TABLET | Refills: 0 | Status: SHIPPED | OUTPATIENT
Start: 2025-06-12 | End: 2025-07-12

## 2025-06-12 RX ORDER — ZOLPIDEM TARTRATE 10 MG/1
10 TABLET ORAL NIGHTLY PRN
Qty: 30 TABLET | Refills: 0 | Status: SHIPPED | OUTPATIENT
Start: 2025-06-12 | End: 2025-07-12

## 2025-06-12 RX ORDER — HYDROCORTISONE AND ACETIC ACID 20.75; 10.375 MG/ML; MG/ML
4 SOLUTION AURICULAR (OTIC) 4 TIMES DAILY PRN
Qty: 10 ML | Refills: 0 | Status: SHIPPED | OUTPATIENT
Start: 2025-06-12 | End: 2025-06-12

## 2025-06-12 NOTE — PATIENT INSTRUCTIONS
Ear drops   Pain med referral     Thank you for coming in today, if any questions or concerns arise, please call my office.   SEDA Crane-CNP

## 2025-06-12 NOTE — PROGRESS NOTES
Subjective     Patient ID: Chantelle Rao is a 70 y.o. female who presents for Numbness (Ongoing for a month- starts in lower arm and radiates up her upper arm into shoulder. She reports a numbness/tingling feeling 'like something is crawling in skin'), Flank Pain (R side of abdomen, ongoing for a 'while'), Leg Swelling (End of day her legs and her feet will be swollen), and Earache (Sharp pain in her R ear that comes and goes.).    Subjective  Chantelle Rao is a 70 y.o. female who presents for evaluation of neck pain. Event that precipitated these symptoms: none known. Onset of symptoms was 7 month ago, and have been gradually worsening since that time. Current symptoms are numbness in shoulder down arm. Patient denies stiffness. Patient has had recurrent self limited episodes of neck pain in the past. Previous treatments: neck support.    Objective  /66   Pulse 99   Temp 35.7 °C (96.3 °F)   SpO2 98%   General:   alert and oriented, in no acute distress  External Deformity:  absent  ROM Cervical Spine:  limited right rotation and left rotation  Midline Tenderness:  mild midline  Paraspinous tenderness:  mild midline  UE Neurologic Exam:  normal strength    X-ray of the cervical spine: not done    Assessment/Plan  Cervical pain.  Pain med referral          Vitals:    06/12/25 1317   BP: 118/66   Pulse: 99   Temp: 35.7 °C (96.3 °F)   SpO2: 98%       Review of Systems    Objective   Physical Exam    Assessment/Plan   Problem List Items Addressed This Visit       Anxiety    Relevant Medications    ALPRAZolam (Xanax) 0.5 mg tablet    Insomnia    Relevant Medications    zolpidem (Ambien) 10 mg tablet     Other Visit Diagnoses         Cervical radiculopathy    -  Primary    Relevant Orders    Referral to Pain Medicine      Acute diffuse otitis externa of both ears        Relevant Medications    ofloxacin (Floxin) 0.3 % otic solution                 Thank you for coming in today, please call my office if you have  any concerns or questions.     Antonio STACK, CNP

## 2025-06-17 DIAGNOSIS — I47.19 PAROXYSMAL SINUS TACHYCARDIA: Primary | ICD-10-CM

## 2025-06-18 PROBLEM — I47.19 PAROXYSMAL SINUS TACHYCARDIA: Status: ACTIVE | Noted: 2025-06-18

## 2025-06-18 RX ORDER — CARVEDILOL 3.12 MG/1
3.12 TABLET ORAL 2 TIMES DAILY
Qty: 60 TABLET | Refills: 0 | Status: SHIPPED | OUTPATIENT
Start: 2025-06-18 | End: 2025-07-18

## 2025-06-18 NOTE — RESULT ENCOUNTER NOTE
Results were abnormal... sinus tachycardia HR up to 100s to 110s with symptoms, as well as one run of SVT, let's try low dose coreg for a month, have her see me in 4 weeks to follow up on palpitations

## 2025-06-24 ENCOUNTER — APPOINTMENT (OUTPATIENT)
Dept: DERMATOLOGY | Facility: CLINIC | Age: 70
End: 2025-06-24
Payer: MEDICARE

## 2025-07-09 ENCOUNTER — APPOINTMENT (OUTPATIENT)
Dept: CARDIOLOGY | Facility: HOSPITAL | Age: 70
End: 2025-07-09
Payer: MEDICARE

## 2025-07-14 DIAGNOSIS — F41.9 ANXIETY: ICD-10-CM

## 2025-07-14 DIAGNOSIS — G47.09 OTHER INSOMNIA: ICD-10-CM

## 2025-07-14 RX ORDER — ALPRAZOLAM 0.5 MG/1
0.5 TABLET ORAL 3 TIMES DAILY PRN
Qty: 90 TABLET | Refills: 0 | Status: CANCELLED | OUTPATIENT
Start: 2025-07-14 | End: 2025-08-13

## 2025-07-15 RX ORDER — ALPRAZOLAM 0.5 MG/1
0.5 TABLET ORAL 3 TIMES DAILY PRN
Qty: 90 TABLET | Refills: 0 | Status: SHIPPED | OUTPATIENT
Start: 2025-07-15 | End: 2025-08-15

## 2025-07-16 RX ORDER — ZOLPIDEM TARTRATE 10 MG/1
10 TABLET ORAL NIGHTLY PRN
Qty: 30 TABLET | Refills: 0 | Status: SHIPPED | OUTPATIENT
Start: 2025-07-16 | End: 2025-08-15

## 2025-07-17 ENCOUNTER — APPOINTMENT (OUTPATIENT)
Facility: CLINIC | Age: 70
End: 2025-07-17
Payer: MEDICARE

## 2025-07-17 ENCOUNTER — OFFICE VISIT (OUTPATIENT)
Dept: CARDIOLOGY | Facility: HOSPITAL | Age: 70
End: 2025-07-17
Payer: MEDICARE

## 2025-07-17 VITALS — OXYGEN SATURATION: 95 % | HEART RATE: 85 BPM | DIASTOLIC BLOOD PRESSURE: 66 MMHG | SYSTOLIC BLOOD PRESSURE: 121 MMHG

## 2025-07-17 DIAGNOSIS — J44.9 CHRONIC OBSTRUCTIVE PULMONARY DISEASE, UNSPECIFIED COPD TYPE (MULTI): Primary | ICD-10-CM

## 2025-07-17 DIAGNOSIS — I10 HYPERTENSION, UNSPECIFIED TYPE: ICD-10-CM

## 2025-07-17 LAB
ATRIAL RATE: 83 BPM
P AXIS: 71 DEGREES
P OFFSET: 202 MS
P ONSET: 148 MS
PR INTERVAL: 134 MS
Q ONSET: 215 MS
QRS COUNT: 14 BEATS
QRS DURATION: 86 MS
QT INTERVAL: 402 MS
QTC CALCULATION(BAZETT): 472 MS
QTC FREDERICIA: 448 MS
R AXIS: 89 DEGREES
T AXIS: 41 DEGREES
T OFFSET: 416 MS
VENTRICULAR RATE: 83 BPM

## 2025-07-17 PROCEDURE — 3074F SYST BP LT 130 MM HG: CPT | Performed by: NURSE PRACTITIONER

## 2025-07-17 PROCEDURE — 3078F DIAST BP <80 MM HG: CPT | Performed by: NURSE PRACTITIONER

## 2025-07-17 PROCEDURE — 93005 ELECTROCARDIOGRAM TRACING: CPT | Performed by: NURSE PRACTITIONER

## 2025-07-17 PROCEDURE — 1159F MED LIST DOCD IN RCRD: CPT | Performed by: NURSE PRACTITIONER

## 2025-07-17 PROCEDURE — 99214 OFFICE O/P EST MOD 30 MIN: CPT | Performed by: NURSE PRACTITIONER

## 2025-07-17 PROCEDURE — 99212 OFFICE O/P EST SF 10 MIN: CPT

## 2025-07-17 NOTE — PROGRESS NOTES
Chief Complaint:   Follow up, SOB      History Of Present Illness:    Chantelle Rao is a 70 y.o. female with h/o COPD, lung nodules s/p EBUS 1/2025 (negative for malignancy or infectious process), thyroid abscess, MARV, R+LHC 7/6/2023 negative for obstructive CAD, normal intracardiac pressures (RA 4, RV 21/0, PA 22/0, PW 8, CO 5.6) presenting today for evaluation of SOB as well as for review of recent holter monitor that was obtained by PCP.  Chantelle continues to feel SOB with minimal exertion and at times at rest.  She has been SOB with exertion for many years, however, she was hospitalized last fall with RSV/pneumonia and states SOB has felt worse since that time.  She does feel heart racing palpitations at times-- mostly associated with dyspnea on exertion. Reports intermittent wheezing, mild LE edema that is worse throughout the day and resolves with leg elevation.  Also reports having intermittent chest tightness associated with SOB.  Has not followed up with pulmonary since her lung biopsy in January.  Has xopenex inhaler, but rarely uses it.      Last Recorded Vitals:  Vitals:    07/17/25 0922   BP: 121/66   Pulse: 85   SpO2: 95%       Past Medical History:  She has a past medical history of Acute frontal sinusitis, unspecified (10/30/2014), Anemia, COPD (chronic obstructive pulmonary disease) (Multi), Disease of thyroid gland, GERD (gastroesophageal reflux disease), Other chronic diseases of tonsils and adenoids (06/03/2021), and RLS (restless legs syndrome).    Past Surgical History:  She has a past surgical history that includes Hysterectomy (05/09/2014); Lumbar laminectomy (05/09/2014); and CT guided percutaneous biopsy lung (07/08/2020).      Social History:  She reports that she quit smoking about 8 months ago. Her smoking use included cigarettes. She started smoking about 2 years ago. She has a 0.5 pack-year smoking history. She has never used smokeless tobacco. She reports that she does not currently use  "alcohol. She reports that she does not use drugs.    Family History:  Family History[1]     Allergies:  Other    Outpatient Medications:  Current Outpatient Medications   Medication Instructions    ALPRAZolam (XANAX) 0.5 mg, oral, 3 times daily PRN    ALPRAZolam (XANAX) 0.5 mg, oral, 3 times daily PRN    esomeprazole (NEXIUM) 40 mg, oral, Daily before breakfast, Do not open capsule.    levalbuterol (Xopenex HFA) 45 mcg/actuation inhaler 1-2 puffs, inhalation, Every 6 hours PRN    levothyroxine (SYNTHROID, LEVOXYL) 25 mcg, oral, Daily before breakfast    loratadine (CLARITIN) 10 mg, oral, Daily    oxygen (O2) gas therapy 1 each, inhalation, Every 24 hours    rOPINIRole (REQUIP) 4 mg, oral, 2 times daily    SUMAtriptan (IMITREX) 50 mg, oral, Once as needed, May repeat after 2 hours.    syringe with needle (Syringe 3cc/25Gx1\") 3 mL 25 gauge x 1\" syringe 1 each, miscellaneous, Every 30 days    torsemide (DEMADEX) 40 mg, oral, 2 times daily    zolpidem (AMBIEN) 10 mg, oral, Nightly PRN       Physical Exam:  Constitutional: Pleasant, Awake/Alert/Oriented to person place and time.   Head: Atraumatic, Normocephalic  Eyes: EOMI. MELONIE  Neck:  No JVD  Cardiovascular: Regular rate and rhythm, S1, S2. No extra heart sounds or murmurs  Respiratory: Diminished breath sounds posteriorly   Abdomen: Soft, Nontender. Bowel sounds appreciated  Musculoskeletal: ROM intact. Muscle strength grossly intact upper and lower extremities 5/5.   Neurological: CNII-XII intact. Sensation grossly intact  Extremities: Warm and dry. No acute rashes and lesions  Psychiatric: Appropriate mood and affect       Last Labs:  CBC -  Lab Results   Component Value Date    WBC 8.8 02/25/2025    HGB 14.0 02/25/2025    HCT 42.2 02/25/2025    MCV 88.5 02/25/2025     02/25/2025       CMP -  Lab Results   Component Value Date    CALCIUM 9.5 02/25/2025    PROT 7.8 02/25/2025    ALBUMIN 5.0 02/25/2025    AST 15 02/25/2025    ALT 10 02/25/2025    ALKPHOS 92 " 02/25/2025    BILITOT 0.4 02/25/2025       LIPID PANEL -   Lab Results   Component Value Date    CHOL 256 (H) 02/25/2025    TRIG 156 (H) 02/25/2025    HDL 57 02/25/2025    CHHDL 4.5 02/25/2025    LDLF 129 (H) 09/19/2023    VLDL 47 (H) 09/19/2023    NHDL 199 (H) 02/25/2025       RENAL FUNCTION PANEL -   Lab Results   Component Value Date    GLUCOSE 96 02/25/2025     02/25/2025    K 3.5 02/25/2025    CL 99 02/25/2025    CO2 28 02/25/2025    ANIONGAP 14 02/25/2025    BUN 20 02/25/2025    CREATININE 0.97 02/25/2025    CALCIUM 9.5 02/25/2025    ALBUMIN 5.0 02/25/2025        Lab Results   Component Value Date     (H) 11/26/2024    HGBA1C 5.6 02/25/2025    HGBA1C 5.7 12/20/2024       Last Cardiology Tests:  ECG:  ECG today: NSR, HR 83bpm     Echo:  Transthoracic Echo (TTE) Complete 05/05/2025  CONCLUSIONS:   1. Left ventricular ejection fraction is normal, by visual estimate at 60-65%.   2. Spectral Doppler shows a Grade I (impaired relaxation pattern) of left ventricular diastolic filling with normal left atrial filling pressure.   3. There is normal right ventricular global systolic function.    LOLITA 6/25/2023:  CONCLUSIONS:   1. Left ventricular ejection fraction is normal, by visual estimate at 60-65%.   2. Spectral Doppler shows a Grade I (impaired relaxation pattern) of left ventricular diastolic filling with normal left atrial filling pressure.   3. There is normal right ventricular global systolic function.    5/15/2023 Echo:  CONCLUSIONS:  1. Left ventricular systolic function is normal with a 55-60% estimated ejection fraction.  2. Spectral Doppler shows an impaired relaxation pattern of left ventricular diastolic filling.  3. Moderate to severe aortic valve regurgitation.  4. There is mild mitral and tricuspid regurgitation.  5. The estimated pulmonary artery pressure is mildly elevated with the RVSP at 42.5 mmHg.    Cardiac Imaging:  CT cardiac scoring wo IV contrast 06/03/2023  CONCLUSIONS:  1. No  significant angiographic coronary disease (trivial luminal irregularities) in right dominant coronary circulation.  2. RA 4mmHg, RV 21/0, PA 22/0 (14), PWCP 8, LVEDP 9mmHg.  3. Philippe CO 5.6 L/min, PVR 1.1 SALDIVAR, SVR 12.4 SALDIVAR.  4. Trivial aortic insufficiency by aortography.    Zio 5/5/2025-5/12/2025:  Min HR 67, Max HR 171bpm, Avg HR 90bpm  NSR with 1 event of pSVT lasting 10 beats at 171bpm  <1%PAC/PVC    Lab review: I have personally reviewed the laboratory result(s)   Diagnostic review: I have personally reviewed the result(s) of the Echocardiogram, R+LHC, and ECG     Assessment/Plan   Very pleasant 70 y.o. female with h/o COPD, lung nodules s/p EBUS 1/2025 (negative for malignancy or infectious process), thyroid abscess, MARV, R+LHC 7/6/2023 negative for obstructive CAD, normal intracardiac pressures (RA 4, RV 21/0, PA 22/0, PW 8, CO 5.6) presenting today for evaluation of SOB-- ongoing for many years with progression since last fall after RSV/pneumonia.  Zio monitor in May shows NSR with 1 event of pSVT lasting 10 beats.      Plan:  I suspect dyspnea on exertion is being driven by lung pathology given her h/o COPD as well as multiple lung nodules and not due to a cardiac etiology.  We reviewed together her cardiac workup from 2023-- normal coronaries, normal intracardiac pressures as well as recent echocardiogram from May that shows preserved EF, trace AI, RVSP 31mmHg.  She remains euvolemic on exam today.  I have low suspicion that her coronary anatomy has changed over the past 2 years, however, I have offered to obtain CTA coronaries with heart flow for further ischemic evaluation (did not tolerate pharm nuc in the past, and states she would not be able to ambulate on the treadmill), however, she has declined at this time.  She would like to focus on further treatment of COPD, and would like another opinion from a pulmonary standpoint.  Referral to pulmonary has been placed.  We have discussed that her cardiac  ambulatory monitor shows NSR with an average HR of 90bpm.  She has been taking coreg with no improvement in symptoms-- ok to stop at this time.      Follow up with pulmonary and with PCP.  Can follow up with cardiology as needed in the future.       Juliette Moore, APRN-CNP       [1]   Family History  Problem Relation Name Age of Onset    Breast cancer Mother      Breast cancer Sister      Cancer Other Family History     Breast cancer Mother's Sister

## 2025-07-18 DIAGNOSIS — J44.9 CHRONIC OBSTRUCTIVE PULMONARY DISEASE, UNSPECIFIED COPD TYPE (MULTI): Primary | ICD-10-CM

## 2025-07-21 ENCOUNTER — APPOINTMENT (OUTPATIENT)
Dept: PRIMARY CARE | Facility: CLINIC | Age: 70
End: 2025-07-21
Payer: MEDICARE

## 2025-07-21 RX ORDER — BUDESONIDE, GLYCOPYRROLATE, AND FORMOTEROL FUMARATE 160; 9; 4.8 UG/1; UG/1; UG/1
2 AEROSOL, METERED RESPIRATORY (INHALATION)
Qty: 24 G | Refills: 0 | Status: SHIPPED | OUTPATIENT
Start: 2025-07-21 | End: 2025-08-20

## 2025-07-24 ENCOUNTER — APPOINTMENT (OUTPATIENT)
Dept: PRIMARY CARE | Facility: CLINIC | Age: 70
End: 2025-07-24
Payer: MEDICARE

## 2025-07-24 VITALS — SYSTOLIC BLOOD PRESSURE: 118 MMHG | DIASTOLIC BLOOD PRESSURE: 76 MMHG | HEART RATE: 86 BPM | TEMPERATURE: 97.9 F

## 2025-07-24 DIAGNOSIS — J44.9 CHRONIC OBSTRUCTIVE PULMONARY DISEASE, UNSPECIFIED COPD TYPE (MULTI): Primary | ICD-10-CM

## 2025-07-24 DIAGNOSIS — N18.1 STAGE 1 CHRONIC KIDNEY DISEASE: ICD-10-CM

## 2025-07-24 DIAGNOSIS — R13.11 ORAL PHASE DYSPHAGIA: ICD-10-CM

## 2025-07-24 PROCEDURE — 3074F SYST BP LT 130 MM HG: CPT

## 2025-07-24 PROCEDURE — 3078F DIAST BP <80 MM HG: CPT

## 2025-07-24 PROCEDURE — 99214 OFFICE O/P EST MOD 30 MIN: CPT

## 2025-07-24 PROCEDURE — 1159F MED LIST DOCD IN RCRD: CPT

## 2025-07-24 RX ORDER — FLUTICASONE FUROATE, UMECLIDINIUM BROMIDE AND VILANTEROL TRIFENATATE 200; 62.5; 25 UG/1; UG/1; UG/1
1 POWDER RESPIRATORY (INHALATION)
Qty: 180 EACH | Refills: 0 | Status: SHIPPED | OUTPATIENT
Start: 2025-07-24 | End: 2025-10-22

## 2025-07-24 NOTE — PROGRESS NOTES
Subjective   Patient ID: Chantelle Rao is a 70 y.o. female who presents for Pain (Back right side pain this been going on for few years now but it been getting worse lately).    Current symptoms include chronic dyspnea, inability to climb stairs, and non-productive cough. Symptoms have been present since several weeks ago and have been unchanged. She denies weight loss, chills, fever, and increased sputum. Associated symptoms include chest pain.  This episode appears to have been triggered by no identifiable factor. Treatments tried for the current exacerbation: albuterol inhaler. The patient has been having similar episodes for approximately several years      Dysphagia, history of esophageal dilation with stretching, recommend general surgery referral for further evaluation and possible EGD    Vitals:    07/24/25 1306   BP: 118/76   Pulse: 86   Temp: 36.6 °C (97.9 °F)       Review of Systems    Objective   Physical Exam  Vitals and nursing note reviewed.   Pulmonary:      Effort: Prolonged expiration present.      Breath sounds: Decreased air movement present. Decreased breath sounds and rhonchi present.         Assessment/Plan   Problem List Items Addressed This Visit    None  Visit Diagnoses         Chronic obstructive pulmonary disease, unspecified COPD type (Multi)    -  Primary    Relevant Medications    fluticasone-umeclidin-vilanter (Trelegy Ellipta) 200-62.5-25 mcg blister with device      Stage 1 chronic kidney disease        Relevant Orders    Comprehensive Metabolic Panel      Oral phase dysphagia        Relevant Orders    Referral to General Surgery                 Thank you for coming in today, please call my office if you have any concerns or questions.     Antonio STACK, CNP

## 2025-07-24 NOTE — PATIENT INSTRUCTIONS
Start trelegy for COPD  Check labs today    Dr. Bourne referral for the dysphagia    Thank you for coming in today, if any questions or concerns arise, please call my office.   SEDA Crane-CNP

## 2025-07-26 LAB
ALBUMIN SERPL-MCNC: 4.5 G/DL (ref 3.6–5.1)
ALP SERPL-CCNC: 99 U/L (ref 37–153)
ALT SERPL-CCNC: 10 U/L (ref 6–29)
ANION GAP SERPL CALCULATED.4IONS-SCNC: 12 MMOL/L (CALC) (ref 7–17)
AST SERPL-CCNC: 16 U/L (ref 10–35)
BILIRUB SERPL-MCNC: 0.4 MG/DL (ref 0.2–1.2)
BUN SERPL-MCNC: 14 MG/DL (ref 7–25)
CALCIUM SERPL-MCNC: 9.2 MG/DL (ref 8.6–10.4)
CHLORIDE SERPL-SCNC: 100 MMOL/L (ref 98–110)
CO2 SERPL-SCNC: 28 MMOL/L (ref 20–32)
CREAT SERPL-MCNC: 0.89 MG/DL (ref 0.6–1)
EGFRCR SERPLBLD CKD-EPI 2021: 70 ML/MIN/1.73M2
GLUCOSE SERPL-MCNC: 88 MG/DL (ref 65–99)
POTASSIUM SERPL-SCNC: 4.7 MMOL/L (ref 3.5–5.3)
PROT SERPL-MCNC: 7.1 G/DL (ref 6.1–8.1)
SODIUM SERPL-SCNC: 140 MMOL/L (ref 135–146)

## 2025-07-31 ENCOUNTER — APPOINTMENT (OUTPATIENT)
Dept: PRIMARY CARE | Facility: CLINIC | Age: 70
End: 2025-07-31
Payer: MEDICARE

## 2025-08-13 ENCOUNTER — APPOINTMENT (OUTPATIENT)
Dept: SURGERY | Facility: CLINIC | Age: 70
End: 2025-08-13
Payer: MEDICARE

## 2025-08-14 DIAGNOSIS — G47.09 OTHER INSOMNIA: ICD-10-CM

## 2025-08-14 DIAGNOSIS — F41.9 ANXIETY: ICD-10-CM

## 2025-08-14 RX ORDER — ZOLPIDEM TARTRATE 10 MG/1
10 TABLET ORAL NIGHTLY PRN
Qty: 30 TABLET | Refills: 0 | Status: SHIPPED | OUTPATIENT
Start: 2025-08-14 | End: 2025-09-13

## 2025-08-14 RX ORDER — ALPRAZOLAM 0.5 MG/1
0.5 TABLET ORAL 3 TIMES DAILY PRN
Qty: 90 TABLET | Refills: 0 | Status: SHIPPED | OUTPATIENT
Start: 2025-08-14 | End: 2025-09-13

## 2025-08-29 ENCOUNTER — APPOINTMENT (OUTPATIENT)
Dept: SURGERY | Facility: CLINIC | Age: 70
End: 2025-08-29
Payer: MEDICARE

## 2025-08-29 VITALS
HEIGHT: 64 IN | BODY MASS INDEX: 25.75 KG/M2 | DIASTOLIC BLOOD PRESSURE: 59 MMHG | SYSTOLIC BLOOD PRESSURE: 116 MMHG | HEART RATE: 95 BPM

## 2025-08-29 DIAGNOSIS — R13.13 CRICOPHARYNGEAL DYSPHAGIA: Primary | ICD-10-CM

## 2025-08-29 DIAGNOSIS — R13.12 OROPHARYNGEAL DYSPHAGIA: ICD-10-CM

## 2025-08-29 DIAGNOSIS — K21.9 GASTROESOPHAGEAL REFLUX DISEASE WITHOUT ESOPHAGITIS: ICD-10-CM

## 2025-08-29 PROCEDURE — 3078F DIAST BP <80 MM HG: CPT | Performed by: SURGERY

## 2025-08-29 PROCEDURE — 1159F MED LIST DOCD IN RCRD: CPT | Performed by: SURGERY

## 2025-08-29 PROCEDURE — 3074F SYST BP LT 130 MM HG: CPT | Performed by: SURGERY

## 2025-08-29 PROCEDURE — 99204 OFFICE O/P NEW MOD 45 MIN: CPT | Performed by: SURGERY

## 2025-08-29 PROCEDURE — 1036F TOBACCO NON-USER: CPT | Performed by: SURGERY

## 2025-08-29 RX ORDER — DEXLANSOPRAZOLE 60 MG/1
60 CAPSULE, DELAYED RELEASE ORAL DAILY
Qty: 30 CAPSULE | Refills: 2 | Status: SHIPPED | OUTPATIENT
Start: 2025-08-29 | End: 2025-11-27

## 2025-08-29 ASSESSMENT — ENCOUNTER SYMPTOMS: SHORTNESS OF BREATH: 1

## 2025-10-01 ENCOUNTER — APPOINTMENT (OUTPATIENT)
Dept: SURGERY | Facility: CLINIC | Age: 70
End: 2025-10-01
Payer: MEDICARE